# Patient Record
Sex: MALE | Race: WHITE | NOT HISPANIC OR LATINO | Employment: OTHER | URBAN - NONMETROPOLITAN AREA
[De-identification: names, ages, dates, MRNs, and addresses within clinical notes are randomized per-mention and may not be internally consistent; named-entity substitution may affect disease eponyms.]

---

## 2017-05-01 RX ORDER — NEBIVOLOL HYDROCHLORIDE 10 MG/1
TABLET ORAL
Qty: 90 TABLET | Refills: 3 | Status: SHIPPED | OUTPATIENT
Start: 2017-05-01

## 2017-06-21 ENCOUNTER — TELEPHONE (OUTPATIENT)
Dept: INTERNAL MEDICINE | Age: 67
End: 2017-06-21

## 2017-10-20 ENCOUNTER — HOSPITAL ENCOUNTER (EMERGENCY)
Age: 67
Discharge: HOME | End: 2017-10-21
Attending: EMERGENCY MEDICINE
Payer: MEDICARE

## 2017-10-20 DIAGNOSIS — I10 ESSENTIAL HYPERTENSION: ICD-10-CM

## 2017-10-20 DIAGNOSIS — K04.7 DENTAL ABSCESS: Primary | ICD-10-CM

## 2017-10-20 LAB
ALBUMIN SERPL-MCNC: 4.1 G/DL (ref 3.5–5.2)
ALP BLD-CCNC: 162 U/L (ref 40–130)
ALT SERPL-CCNC: 10 U/L (ref 5–41)
ANION GAP SERPL CALCULATED.3IONS-SCNC: 13 MMOL/L (ref 7–19)
AST SERPL-CCNC: 17 U/L (ref 5–40)
BASOPHILS ABSOLUTE: 0.1 K/UL (ref 0–0.2)
BASOPHILS RELATIVE PERCENT: 0.6 % (ref 0–1)
BILIRUB SERPL-MCNC: 0.4 MG/DL (ref 0.2–1.2)
BUN BLDV-MCNC: 19 MG/DL (ref 8–23)
CALCIUM SERPL-MCNC: 9.5 MG/DL (ref 8.8–10.2)
CHLORIDE BLD-SCNC: 99 MMOL/L (ref 98–111)
CO2: 25 MMOL/L (ref 22–29)
CREAT SERPL-MCNC: 0.8 MG/DL (ref 0.5–1.2)
EOSINOPHILS ABSOLUTE: 0.2 K/UL (ref 0–0.6)
EOSINOPHILS RELATIVE PERCENT: 1.9 % (ref 0–5)
GFR NON-AFRICAN AMERICAN: >60
GLUCOSE BLD-MCNC: 96 MG/DL (ref 74–109)
HCT VFR BLD CALC: 46.8 % (ref 42–52)
HEMOGLOBIN: 15.8 G/DL (ref 14–18)
LYMPHOCYTES ABSOLUTE: 1.8 K/UL (ref 1.1–4.5)
LYMPHOCYTES RELATIVE PERCENT: 20.8 % (ref 20–40)
MCH RBC QN AUTO: 27.3 PG (ref 27–31)
MCHC RBC AUTO-ENTMCNC: 33.8 G/DL (ref 33–37)
MCV RBC AUTO: 81 FL (ref 80–94)
MONOCYTES ABSOLUTE: 0.8 K/UL (ref 0–0.9)
MONOCYTES RELATIVE PERCENT: 9.8 % (ref 0–10)
NEUTROPHILS ABSOLUTE: 5.6 K/UL (ref 1.5–7.5)
NEUTROPHILS RELATIVE PERCENT: 66.5 % (ref 50–65)
PDW BLD-RTO: 13.3 % (ref 11.5–14.5)
PLATELET # BLD: 245 K/UL (ref 130–400)
PMV BLD AUTO: 9.5 FL (ref 9.4–12.4)
POTASSIUM SERPL-SCNC: 4.5 MMOL/L (ref 3.5–5)
RBC # BLD: 5.78 M/UL (ref 4.7–6.1)
SODIUM BLD-SCNC: 137 MMOL/L (ref 136–145)
TOTAL PROTEIN: 7.3 G/DL (ref 6.6–8.7)
WBC # BLD: 8.4 K/UL (ref 4.8–10.8)

## 2017-10-20 PROCEDURE — 96375 TX/PRO/DX INJ NEW DRUG ADDON: CPT

## 2017-10-20 PROCEDURE — 2500000003 HC RX 250 WO HCPCS: Performed by: EMERGENCY MEDICINE

## 2017-10-20 PROCEDURE — 99283 EMERGENCY DEPT VISIT LOW MDM: CPT

## 2017-10-20 RX ORDER — AMLODIPINE BESYLATE 10 MG/1
10 TABLET ORAL DAILY
Status: ON HOLD | COMMUNITY
End: 2019-11-17 | Stop reason: HOSPADM

## 2017-10-20 RX ORDER — LABETALOL HYDROCHLORIDE 5 MG/ML
INJECTION, SOLUTION INTRAVENOUS
Status: DISCONTINUED
Start: 2017-10-20 | End: 2017-10-21 | Stop reason: HOSPADM

## 2017-10-20 RX ORDER — LABETALOL HYDROCHLORIDE 5 MG/ML
10 INJECTION, SOLUTION INTRAVENOUS ONCE
Status: COMPLETED | OUTPATIENT
Start: 2017-10-20 | End: 2017-10-20

## 2017-10-20 RX ORDER — PENICILLIN V POTASSIUM 500 MG/1
500 TABLET ORAL 4 TIMES DAILY
Status: ON HOLD | COMMUNITY
End: 2019-11-17 | Stop reason: HOSPADM

## 2017-10-20 RX ORDER — ATORVASTATIN CALCIUM 80 MG/1
80 TABLET, FILM COATED ORAL DAILY
COMMUNITY

## 2017-10-20 RX ORDER — NEBIVOLOL 10 MG/1
TABLET ORAL DAILY
Status: ON HOLD | COMMUNITY
End: 2019-11-17 | Stop reason: HOSPADM

## 2017-10-20 RX ORDER — LOSARTAN POTASSIUM 50 MG/1
50 TABLET ORAL DAILY
Status: ON HOLD | COMMUNITY
End: 2019-11-17 | Stop reason: HOSPADM

## 2017-10-20 RX ORDER — HYDROCODONE BITARTRATE AND ACETAMINOPHEN 5; 325 MG/1; MG/1
1 TABLET ORAL EVERY 6 HOURS PRN
COMMUNITY

## 2017-10-20 RX ADMIN — LABETALOL HYDROCHLORIDE 10 MG: 5 INJECTION INTRAVENOUS at 23:17

## 2017-10-21 VITALS
RESPIRATION RATE: 20 BRPM | SYSTOLIC BLOOD PRESSURE: 136 MMHG | BODY MASS INDEX: 29.82 KG/M2 | OXYGEN SATURATION: 98 % | HEIGHT: 67 IN | TEMPERATURE: 97.9 F | WEIGHT: 190 LBS | HEART RATE: 72 BPM | DIASTOLIC BLOOD PRESSURE: 81 MMHG

## 2017-10-21 PROCEDURE — 6360000002 HC RX W HCPCS: Performed by: EMERGENCY MEDICINE

## 2017-10-21 PROCEDURE — 99282 EMERGENCY DEPT VISIT SF MDM: CPT | Performed by: EMERGENCY MEDICINE

## 2017-10-21 PROCEDURE — 85025 COMPLETE CBC W/AUTO DIFF WBC: CPT

## 2017-10-21 PROCEDURE — 80053 COMPREHEN METABOLIC PANEL: CPT

## 2017-10-21 PROCEDURE — 2500000003 HC RX 250 WO HCPCS: Performed by: EMERGENCY MEDICINE

## 2017-10-21 PROCEDURE — 96375 TX/PRO/DX INJ NEW DRUG ADDON: CPT

## 2017-10-21 PROCEDURE — 36415 COLL VENOUS BLD VENIPUNCTURE: CPT

## 2017-10-21 PROCEDURE — 6370000000 HC RX 637 (ALT 250 FOR IP): Performed by: EMERGENCY MEDICINE

## 2017-10-21 PROCEDURE — 96365 THER/PROPH/DIAG IV INF INIT: CPT

## 2017-10-21 RX ORDER — ONDANSETRON 2 MG/ML
4 INJECTION INTRAMUSCULAR; INTRAVENOUS EVERY 30 MIN PRN
Status: DISCONTINUED | OUTPATIENT
Start: 2017-10-21 | End: 2017-10-21 | Stop reason: HOSPADM

## 2017-10-21 RX ORDER — CLONIDINE HYDROCHLORIDE 0.1 MG/1
TABLET ORAL
Qty: 20 TABLET | Refills: 1 | Status: ON HOLD | OUTPATIENT
Start: 2017-10-21 | End: 2019-11-17 | Stop reason: HOSPADM

## 2017-10-21 RX ORDER — CLINDAMYCIN PHOSPHATE 600 MG/50ML
600 INJECTION INTRAVENOUS ONCE
Status: COMPLETED | OUTPATIENT
Start: 2017-10-21 | End: 2017-10-21

## 2017-10-21 RX ORDER — CLONIDINE HYDROCHLORIDE 0.1 MG/1
0.1 TABLET ORAL ONCE
Status: COMPLETED | OUTPATIENT
Start: 2017-10-21 | End: 2017-10-21

## 2017-10-21 RX ADMIN — CLONIDINE HYDROCHLORIDE 0.1 MG: 0.1 TABLET ORAL at 00:06

## 2017-10-21 RX ADMIN — CLINDAMYCIN PHOSPHATE 600 MG: 600 INJECTION INTRAVENOUS at 00:06

## 2017-10-21 RX ADMIN — HYDROMORPHONE HYDROCHLORIDE 0.5 MG: 1 INJECTION, SOLUTION INTRAMUSCULAR; INTRAVENOUS; SUBCUTANEOUS at 01:51

## 2017-10-21 RX ADMIN — ONDANSETRON 4 MG: 2 INJECTION INTRAMUSCULAR; INTRAVENOUS at 01:50

## 2017-10-21 ASSESSMENT — ENCOUNTER SYMPTOMS
SINUS PRESSURE: 0
CONSTIPATION: 0
NAUSEA: 0
APNEA: 0
DIARRHEA: 0
CHOKING: 0
SORE THROAT: 0
FACIAL SWELLING: 0
BLOOD IN STOOL: 0
VOICE CHANGE: 0
EYE DISCHARGE: 0

## 2017-10-21 ASSESSMENT — PAIN SCALES - GENERAL: PAINLEVEL_OUTOF10: 2

## 2017-10-21 NOTE — ED NOTES
ASSESSMENT: here with elevated bp and swollen right jaw with dental pain    PT ALERT/ORIENTED X4. PUPILS EQUAL/REACTIVE    SKIN:  WARM/DRY PINK CAPILLARY REFILL < 2SECS    CARDIAC:  S1 S2 NOTED     LUNGS: CLEAR UPPER AND LOWER LOBES, RESPIRATIONS EVEN/UNLABORED     ABDOMEN: BOWEL SOUNDS NOTED UPPER AND LOWER QUADRANTS                     SOFT AND NONTENDER. EXTREMITIES:  BILATERAL DP AND PT AND NO EDEMA NOTED. NO DISTRESS NOTED. SIDE RAILS UP AND CALL LIGHT IN REACH.      Vinny Noe RN  10/20/17 8267

## 2017-10-21 NOTE — ED NOTES
Patient placed in a gown Patient placed on cardiac monitor, continuous pulse oximeter, and NIBP monitor.  Monitor alarms on.       Linda Contreras RN  10/20/17 3209

## 2017-10-21 NOTE — ED PROVIDER NOTES
for level 4, 8 or more for level 5)     ED Triage Vitals [10/20/17 2258]   BP Temp Temp src Pulse Resp SpO2 Height Weight   (!) 208/103 97.9 °F (36.6 °C) -- 68 20 98 % 5' 7\" (1.702 m) 190 lb (86.2 kg)       Physical Exam   Constitutional: He is oriented to person, place, and time. He appears well-developed and well-nourished. No distress. HENT:   Head: Normocephalic and atraumatic. Right Ear: External ear normal.   Left Ear: External ear normal.   Nose: Nose normal.   Mouth/Throat: Oropharynx is clear and moist.   There is obvious facial swelling at the anterior aspect of the right mandible. There is is firmness of the tissue no palpable abscess that'll see any pointing. Deftly has some tooth decay at this site but I don't see any fistula tracks or discharge becomes mildly irritated looking. Eyes: Conjunctivae and EOM are normal. Pupils are equal, round, and reactive to light. Neck: Normal range of motion. Neck supple. Cardiovascular: Normal rate, regular rhythm, normal heart sounds and intact distal pulses. No murmur heard. Pulmonary/Chest: Effort normal and breath sounds normal.   Abdominal: Soft. Musculoskeletal: Normal range of motion. Lymphadenopathy:     He has no cervical adenopathy. Neurological: He is alert and oriented to person, place, and time. No cranial nerve deficit. Skin: Skin is warm and dry. Psychiatric: He has a normal mood and affect. His behavior is normal. Thought content normal.   Nursing note and vitals reviewed.       DIAGNOSTIC RESULTS     EKG: All EKG's are interpreted by the Emergency Department Physician who either signs or Co-signs this chart in the absence of a cardiologist.        RADIOLOGY:   Non-plain film images such as CT, Ultrasound and MRI are read by the radiologist. Plain radiographic images are visualized and preliminarily interpreted by the emergency physician with the below findings:        Interpretation per the Radiologist below, if available at the time of this note:    No orders to display         ED BEDSIDE ULTRASOUND:   Performed by ED Physician - none    LABS:  Labs Reviewed   CBC WITH AUTO DIFFERENTIAL - Abnormal; Notable for the following:        Result Value    Neutrophils % 66.5 (*)     All other components within normal limits   COMPREHENSIVE METABOLIC PANEL - Abnormal; Notable for the following:     Alkaline Phosphatase 162 (*)     All other components within normal limits       All other labs were within normal range or not returned as of this dictation. EMERGENCY DEPARTMENT COURSE and DIFFERENTIAL DIAGNOSIS/MDM:   Vitals:    Vitals:    10/21/17 0027 10/21/17 0033 10/21/17 0106 10/21/17 0142   BP: (!) 169/98 (!) 165/88 (!) 144/86 136/81   Pulse: 70 72 72 72   Resp: 20  20 20   Temp:       SpO2: 97%  98% 98%   Weight:       Height:           MDM  Number of Diagnoses or Management Options  Dental abscess:   Essential hypertension:   Diagnosis management comments: Patient is going to promised takes no medications routinely. Keep up with his blood pressure. Given him some Catapres for when necessary hypertensive treatment when necessary until he decides whether he needs medication increased or an addition of a new medicine. It may be up to speak because he's got this infection and pain. I take to make an adjustment that he doesn't need long term but he sounds like he's got good family support. CONSULTS:  None    PROCEDURES:  Unless otherwise noted below, none     Procedures    FINAL IMPRESSION      1. Dental abscess    2. Essential hypertension          DISPOSITION/PLAN   DISPOSITION Decision to Discharge    PATIENT REFERRED TO:  [address]   [facility] [address] [phone]  Schedule an appointment as soon as possible for a visit         DISCHARGE MEDICATIONS:  New Prescriptions    CLONIDINE (CATAPRES) 0.1 MG TABLET    Take 1 tablet by mouth every 8 hours when necessary for systolic blood pressure greater

## 2019-02-20 ENCOUNTER — TELEPHONE (OUTPATIENT)
Dept: OTHER | Age: 69
End: 2019-02-20

## 2019-11-14 ENCOUNTER — HOSPITAL ENCOUNTER (INPATIENT)
Age: 69
LOS: 3 days | Discharge: HOME | DRG: 305 | End: 2019-11-17
Attending: HOSPITALIST | Admitting: HOSPITALIST
Payer: MEDICARE

## 2019-11-14 PROBLEM — I16.0 HYPERTENSIVE URGENCY: Status: ACTIVE | Noted: 2019-11-14

## 2019-11-14 PROCEDURE — 2100000000 HC CCU R&B

## 2019-11-14 PROCEDURE — 2500000003 HC RX 250 WO HCPCS: Performed by: HOSPITALIST

## 2019-11-14 PROCEDURE — 2580000003 HC RX 258: Performed by: HOSPITALIST

## 2019-11-14 RX ORDER — SODIUM CHLORIDE 0.9 % (FLUSH) 0.9 %
10 SYRINGE (ML) INJECTION PRN
Status: DISCONTINUED | OUTPATIENT
Start: 2019-11-14 | End: 2019-11-17 | Stop reason: HOSPADM

## 2019-11-14 RX ORDER — ENALAPRILAT 2.5 MG/2ML
1.25 INJECTION INTRAVENOUS EVERY 6 HOURS
Status: CANCELLED | OUTPATIENT
Start: 2019-11-14

## 2019-11-14 RX ORDER — HEPARIN SODIUM 5000 [USP'U]/ML
5000 INJECTION, SOLUTION INTRAVENOUS; SUBCUTANEOUS EVERY 8 HOURS SCHEDULED
Status: DISCONTINUED | OUTPATIENT
Start: 2019-11-14 | End: 2019-11-17 | Stop reason: HOSPADM

## 2019-11-14 RX ORDER — ONDANSETRON 2 MG/ML
4 INJECTION INTRAMUSCULAR; INTRAVENOUS EVERY 6 HOURS PRN
Status: DISCONTINUED | OUTPATIENT
Start: 2019-11-14 | End: 2019-11-17 | Stop reason: HOSPADM

## 2019-11-14 RX ORDER — SODIUM CHLORIDE 0.9 % (FLUSH) 0.9 %
10 SYRINGE (ML) INJECTION EVERY 12 HOURS SCHEDULED
Status: DISCONTINUED | OUTPATIENT
Start: 2019-11-14 | End: 2019-11-17 | Stop reason: HOSPADM

## 2019-11-14 RX ADMIN — DEXTROSE MONOHYDRATE 2.5 MG/HR: 5 INJECTION, SOLUTION INTRAVENOUS at 23:35

## 2019-11-14 ASSESSMENT — PAIN SCALES - GENERAL: PAINLEVEL_OUTOF10: 0

## 2019-11-15 LAB
ANION GAP SERPL CALCULATED.3IONS-SCNC: 13 MMOL/L (ref 7–19)
APTT: 28.1 SEC (ref 26–36.2)
BASOPHILS ABSOLUTE: 0 K/UL (ref 0–0.2)
BASOPHILS RELATIVE PERCENT: 0.3 % (ref 0–1)
BUN BLDV-MCNC: 20 MG/DL (ref 8–23)
CALCIUM SERPL-MCNC: 8.9 MG/DL (ref 8.8–10.2)
CHLORIDE BLD-SCNC: 102 MMOL/L (ref 98–111)
CHOLESTEROL, TOTAL: 168 MG/DL (ref 160–199)
CO2: 25 MMOL/L (ref 22–29)
CREAT SERPL-MCNC: 0.9 MG/DL (ref 0.5–1.2)
EOSINOPHILS ABSOLUTE: 0 K/UL (ref 0–0.6)
EOSINOPHILS RELATIVE PERCENT: 0 % (ref 0–5)
GFR NON-AFRICAN AMERICAN: >60
GLUCOSE BLD-MCNC: 157 MG/DL (ref 74–109)
HBA1C MFR BLD: 5.9 % (ref 4–6)
HCT VFR BLD CALC: 49.7 % (ref 42–52)
HDLC SERPL-MCNC: 28 MG/DL (ref 55–121)
HEMOGLOBIN: 16.6 G/DL (ref 14–18)
IMMATURE GRANULOCYTES #: 0 K/UL
INR BLD: 1.03 (ref 0.88–1.18)
LDL CHOLESTEROL CALCULATED: 86 MG/DL
LV EF: 58 %
LVEF MODALITY: NORMAL
LYMPHOCYTES ABSOLUTE: 0.9 K/UL (ref 1.1–4.5)
LYMPHOCYTES RELATIVE PERCENT: 14 % (ref 20–40)
MAGNESIUM: 2 MG/DL (ref 1.6–2.4)
MCH RBC QN AUTO: 27.1 PG (ref 27–31)
MCHC RBC AUTO-ENTMCNC: 33.4 G/DL (ref 33–37)
MCV RBC AUTO: 81.2 FL (ref 80–94)
MONOCYTES ABSOLUTE: 0.2 K/UL (ref 0–0.9)
MONOCYTES RELATIVE PERCENT: 3.4 % (ref 0–10)
NEUTROPHILS ABSOLUTE: 5.5 K/UL (ref 1.5–7.5)
NEUTROPHILS RELATIVE PERCENT: 81.9 % (ref 50–65)
PDW BLD-RTO: 13.3 % (ref 11.5–14.5)
PHOSPHORUS: 2.1 MG/DL (ref 2.5–4.5)
PHOSPHORUS: 2.7 MG/DL (ref 2.5–4.5)
PLATELET # BLD: 232 K/UL (ref 130–400)
PMV BLD AUTO: 10.1 FL (ref 9.4–12.4)
POTASSIUM REFLEX MAGNESIUM: 4.1 MMOL/L (ref 3.5–5)
PROTHROMBIN TIME: 12.9 SEC (ref 12–14.6)
RBC # BLD: 6.12 M/UL (ref 4.7–6.1)
SODIUM BLD-SCNC: 140 MMOL/L (ref 136–145)
TRIGL SERPL-MCNC: 268 MG/DL (ref 0–149)
TSH REFLEX FT4: 0.42 UIU/ML (ref 0.35–5.5)
WBC # BLD: 6.7 K/UL (ref 4.8–10.8)

## 2019-11-15 PROCEDURE — 2500000003 HC RX 250 WO HCPCS: Performed by: HOSPITALIST

## 2019-11-15 PROCEDURE — 6370000000 HC RX 637 (ALT 250 FOR IP): Performed by: INTERNAL MEDICINE

## 2019-11-15 PROCEDURE — 36415 COLL VENOUS BLD VENIPUNCTURE: CPT

## 2019-11-15 PROCEDURE — 83036 HEMOGLOBIN GLYCOSYLATED A1C: CPT

## 2019-11-15 PROCEDURE — 85730 THROMBOPLASTIN TIME PARTIAL: CPT

## 2019-11-15 PROCEDURE — 85610 PROTHROMBIN TIME: CPT

## 2019-11-15 PROCEDURE — 93306 TTE W/DOPPLER COMPLETE: CPT

## 2019-11-15 PROCEDURE — 92610 EVALUATE SWALLOWING FUNCTION: CPT

## 2019-11-15 PROCEDURE — 6360000002 HC RX W HCPCS: Performed by: HOSPITALIST

## 2019-11-15 PROCEDURE — 2100000000 HC CCU R&B

## 2019-11-15 PROCEDURE — 84443 ASSAY THYROID STIM HORMONE: CPT

## 2019-11-15 PROCEDURE — 83735 ASSAY OF MAGNESIUM: CPT

## 2019-11-15 PROCEDURE — 80048 BASIC METABOLIC PNL TOTAL CA: CPT

## 2019-11-15 PROCEDURE — 80061 LIPID PANEL: CPT

## 2019-11-15 PROCEDURE — 85025 COMPLETE CBC W/AUTO DIFF WBC: CPT

## 2019-11-15 PROCEDURE — 84100 ASSAY OF PHOSPHORUS: CPT

## 2019-11-15 PROCEDURE — 2580000003 HC RX 258: Performed by: HOSPITALIST

## 2019-11-15 RX ORDER — LOSARTAN POTASSIUM 50 MG/1
50 TABLET ORAL 2 TIMES DAILY
Status: DISCONTINUED | OUTPATIENT
Start: 2019-11-15 | End: 2019-11-17 | Stop reason: HOSPADM

## 2019-11-15 RX ORDER — NIFEDIPINE 30 MG/1
30 TABLET, EXTENDED RELEASE ORAL 2 TIMES DAILY
Status: DISCONTINUED | OUTPATIENT
Start: 2019-11-15 | End: 2019-11-17 | Stop reason: HOSPADM

## 2019-11-15 RX ORDER — GUAIFENESIN 600 MG/1
600 TABLET, EXTENDED RELEASE ORAL 2 TIMES DAILY
Status: DISCONTINUED | OUTPATIENT
Start: 2019-11-15 | End: 2019-11-15

## 2019-11-15 RX ORDER — GUAIFENESIN 600 MG/1
600 TABLET, EXTENDED RELEASE ORAL 2 TIMES DAILY
Status: DISCONTINUED | OUTPATIENT
Start: 2019-11-15 | End: 2019-11-17 | Stop reason: HOSPADM

## 2019-11-15 RX ADMIN — HEPARIN SODIUM 5000 UNITS: 5000 INJECTION INTRAVENOUS; SUBCUTANEOUS at 20:30

## 2019-11-15 RX ADMIN — DEXTROSE MONOHYDRATE 2.5 MG/HR: 5 INJECTION, SOLUTION INTRAVENOUS at 18:00

## 2019-11-15 RX ADMIN — LOSARTAN POTASSIUM 50 MG: 50 TABLET, FILM COATED ORAL at 20:30

## 2019-11-15 RX ADMIN — Medication 10 ML: at 01:03

## 2019-11-15 RX ADMIN — HEPARIN SODIUM 5000 UNITS: 5000 INJECTION INTRAVENOUS; SUBCUTANEOUS at 01:03

## 2019-11-15 RX ADMIN — HEPARIN SODIUM 5000 UNITS: 5000 INJECTION INTRAVENOUS; SUBCUTANEOUS at 14:53

## 2019-11-15 RX ADMIN — GUAIFENESIN 600 MG: 600 TABLET, EXTENDED RELEASE ORAL at 16:33

## 2019-11-15 RX ADMIN — POTASSIUM PHOSPHATE, MONOBASIC AND POTASSIUM PHOSPHATE, DIBASIC 10 MMOL: 224; 236 INJECTION, SOLUTION INTRAVENOUS at 06:29

## 2019-11-15 RX ADMIN — NIFEDIPINE 30 MG: 30 TABLET, FILM COATED, EXTENDED RELEASE ORAL at 11:22

## 2019-11-15 RX ADMIN — DEXTROSE MONOHYDRATE 2.5 MG/HR: 5 INJECTION, SOLUTION INTRAVENOUS at 06:35

## 2019-11-15 RX ADMIN — HEPARIN SODIUM 5000 UNITS: 5000 INJECTION INTRAVENOUS; SUBCUTANEOUS at 06:29

## 2019-11-15 RX ADMIN — NIFEDIPINE 30 MG: 30 TABLET, FILM COATED, EXTENDED RELEASE ORAL at 20:30

## 2019-11-15 RX ADMIN — LOSARTAN POTASSIUM 50 MG: 50 TABLET, FILM COATED ORAL at 11:22

## 2019-11-15 ASSESSMENT — PAIN SCALES - GENERAL
PAINLEVEL_OUTOF10: 0

## 2019-11-15 ASSESSMENT — ENCOUNTER SYMPTOMS
VOMITING: 0
NAUSEA: 0
SHORTNESS OF BREATH: 0
DIARRHEA: 0
CONSTIPATION: 0
BACK PAIN: 0

## 2019-11-16 ENCOUNTER — APPOINTMENT (OUTPATIENT)
Dept: GENERAL RADIOLOGY | Age: 69
DRG: 305 | End: 2019-11-16
Attending: HOSPITALIST
Payer: MEDICARE

## 2019-11-16 LAB
BACTERIA: NEGATIVE /HPF
BILIRUBIN URINE: NEGATIVE
BLOOD, URINE: NEGATIVE
CLARITY: CLEAR
COLOR: YELLOW
CREATININE URINE: 258.5 MG/DL (ref 4.2–622)
EPITHELIAL CELLS, UA: 1 /HPF (ref 0–5)
GLUCOSE URINE: NEGATIVE MG/DL
HYALINE CASTS: 2 /HPF (ref 0–8)
KETONES, URINE: NEGATIVE MG/DL
LEUKOCYTE ESTERASE, URINE: ABNORMAL
NITRITE, URINE: NEGATIVE
PH UA: 5.5 (ref 5–8)
PROTEIN PROTEIN: 19 MG/DL (ref 15–45)
PROTEIN UA: NEGATIVE MG/DL
RAPID INFLUENZA  B AGN: NEGATIVE
RAPID INFLUENZA A AGN: NEGATIVE
RBC UA: 1 /HPF (ref 0–4)
SODIUM URINE: 67 MMOL/L
SPECIFIC GRAVITY UA: 1.02 (ref 1–1.03)
UROBILINOGEN, URINE: 0.2 E.U./DL
WBC UA: 5 /HPF (ref 0–5)

## 2019-11-16 PROCEDURE — 6370000000 HC RX 637 (ALT 250 FOR IP): Performed by: HOSPITALIST

## 2019-11-16 PROCEDURE — 2500000003 HC RX 250 WO HCPCS: Performed by: HOSPITALIST

## 2019-11-16 PROCEDURE — 94640 AIRWAY INHALATION TREATMENT: CPT

## 2019-11-16 PROCEDURE — 84156 ASSAY OF PROTEIN URINE: CPT

## 2019-11-16 PROCEDURE — 87070 CULTURE OTHR SPECIMN AEROBIC: CPT

## 2019-11-16 PROCEDURE — 2580000003 HC RX 258: Performed by: HOSPITALIST

## 2019-11-16 PROCEDURE — 94761 N-INVAS EAR/PLS OXIMETRY MLT: CPT

## 2019-11-16 PROCEDURE — 2580000003 HC RX 258: Performed by: INTERNAL MEDICINE

## 2019-11-16 PROCEDURE — 2700000000 HC OXYGEN THERAPY PER DAY

## 2019-11-16 PROCEDURE — 6360000002 HC RX W HCPCS: Performed by: INTERNAL MEDICINE

## 2019-11-16 PROCEDURE — 81001 URINALYSIS AUTO W/SCOPE: CPT

## 2019-11-16 PROCEDURE — 87205 SMEAR GRAM STAIN: CPT

## 2019-11-16 PROCEDURE — 6370000000 HC RX 637 (ALT 250 FOR IP): Performed by: INTERNAL MEDICINE

## 2019-11-16 PROCEDURE — 82570 ASSAY OF URINE CREATININE: CPT

## 2019-11-16 PROCEDURE — 71046 X-RAY EXAM CHEST 2 VIEWS: CPT

## 2019-11-16 PROCEDURE — 2140000000 HC CCU INTERMEDIATE R&B

## 2019-11-16 PROCEDURE — 84300 ASSAY OF URINE SODIUM: CPT

## 2019-11-16 PROCEDURE — 92526 ORAL FUNCTION THERAPY: CPT

## 2019-11-16 PROCEDURE — 6360000002 HC RX W HCPCS: Performed by: HOSPITALIST

## 2019-11-16 PROCEDURE — 87804 INFLUENZA ASSAY W/OPTIC: CPT

## 2019-11-16 RX ORDER — CARVEDILOL 6.25 MG/1
6.25 TABLET ORAL 2 TIMES DAILY WITH MEALS
Status: DISCONTINUED | OUTPATIENT
Start: 2019-11-16 | End: 2019-11-17

## 2019-11-16 RX ORDER — ACETYLCYSTEINE 200 MG/ML
600 SOLUTION ORAL; RESPIRATORY (INHALATION) 2 TIMES DAILY
Status: DISCONTINUED | OUTPATIENT
Start: 2019-11-16 | End: 2019-11-17 | Stop reason: HOSPADM

## 2019-11-16 RX ADMIN — Medication 1 G: at 18:18

## 2019-11-16 RX ADMIN — ACETYLCYSTEINE 600 MG: 200 SOLUTION ORAL; RESPIRATORY (INHALATION) at 19:09

## 2019-11-16 RX ADMIN — GUAIFENESIN 600 MG: 600 TABLET, EXTENDED RELEASE ORAL at 09:37

## 2019-11-16 RX ADMIN — CARVEDILOL 6.25 MG: 6.25 TABLET, FILM COATED ORAL at 11:24

## 2019-11-16 RX ADMIN — ACETYLCYSTEINE 600 MG: 200 SOLUTION ORAL; RESPIRATORY (INHALATION) at 11:12

## 2019-11-16 RX ADMIN — NIFEDIPINE 30 MG: 30 TABLET, FILM COATED, EXTENDED RELEASE ORAL at 21:05

## 2019-11-16 RX ADMIN — BENZOCAINE 6 MG-MENTHOL 10 MG LOZENGES 1 LOZENGE: at 03:09

## 2019-11-16 RX ADMIN — IPRATROPIUM BROMIDE 0.5 MG: 0.5 SOLUTION RESPIRATORY (INHALATION) at 14:31

## 2019-11-16 RX ADMIN — IPRATROPIUM BROMIDE 0.5 MG: 0.5 SOLUTION RESPIRATORY (INHALATION) at 19:09

## 2019-11-16 RX ADMIN — HEPARIN SODIUM 5000 UNITS: 5000 INJECTION INTRAVENOUS; SUBCUTANEOUS at 15:21

## 2019-11-16 RX ADMIN — IPRATROPIUM BROMIDE 0.5 MG: 0.5 SOLUTION RESPIRATORY (INHALATION) at 11:12

## 2019-11-16 RX ADMIN — CARVEDILOL 6.25 MG: 6.25 TABLET, FILM COATED ORAL at 17:43

## 2019-11-16 RX ADMIN — LOSARTAN POTASSIUM 50 MG: 50 TABLET, FILM COATED ORAL at 21:05

## 2019-11-16 RX ADMIN — NIFEDIPINE 30 MG: 30 TABLET, FILM COATED, EXTENDED RELEASE ORAL at 09:38

## 2019-11-16 RX ADMIN — HEPARIN SODIUM 5000 UNITS: 5000 INJECTION INTRAVENOUS; SUBCUTANEOUS at 21:05

## 2019-11-16 RX ADMIN — LOSARTAN POTASSIUM 50 MG: 50 TABLET, FILM COATED ORAL at 09:38

## 2019-11-16 RX ADMIN — Medication 10 ML: at 09:50

## 2019-11-16 RX ADMIN — HEPARIN SODIUM 5000 UNITS: 5000 INJECTION INTRAVENOUS; SUBCUTANEOUS at 06:04

## 2019-11-16 RX ADMIN — BENZOCAINE 6 MG-MENTHOL 10 MG LOZENGES 1 LOZENGE: at 06:04

## 2019-11-16 RX ADMIN — AZITHROMYCIN 500 MG: 500 INJECTION, POWDER, LYOPHILIZED, FOR SOLUTION INTRAVENOUS at 18:24

## 2019-11-16 RX ADMIN — DEXTROSE MONOHYDRATE 2.5 MG/HR: 5 INJECTION, SOLUTION INTRAVENOUS at 06:10

## 2019-11-16 ASSESSMENT — ENCOUNTER SYMPTOMS
VOMITING: 0
CONSTIPATION: 0
DIARRHEA: 0
NAUSEA: 0
SHORTNESS OF BREATH: 1
BACK PAIN: 0
COUGH: 1

## 2019-11-16 ASSESSMENT — PAIN SCALES - GENERAL
PAINLEVEL_OUTOF10: 0

## 2019-11-17 ENCOUNTER — HOSPITAL ENCOUNTER (EMERGENCY)
Facility: HOSPITAL | Age: 69
Discharge: HOME | End: 2019-11-18
Attending: INTERNAL MEDICINE | Admitting: INTERNAL MEDICINE

## 2019-11-17 ENCOUNTER — APPOINTMENT (OUTPATIENT)
Dept: GENERAL RADIOLOGY | Facility: HOSPITAL | Age: 69
End: 2019-11-17

## 2019-11-17 VITALS
WEIGHT: 190 LBS | OXYGEN SATURATION: 93 % | RESPIRATION RATE: 18 BRPM | HEIGHT: 67 IN | TEMPERATURE: 98 F | DIASTOLIC BLOOD PRESSURE: 80 MMHG | BODY MASS INDEX: 29.82 KG/M2 | HEART RATE: 89 BPM | SYSTOLIC BLOOD PRESSURE: 158 MMHG

## 2019-11-17 DIAGNOSIS — J20.9 ACUTE BRONCHITIS, UNSPECIFIED ORGANISM: ICD-10-CM

## 2019-11-17 DIAGNOSIS — I16.0 HYPERTENSIVE URGENCY: Primary | ICD-10-CM

## 2019-11-17 LAB
ALBUMIN SERPL-MCNC: 4.4 G/DL (ref 3.5–5.2)
ALBUMIN/GLOB SERPL: 1.5 G/DL
ALP SERPL-CCNC: 147 U/L (ref 39–117)
ALT SERPL W P-5'-P-CCNC: 20 U/L (ref 1–41)
ANION GAP SERPL CALCULATED.3IONS-SCNC: 15 MMOL/L (ref 5–15)
ANION GAP SERPL CALCULATED.3IONS-SCNC: 16 MMOL/L (ref 7–19)
AST SERPL-CCNC: 15 U/L (ref 1–40)
BASOPHILS # BLD AUTO: 0.05 10*3/MM3 (ref 0–0.2)
BASOPHILS NFR BLD AUTO: 0.6 % (ref 0–1.5)
BILIRUB SERPL-MCNC: 0.6 MG/DL (ref 0.2–1.2)
BUN BLD-MCNC: 19 MG/DL (ref 8–23)
BUN BLDV-MCNC: 22 MG/DL (ref 8–23)
BUN/CREAT SERPL: 20.4 (ref 7–25)
CALCIUM SERPL-MCNC: 8.6 MG/DL (ref 8.8–10.2)
CALCIUM SPEC-SCNC: 9.2 MG/DL (ref 8.6–10.5)
CHLORIDE BLD-SCNC: 101 MMOL/L (ref 98–111)
CHLORIDE SERPL-SCNC: 100 MMOL/L (ref 98–107)
CO2 SERPL-SCNC: 26 MMOL/L (ref 22–29)
CO2: 24 MMOL/L (ref 22–29)
CREAT BLD-MCNC: 0.93 MG/DL (ref 0.76–1.27)
CREAT SERPL-MCNC: 1 MG/DL (ref 0.5–1.2)
D-LACTATE SERPL-SCNC: 0.9 MMOL/L (ref 0.5–2)
DEPRECATED RDW RBC AUTO: 37.7 FL (ref 37–54)
EOSINOPHIL # BLD AUTO: 0.24 10*3/MM3 (ref 0–0.4)
EOSINOPHIL NFR BLD AUTO: 2.7 % (ref 0.3–6.2)
ERYTHROCYTE [DISTWIDTH] IN BLOOD BY AUTOMATED COUNT: 13.3 % (ref 12.3–15.4)
GFR NON-AFRICAN AMERICAN: >60
GFR SERPL CREATININE-BSD FRML MDRD: 80 ML/MIN/1.73
GLOBULIN UR ELPH-MCNC: 2.9 GM/DL
GLUCOSE BLD-MCNC: 116 MG/DL (ref 65–99)
GLUCOSE BLD-MCNC: 88 MG/DL (ref 74–109)
HCT VFR BLD AUTO: 47.4 % (ref 37.5–51)
HCT VFR BLD CALC: 44.5 % (ref 42–52)
HEMOGLOBIN: 14.3 G/DL (ref 14–18)
HGB BLD-MCNC: 16.3 G/DL (ref 13–17.7)
IMM GRANULOCYTES # BLD AUTO: 0.04 10*3/MM3 (ref 0–0.05)
IMM GRANULOCYTES NFR BLD AUTO: 0.5 % (ref 0–0.5)
LYMPHOCYTES # BLD AUTO: 1.16 10*3/MM3 (ref 0.7–3.1)
LYMPHOCYTES NFR BLD AUTO: 13.1 % (ref 19.6–45.3)
MCH RBC QN AUTO: 26.6 PG (ref 27–31)
MCH RBC QN AUTO: 27.1 PG (ref 26.6–33)
MCHC RBC AUTO-ENTMCNC: 32.1 G/DL (ref 33–37)
MCHC RBC AUTO-ENTMCNC: 34.4 G/DL (ref 31.5–35.7)
MCV RBC AUTO: 78.7 FL (ref 79–97)
MCV RBC AUTO: 82.7 FL (ref 80–94)
MONOCYTES # BLD AUTO: 0.66 10*3/MM3 (ref 0.1–0.9)
MONOCYTES NFR BLD AUTO: 7.4 % (ref 5–12)
NEUTROPHILS # BLD AUTO: 6.72 10*3/MM3 (ref 1.7–7)
NEUTROPHILS NFR BLD AUTO: 75.7 % (ref 42.7–76)
NRBC BLD AUTO-RTO: 0 /100 WBC (ref 0–0.2)
NT-PROBNP SERPL-MCNC: 206.9 PG/ML (ref 5–900)
PDW BLD-RTO: 13.5 % (ref 11.5–14.5)
PLATELET # BLD AUTO: 248 10*3/MM3 (ref 140–450)
PLATELET # BLD: 217 K/UL (ref 130–400)
PMV BLD AUTO: 10.1 FL (ref 6–12)
PMV BLD AUTO: 10.2 FL (ref 9.4–12.4)
POTASSIUM BLD-SCNC: 4.2 MMOL/L (ref 3.5–5.2)
POTASSIUM SERPL-SCNC: 4.2 MMOL/L (ref 3.5–5)
PROT SERPL-MCNC: 7.3 G/DL (ref 6–8.5)
RBC # BLD AUTO: 6.02 10*6/MM3 (ref 4.14–5.8)
RBC # BLD: 5.38 M/UL (ref 4.7–6.1)
SODIUM BLD-SCNC: 141 MMOL/L (ref 136–145)
SODIUM BLD-SCNC: 141 MMOL/L (ref 136–145)
WBC # BLD: 8.3 K/UL (ref 4.8–10.8)
WBC NRBC COR # BLD: 8.87 10*3/MM3 (ref 3.4–10.8)

## 2019-11-17 PROCEDURE — 80048 BASIC METABOLIC PNL TOTAL CA: CPT

## 2019-11-17 PROCEDURE — 6370000000 HC RX 637 (ALT 250 FOR IP): Performed by: INTERNAL MEDICINE

## 2019-11-17 PROCEDURE — 96365 THER/PROPH/DIAG IV INF INIT: CPT

## 2019-11-17 PROCEDURE — 6360000002 HC RX W HCPCS: Performed by: INTERNAL MEDICINE

## 2019-11-17 PROCEDURE — 2580000003 HC RX 258: Performed by: INTERNAL MEDICINE

## 2019-11-17 PROCEDURE — 83605 ASSAY OF LACTIC ACID: CPT | Performed by: INTERNAL MEDICINE

## 2019-11-17 PROCEDURE — 94640 AIRWAY INHALATION TREATMENT: CPT

## 2019-11-17 PROCEDURE — 36415 COLL VENOUS BLD VENIPUNCTURE: CPT

## 2019-11-17 PROCEDURE — 25010000002 CEFTRIAXONE PER 250 MG: Performed by: INTERNAL MEDICINE

## 2019-11-17 PROCEDURE — 85027 COMPLETE CBC AUTOMATED: CPT

## 2019-11-17 PROCEDURE — 83880 ASSAY OF NATRIURETIC PEPTIDE: CPT | Performed by: INTERNAL MEDICINE

## 2019-11-17 PROCEDURE — 99284 EMERGENCY DEPT VISIT MOD MDM: CPT

## 2019-11-17 PROCEDURE — 87040 BLOOD CULTURE FOR BACTERIA: CPT | Performed by: INTERNAL MEDICINE

## 2019-11-17 PROCEDURE — 71045 X-RAY EXAM CHEST 1 VIEW: CPT

## 2019-11-17 PROCEDURE — 85025 COMPLETE CBC W/AUTO DIFF WBC: CPT | Performed by: INTERNAL MEDICINE

## 2019-11-17 PROCEDURE — 80053 COMPREHEN METABOLIC PANEL: CPT | Performed by: INTERNAL MEDICINE

## 2019-11-17 RX ORDER — CARVEDILOL 12.5 MG/1
12.5 TABLET ORAL 2 TIMES DAILY WITH MEALS
Qty: 60 TABLET | Refills: 3 | Status: SHIPPED | OUTPATIENT
Start: 2019-11-17

## 2019-11-17 RX ORDER — LOSARTAN POTASSIUM 50 MG/1
50 TABLET ORAL 2 TIMES DAILY
Qty: 30 TABLET | Refills: 3 | Status: SHIPPED | OUTPATIENT
Start: 2019-11-17

## 2019-11-17 RX ORDER — ATORVASTATIN CALCIUM 80 MG/1
80 TABLET, FILM COATED ORAL DAILY
COMMUNITY

## 2019-11-17 RX ORDER — CLONIDINE HYDROCHLORIDE 0.1 MG/1
0.1 TABLET ORAL ONCE
Status: COMPLETED | OUTPATIENT
Start: 2019-11-17 | End: 2019-11-17

## 2019-11-17 RX ORDER — AZITHROMYCIN 250 MG/1
250 TABLET, FILM COATED ORAL SEE ADMIN INSTRUCTIONS
Qty: 6 TABLET | Refills: 0 | Status: SHIPPED | OUTPATIENT
Start: 2019-11-17 | End: 2019-11-22

## 2019-11-17 RX ORDER — CARVEDILOL 12.5 MG/1
12.5 TABLET ORAL 2 TIMES DAILY WITH MEALS
Status: DISCONTINUED | OUTPATIENT
Start: 2019-11-17 | End: 2019-11-17 | Stop reason: HOSPADM

## 2019-11-17 RX ORDER — CARVEDILOL 12.5 MG/1
12.5 TABLET ORAL 2 TIMES DAILY WITH MEALS
COMMUNITY
End: 2020-12-11 | Stop reason: DRUGHIGH

## 2019-11-17 RX ORDER — LOSARTAN POTASSIUM 100 MG/1
100 TABLET ORAL 2 TIMES DAILY
COMMUNITY
End: 2020-12-11 | Stop reason: DRUGHIGH

## 2019-11-17 RX ORDER — HYDROCODONE BITARTRATE AND ACETAMINOPHEN 5; 325 MG/1; MG/1
1 TABLET ORAL EVERY 6 HOURS PRN
COMMUNITY
End: 2020-05-26

## 2019-11-17 RX ORDER — SODIUM CHLORIDE 0.9 % (FLUSH) 0.9 %
10 SYRINGE (ML) INJECTION AS NEEDED
Status: DISCONTINUED | OUTPATIENT
Start: 2019-11-17 | End: 2019-11-18 | Stop reason: HOSPADM

## 2019-11-17 RX ORDER — GUAIFENESIN 600 MG/1
600 TABLET, EXTENDED RELEASE ORAL 2 TIMES DAILY
Qty: 40 TABLET | Refills: 0 | Status: SHIPPED | OUTPATIENT
Start: 2019-11-17

## 2019-11-17 RX ORDER — GUAIFENESIN 600 MG/1
1200 TABLET, EXTENDED RELEASE ORAL 2 TIMES DAILY
Status: ON HOLD | COMMUNITY
End: 2020-01-22

## 2019-11-17 RX ORDER — NIFEDIPINE 30 MG/1
30 TABLET, FILM COATED, EXTENDED RELEASE ORAL 2 TIMES DAILY
Qty: 30 TABLET | Refills: 3 | Status: SHIPPED | OUTPATIENT
Start: 2019-11-17

## 2019-11-17 RX ORDER — AZITHROMYCIN 250 MG/1
250 TABLET, FILM COATED ORAL DAILY
Status: ON HOLD | COMMUNITY
End: 2020-01-22

## 2019-11-17 RX ORDER — NIFEDIPINE 30 MG/1
30 TABLET, EXTENDED RELEASE ORAL 2 TIMES DAILY
Status: ON HOLD | COMMUNITY
End: 2020-01-22

## 2019-11-17 RX ADMIN — GUAIFENESIN 600 MG: 600 TABLET, EXTENDED RELEASE ORAL at 08:18

## 2019-11-17 RX ADMIN — NIFEDIPINE 30 MG: 30 TABLET, FILM COATED, EXTENDED RELEASE ORAL at 08:18

## 2019-11-17 RX ADMIN — CEFTRIAXONE SODIUM 1 G: 1 INJECTION, POWDER, FOR SOLUTION INTRAMUSCULAR; INTRAVENOUS at 23:26

## 2019-11-17 RX ADMIN — IPRATROPIUM BROMIDE 0.5 MG: 0.5 SOLUTION RESPIRATORY (INHALATION) at 06:59

## 2019-11-17 RX ADMIN — LOSARTAN POTASSIUM 50 MG: 50 TABLET, FILM COATED ORAL at 08:18

## 2019-11-17 RX ADMIN — AZITHROMYCIN 500 MG: 500 INJECTION, POWDER, LYOPHILIZED, FOR SOLUTION INTRAVENOUS at 12:13

## 2019-11-17 RX ADMIN — IPRATROPIUM BROMIDE 0.5 MG: 0.5 SOLUTION RESPIRATORY (INHALATION) at 10:34

## 2019-11-17 RX ADMIN — HEPARIN SODIUM 5000 UNITS: 5000 INJECTION INTRAVENOUS; SUBCUTANEOUS at 05:55

## 2019-11-17 RX ADMIN — CLONIDINE HYDROCHLORIDE 0.1 MG: 0.1 TABLET ORAL at 22:05

## 2019-11-17 RX ADMIN — Medication 10 ML: at 08:18

## 2019-11-17 RX ADMIN — CARVEDILOL 6.25 MG: 6.25 TABLET, FILM COATED ORAL at 08:18

## 2019-11-17 RX ADMIN — ACETYLCYSTEINE 600 MG: 200 SOLUTION ORAL; RESPIRATORY (INHALATION) at 06:59

## 2019-11-17 RX ADMIN — Medication 1 G: at 12:13

## 2019-11-18 VITALS
HEART RATE: 99 BPM | RESPIRATION RATE: 22 BRPM | SYSTOLIC BLOOD PRESSURE: 163 MMHG | WEIGHT: 192 LBS | BODY MASS INDEX: 30.13 KG/M2 | HEIGHT: 67 IN | OXYGEN SATURATION: 95 % | DIASTOLIC BLOOD PRESSURE: 86 MMHG | TEMPERATURE: 98.9 F

## 2019-11-18 LAB
BACTERIA UR QL AUTO: ABNORMAL /HPF
BILIRUB UR QL STRIP: NEGATIVE
CLARITY UR: CLEAR
COLOR UR: YELLOW
CULTURE, RESPIRATORY: NORMAL
GLUCOSE UR STRIP-MCNC: NEGATIVE MG/DL
GRAM STAIN RESULT: NORMAL
HGB UR QL STRIP.AUTO: NEGATIVE
HYALINE CASTS UR QL AUTO: ABNORMAL /LPF
KETONES UR QL STRIP: ABNORMAL
LEUKOCYTE ESTERASE UR QL STRIP.AUTO: ABNORMAL
NITRITE UR QL STRIP: NEGATIVE
PH UR STRIP.AUTO: 5.5 [PH] (ref 5–8)
PROT UR QL STRIP: NEGATIVE
RBC # UR: ABNORMAL /HPF
REF LAB TEST METHOD: ABNORMAL
SP GR UR STRIP: 1.02 (ref 1–1.03)
SQUAMOUS #/AREA URNS HPF: ABNORMAL /HPF
UROBILINOGEN UR QL STRIP: ABNORMAL
WBC UR QL AUTO: ABNORMAL /HPF

## 2019-11-18 PROCEDURE — 81001 URINALYSIS AUTO W/SCOPE: CPT | Performed by: INTERNAL MEDICINE

## 2019-11-18 RX ORDER — CEFDINIR 300 MG/1
300 CAPSULE ORAL 2 TIMES DAILY
Qty: 10 CAPSULE | Refills: 0 | Status: SHIPPED | OUTPATIENT
Start: 2019-11-18 | End: 2019-11-23

## 2019-11-18 RX ORDER — CLONIDINE HYDROCHLORIDE 0.1 MG/1
0.1 TABLET ORAL 2 TIMES DAILY PRN
Qty: 10 TABLET | Refills: 0 | Status: SHIPPED | OUTPATIENT
Start: 2019-11-18

## 2019-11-18 NOTE — ED PROVIDER NOTES
Subjective   Mr. Strickland is a very pleasant 69-year-old gentleman who approximately a few days ago started having cough congestion shortness of breath accompanied by elevated blood pressures he was seen and evaluated at [facility] and subsequently sent to [facility] where he was diagnosed with pneumonia is been treated with IV antibiotics and was discharged home there was a question of recurring aspiration events while at home he has had increasing cough congestion choking.  Is also noticed elevated blood pressures with systolics greater than 200s with diastolics greater than 110s            Review of Systems   Constitutional: Negative for chills and fever.   HENT: Negative for congestion, ear pain and sinus pressure.    Eyes: Negative for photophobia, pain and visual disturbance.   Respiratory: Positive for cough and shortness of breath. Negative for chest tightness and wheezing.    Cardiovascular: Negative for chest pain and palpitations.   Gastrointestinal: Negative for abdominal pain, diarrhea and nausea.   Endocrine: Negative for cold intolerance and heat intolerance.   Genitourinary: Negative for difficulty urinating and urgency.   Musculoskeletal: Negative for arthralgias, joint swelling and myalgias.   Skin: Negative for color change and wound.   Neurological: Negative for dizziness and headaches.   Hematological: Negative for adenopathy. Does not bruise/bleed easily.   Psychiatric/Behavioral: Negative for agitation, behavioral problems, confusion and decreased concentration.       Past Medical History:   Diagnosis Date   • Coronary artery disease    • Hyperlipidemia    • Hypertension    • Myocardial infarction (CMS/HCC)    • Stroke (CMS/HCC)        No Known Allergies    Past Surgical History:   Procedure Laterality Date   • APPENDECTOMY     • CAROTID ENDARTERECTOMY     • CHOLECYSTECTOMY     • COLON SURGERY         History reviewed. No pertinent family history.    Social History     Socioeconomic History   •  "Marital status: Single     Spouse name: Not on file   • Number of children: Not on file   • Years of education: Not on file   • Highest education level: Not on file   Tobacco Use   • Smoking status: Former Smoker     Last attempt to quit: 10/27/2019     Years since quittin.0   Substance and Sexual Activity   • Alcohol use: No     Frequency: Never   • Drug use: No       Prior to Admission medications    Medication Sig Start Date End Date Taking? Authorizing Provider   atorvastatin (LIPITOR) 80 MG tablet Take 80 mg by mouth Daily.   Yes Spring Chu MD   azithromycin (ZITHROMAX) 250 MG tablet Take 250 mg by mouth Daily. Take 2 tablets the first day, then 1 tablet daily for 4 days.   Yes Spring Chu MD   benzocaine (CHLORASEPTIC) 15 MG lozenge lozenge Take 1 lozenge by mouth Every 2 (Two) Hours As Needed.   Yes Spring Chu MD   carvedilol (COREG) 12.5 MG tablet Take 12.5 mg by mouth 2 (Two) Times a Day With Meals.   Yes Spring Chu MD   guaiFENesin (MUCINEX) 600 MG 12 hr tablet Take 1,200 mg by mouth 2 (Two) Times a Day.   Yes Spring Chu MD   HYDROcodone-acetaminophen (NORCO) 5-325 MG per tablet Take 1 tablet by mouth Every 6 (Six) Hours As Needed.   Yes Spring Chu MD   losartan (COZAAR) 50 MG tablet Take 50 mg by mouth 2 (Two) Times a Day.   Yes Spring Chu MD   NIFEdipine XL (PROCARDIA XL) 30 MG 24 hr tablet Take 30 mg by mouth 2 (Two) Times a Day.   Yes Spring Chu MD       /84   Pulse 89   Temp 98.5 °F (36.9 °C) (Oral)   Resp (!) 29   Ht 170.2 cm (67\")   Wt 87.1 kg (192 lb)   SpO2 94%   BMI 30.07 kg/m²     Objective   Physical Exam   Constitutional: He is oriented to person, place, and time. He appears well-developed and well-nourished.   HENT:   Head: Normocephalic and atraumatic.   Mouth/Throat: Oropharynx is clear and moist.   Eyes: EOM are normal. Pupils are equal, round, and reactive to light.   Neck: Normal range " of motion. Neck supple.   Cardiovascular: Normal rate, regular rhythm, normal heart sounds and intact distal pulses.   Pulmonary/Chest: Effort normal and breath sounds normal.   Abdominal: Soft. Bowel sounds are normal. There is no tenderness.   Musculoskeletal: Normal range of motion. He exhibits no edema.   Neurological: He is alert and oriented to person, place, and time. He has normal reflexes. No cranial nerve deficit.   Skin: Skin is warm and dry. No rash noted.   Psychiatric: He has a normal mood and affect. His behavior is normal. Thought content normal.   Nursing note and vitals reviewed.      Procedures         Lab Results (last 24 hours)     Procedure Component Value Units Date/Time    CBC & Differential [[id number]] Collected:  11/17/19 2302    Specimen:  Blood Updated:  11/17/19 2323    Narrative:       The following orders were created for panel order CBC & Differential.  Procedure                               Abnormality         Status                     ---------                               -----------         ------                     CBC Auto Differential[[id number]]        Abnormal            Final result                 Please view results for these tests on the individual orders.    Comprehensive Metabolic Panel [[id number]]  (Abnormal) Collected:  11/17/19 2302    Specimen:  Blood Updated:  11/17/19 2343     Glucose 116 mg/dL      BUN 19 mg/dL      Creatinine 0.93 mg/dL      Sodium 141 mmol/L      Potassium 4.2 mmol/L      Chloride 100 mmol/L      CO2 26.0 mmol/L      Calcium 9.2 mg/dL      Total Protein 7.3 g/dL      Albumin 4.40 g/dL      ALT (SGPT) 20 U/L      AST (SGOT) 15 U/L      Alkaline Phosphatase 147 U/L      Total Bilirubin 0.6 mg/dL      eGFR Non African Amer 80 mL/min/1.73      Globulin 2.9 gm/dL      A/G Ratio 1.5 g/dL      BUN/Creatinine Ratio 20.4     Anion Gap 15.0 mmol/L     Narrative:       GFR Normal >60  Chronic Kidney Disease <60  Kidney Failure <15    BNP [[id number]]   (Normal) Collected:  11/17/19 2302    Specimen:  Blood Updated:  11/17/19 2343     proBNP 206.9 pg/mL     Narrative:       Among patients with dyspnea, NT-proBNP is highly sensitive for the detection of acute congestive heart failure. In addition NT-proBNP of <300 pg/ml effectively rules out acute congestive heart failure with 99% negative predictive value.    Lactic Acid, Plasma [[id number]]  (Normal) Collected:  11/17/19 2302    Specimen:  Blood Updated:  11/17/19 2340     Lactate 0.9 mmol/L     CBC Auto Differential [[id number]]  (Abnormal) Collected:  11/17/19 2302    Specimen:  Blood Updated:  11/17/19 2323     WBC 8.87 10*3/mm3      RBC 6.02 10*6/mm3      Hemoglobin 16.3 g/dL      Hematocrit 47.4 %      MCV 78.7 fL      MCH 27.1 pg      MCHC 34.4 g/dL      RDW 13.3 %      RDW-SD 37.7 fl      MPV 10.1 fL      Platelets 248 10*3/mm3      Neutrophil % 75.7 %      Lymphocyte % 13.1 %      Monocyte % 7.4 %      Eosinophil % 2.7 %      Basophil % 0.6 %      Immature Grans % 0.5 %      Neutrophils, Absolute 6.72 10*3/mm3      Lymphocytes, Absolute 1.16 10*3/mm3      Monocytes, Absolute 0.66 10*3/mm3      Eosinophils, Absolute 0.24 10*3/mm3      Basophils, Absolute 0.05 10*3/mm3      Immature Grans, Absolute 0.04 10*3/mm3      nRBC 0.0 /100 WBC     Blood Culture With NICOLÁS - Blood, Arm, Left [[id number]] Collected:  11/17/19 2302    Specimen:  Blood from Arm, Left Updated:  11/17/19 2321    Blood Culture With NICOLÁS - Blood, Arm, Left [[id number]] Collected:  11/17/19 2324    Specimen:  Blood from Arm, Left Updated:  11/17/19 2358          XR Chest 1 View   ED Interpretation   No infiltrate noted.          ED Course  ED Course as of Nov 18 0216   Mon Nov 18, 2019   0213 Is doing quite well while he is here he will be discharged home and will follow-up with primary care provider.  [RW]      ED Course User Index  [RW] Corey Enriquez MD          [facility]    Final diagnoses:   Hypertensive urgency   Acute bronchitis,  unspecified organism        Corey Enriquez MD  11/18/19 2101

## 2019-11-18 NOTE — DISCHARGE INSTRUCTIONS
Acute Bronchitis, Adult  Acute bronchitis is when air tubes (bronchi) in the lungs suddenly get swollen. The condition can make it hard to breathe. It can also cause these symptoms:  · A cough.  · Coughing up clear, yellow, or green mucus.  · Wheezing.  · Chest congestion.  · Shortness of breath.  · A fever.  · Body aches.  · Chills.  · A sore throat.  Follow these instructions at home:    Medicines  · Take over-the-counter and prescription medicines only as told by your doctor.  · If you were prescribed an antibiotic medicine, take it as told by your doctor. Do not stop taking the antibiotic even if you start to feel better.  General instructions  · Rest.  · Drink enough fluids to keep your pee (urine) pale yellow.  · Avoid smoking and secondhand smoke. If you smoke and you need help quitting, ask your doctor. Quitting will help your lungs heal faster.  · Use an inhaler, cool mist vaporizer, or humidifier as told by your doctor.  · Keep all follow-up visits as told by your doctor. This is important.  How is this prevented?  To lower your risk of getting this condition again:  · Wash your hands often with soap and water. If you cannot use soap and water, use hand .  · Avoid contact with people who have cold symptoms.  · Try not to touch your hands to your mouth, nose, or eyes.  · Make sure to get the flu shot every year.  Contact a doctor if:  · Your symptoms do not get better in 2 weeks.  Get help right away if:  · You cough up blood.  · You have chest pain.  · You have very bad shortness of breath.  · You become dehydrated.  · You faint (pass out) or keep feeling like you are going to pass out.  · You keep throwing up (vomiting).  · You have a very bad headache.  · Your fever or chills gets worse.  This information is not intended to replace advice given to you by your health care provider. Make sure you discuss any questions you have with your health care provider.  Document Released: 06/05/2009 Document  Revised: 08/01/2018 Document Reviewed: 06/07/2017  [employer] Patient Education © 2019 [employer]

## 2019-11-22 LAB — BACTERIA SPEC AEROBE CULT: NORMAL

## 2019-11-23 LAB — BACTERIA SPEC AEROBE CULT: NORMAL

## 2019-12-30 ENCOUNTER — TRANSCRIBE ORDERS (OUTPATIENT)
Dept: PHYSICAL THERAPY | Facility: CLINIC | Age: 69
End: 2019-12-30

## 2019-12-30 DIAGNOSIS — R13.10 DYSPHAGIA, UNSPECIFIED TYPE: Primary | ICD-10-CM

## 2019-12-30 DIAGNOSIS — I63.9 CEREBRAL INFARCTION, UNSPECIFIED MECHANISM (HCC): ICD-10-CM

## 2020-01-22 ENCOUNTER — APPOINTMENT (OUTPATIENT)
Dept: ULTRASOUND IMAGING | Facility: HOSPITAL | Age: 70
End: 2020-01-22

## 2020-01-22 ENCOUNTER — APPOINTMENT (OUTPATIENT)
Dept: MRI IMAGING | Facility: HOSPITAL | Age: 70
End: 2020-01-22

## 2020-01-22 ENCOUNTER — HOSPITAL ENCOUNTER (OUTPATIENT)
Facility: HOSPITAL | Age: 70
Setting detail: OBSERVATION
Discharge: HOME | End: 2020-01-22
Attending: FAMILY MEDICINE | Admitting: FAMILY MEDICINE

## 2020-01-22 ENCOUNTER — APPOINTMENT (OUTPATIENT)
Dept: CARDIOLOGY | Facility: HOSPITAL | Age: 70
End: 2020-01-22

## 2020-01-22 ENCOUNTER — APPOINTMENT (OUTPATIENT)
Dept: GENERAL RADIOLOGY | Facility: HOSPITAL | Age: 70
End: 2020-01-22

## 2020-01-22 VITALS
SYSTOLIC BLOOD PRESSURE: 163 MMHG | HEART RATE: 68 BPM | DIASTOLIC BLOOD PRESSURE: 87 MMHG | OXYGEN SATURATION: 96 % | WEIGHT: 180.56 LBS | RESPIRATION RATE: 18 BRPM | BODY MASS INDEX: 28.34 KG/M2 | TEMPERATURE: 98.4 F | HEIGHT: 67 IN

## 2020-01-22 DIAGNOSIS — R13.12 OROPHARYNGEAL DYSPHAGIA: Primary | ICD-10-CM

## 2020-01-22 DIAGNOSIS — R26.9 GAIT ABNORMALITY: ICD-10-CM

## 2020-01-22 PROBLEM — I63.9 CVA (CEREBRAL VASCULAR ACCIDENT): Status: ACTIVE | Noted: 2020-01-22

## 2020-01-22 PROBLEM — R20.2 PARESTHESIAS: Status: ACTIVE | Noted: 2020-01-22

## 2020-01-22 PROBLEM — E78.49 OTHER HYPERLIPIDEMIA: Status: ACTIVE | Noted: 2020-01-22

## 2020-01-22 PROBLEM — I10 ESSENTIAL HYPERTENSION: Status: ACTIVE | Noted: 2020-01-22

## 2020-01-22 LAB
ALBUMIN SERPL-MCNC: 4.2 G/DL (ref 3.5–5.2)
ALBUMIN/GLOB SERPL: 1.6 G/DL
ALP SERPL-CCNC: 160 U/L (ref 39–117)
ALT SERPL W P-5'-P-CCNC: 13 U/L (ref 1–41)
ANION GAP SERPL CALCULATED.3IONS-SCNC: 9 MMOL/L (ref 5–15)
AST SERPL-CCNC: 13 U/L (ref 1–40)
BH CV ECHO MEAS - AO MAX PG (FULL): 5.2 MMHG
BH CV ECHO MEAS - AO MAX PG: 12.7 MMHG
BH CV ECHO MEAS - AO MEAN PG (FULL): 3 MMHG
BH CV ECHO MEAS - AO MEAN PG: 7 MMHG
BH CV ECHO MEAS - AO ROOT AREA (BSA CORRECTED): 1.7
BH CV ECHO MEAS - AO ROOT AREA: 8 CM^2
BH CV ECHO MEAS - AO ROOT DIAM: 3.2 CM
BH CV ECHO MEAS - AO V2 MAX: 178 CM/SEC
BH CV ECHO MEAS - AO V2 MEAN: 126 CM/SEC
BH CV ECHO MEAS - AO V2 VTI: 45.3 CM
BH CV ECHO MEAS - AVA(I,A): 3 CM^2
BH CV ECHO MEAS - AVA(I,D): 3 CM^2
BH CV ECHO MEAS - AVA(V,A): 2.7 CM^2
BH CV ECHO MEAS - AVA(V,D): 2.7 CM^2
BH CV ECHO MEAS - BSA(HAYCOCK): 2 M^2
BH CV ECHO MEAS - BSA: 1.9 M^2
BH CV ECHO MEAS - BZI_BMI: 28.2 KILOGRAMS/M^2
BH CV ECHO MEAS - BZI_METRIC_HEIGHT: 170.2 CM
BH CV ECHO MEAS - BZI_METRIC_WEIGHT: 81.6 KG
BH CV ECHO MEAS - EDV(CUBED): 58.9 ML
BH CV ECHO MEAS - EDV(MOD-SP4): 78 ML
BH CV ECHO MEAS - EDV(TEICH): 65.5 ML
BH CV ECHO MEAS - EF(CUBED): 66.2 %
BH CV ECHO MEAS - EF(MOD-SP4): 60.8 %
BH CV ECHO MEAS - EF(TEICH): 58.4 %
BH CV ECHO MEAS - ESV(CUBED): 19.9 ML
BH CV ECHO MEAS - ESV(MOD-SP4): 30.6 ML
BH CV ECHO MEAS - ESV(TEICH): 27.3 ML
BH CV ECHO MEAS - FS: 30.3 %
BH CV ECHO MEAS - IVS/LVPW: 1.3
BH CV ECHO MEAS - IVSD: 1.8 CM
BH CV ECHO MEAS - LA DIMENSION: 3.9 CM
BH CV ECHO MEAS - LA/AO: 1.2
BH CV ECHO MEAS - LAT PEAK E' VEL: 9 CM/SEC
BH CV ECHO MEAS - LV DIASTOLIC VOL/BSA (35-75): 40.3 ML/M^2
BH CV ECHO MEAS - LV MASS(C)D: 259.6 GRAMS
BH CV ECHO MEAS - LV MASS(C)DI: 134.3 GRAMS/M^2
BH CV ECHO MEAS - LV MAX PG: 7.5 MMHG
BH CV ECHO MEAS - LV MEAN PG: 4 MMHG
BH CV ECHO MEAS - LV SYSTOLIC VOL/BSA (12-30): 15.8 ML/M^2
BH CV ECHO MEAS - LV V1 MAX: 137 CM/SEC
BH CV ECHO MEAS - LV V1 MEAN: 99.5 CM/SEC
BH CV ECHO MEAS - LV V1 VTI: 38.8 CM
BH CV ECHO MEAS - LVIDD: 3.9 CM
BH CV ECHO MEAS - LVIDS: 2.7 CM
BH CV ECHO MEAS - LVLD AP4: 8.4 CM
BH CV ECHO MEAS - LVLS AP4: 6.8 CM
BH CV ECHO MEAS - LVOT AREA (M): 3.5 CM^2
BH CV ECHO MEAS - LVOT AREA: 3.5 CM^2
BH CV ECHO MEAS - LVOT DIAM: 2.1 CM
BH CV ECHO MEAS - LVPWD: 1.5 CM
BH CV ECHO MEAS - MED PEAK E' VEL: 7.2 CM/SEC
BH CV ECHO MEAS - MV A MAX VEL: 85.4 CM/SEC
BH CV ECHO MEAS - MV DEC SLOPE: 290 CM/SEC^2
BH CV ECHO MEAS - MV DEC TIME: 0.3 SEC
BH CV ECHO MEAS - MV E MAX VEL: 85.9 CM/SEC
BH CV ECHO MEAS - MV E/A: 1
BH CV ECHO MEAS - RAP SYSTOLE: 10 MMHG
BH CV ECHO MEAS - RVSP: 35.2 MMHG
BH CV ECHO MEAS - SI(AO): 188.4 ML/M^2
BH CV ECHO MEAS - SI(CUBED): 20.2 ML/M^2
BH CV ECHO MEAS - SI(LVOT): 69.5 ML/M^2
BH CV ECHO MEAS - SI(MOD-SP4): 24.5 ML/M^2
BH CV ECHO MEAS - SI(TEICH): 19.8 ML/M^2
BH CV ECHO MEAS - SV(AO): 364.3 ML
BH CV ECHO MEAS - SV(CUBED): 39 ML
BH CV ECHO MEAS - SV(LVOT): 134.4 ML
BH CV ECHO MEAS - SV(MOD-SP4): 47.4 ML
BH CV ECHO MEAS - SV(TEICH): 38.2 ML
BH CV ECHO MEAS - TR MAX VEL: 251 CM/SEC
BH CV ECHO MEASUREMENTS AVERAGE E/E' RATIO: 10.6
BILIRUB SERPL-MCNC: 0.5 MG/DL (ref 0.2–1.2)
BUN BLD-MCNC: 21 MG/DL (ref 8–23)
BUN/CREAT SERPL: 28.8 (ref 7–25)
CALCIUM SPEC-SCNC: 9.2 MG/DL (ref 8.6–10.5)
CHLORIDE SERPL-SCNC: 105 MMOL/L (ref 98–107)
CO2 SERPL-SCNC: 29 MMOL/L (ref 22–29)
CREAT BLD-MCNC: 0.73 MG/DL (ref 0.76–1.27)
DEPRECATED RDW RBC AUTO: 40.5 FL (ref 37–54)
ERYTHROCYTE [DISTWIDTH] IN BLOOD BY AUTOMATED COUNT: 13.8 % (ref 12.3–15.4)
GFR SERPL CREATININE-BSD FRML MDRD: 106 ML/MIN/1.73
GLOBULIN UR ELPH-MCNC: 2.7 GM/DL
GLUCOSE BLD-MCNC: 108 MG/DL (ref 65–99)
GLUCOSE BLDC GLUCOMTR-MCNC: 92 MG/DL (ref 70–130)
HCT VFR BLD AUTO: 38.7 % (ref 37.5–51)
HGB BLD-MCNC: 12.8 G/DL (ref 13–17.7)
LEFT ATRIUM VOLUME INDEX: 30.4 ML/M2
LEFT ATRIUM VOLUME: 58.6 CM3
MAXIMAL PREDICTED HEART RATE: 149 BPM
MCH RBC QN AUTO: 26.8 PG (ref 26.6–33)
MCHC RBC AUTO-ENTMCNC: 33.1 G/DL (ref 31.5–35.7)
MCV RBC AUTO: 81.1 FL (ref 79–97)
PLATELET # BLD AUTO: 235 10*3/MM3 (ref 140–450)
PMV BLD AUTO: 9.8 FL (ref 6–12)
POTASSIUM BLD-SCNC: 4 MMOL/L (ref 3.5–5.2)
PROT SERPL-MCNC: 6.9 G/DL (ref 6–8.5)
RBC # BLD AUTO: 4.77 10*6/MM3 (ref 4.14–5.8)
SODIUM BLD-SCNC: 143 MMOL/L (ref 136–145)
STRESS TARGET HR: 127 BPM
WBC NRBC COR # BLD: 7.13 10*3/MM3 (ref 3.4–10.8)

## 2020-01-22 PROCEDURE — 70553 MRI BRAIN STEM W/O & W/DYE: CPT

## 2020-01-22 PROCEDURE — G0378 HOSPITAL OBSERVATION PER HR: HCPCS

## 2020-01-22 PROCEDURE — 80053 COMPREHEN METABOLIC PANEL: CPT | Performed by: FAMILY MEDICINE

## 2020-01-22 PROCEDURE — 99223 1ST HOSP IP/OBS HIGH 75: CPT | Performed by: CLINICAL NURSE SPECIALIST

## 2020-01-22 PROCEDURE — 82962 GLUCOSE BLOOD TEST: CPT

## 2020-01-22 PROCEDURE — 70551 MRI BRAIN STEM W/O DYE: CPT

## 2020-01-22 PROCEDURE — 93880 EXTRACRANIAL BILAT STUDY: CPT

## 2020-01-22 PROCEDURE — 99234 HOSP IP/OBS SM DT SF/LOW 45: CPT | Performed by: FAMILY MEDICINE

## 2020-01-22 PROCEDURE — 92610 EVALUATE SWALLOWING FUNCTION: CPT | Performed by: SPEECH-LANGUAGE PATHOLOGIST

## 2020-01-22 PROCEDURE — 93306 TTE W/DOPPLER COMPLETE: CPT

## 2020-01-22 PROCEDURE — 74230 X-RAY XM SWLNG FUNCJ C+: CPT

## 2020-01-22 PROCEDURE — A9577 INJ MULTIHANCE: HCPCS | Performed by: FAMILY MEDICINE

## 2020-01-22 PROCEDURE — 97161 PT EVAL LOW COMPLEX 20 MIN: CPT

## 2020-01-22 PROCEDURE — 93880 EXTRACRANIAL BILAT STUDY: CPT | Performed by: SURGERY

## 2020-01-22 PROCEDURE — 0 GADOBENATE DIMEGLUMINE 529 MG/ML SOLUTION: Performed by: FAMILY MEDICINE

## 2020-01-22 PROCEDURE — 85027 COMPLETE CBC AUTOMATED: CPT | Performed by: FAMILY MEDICINE

## 2020-01-22 PROCEDURE — 97165 OT EVAL LOW COMPLEX 30 MIN: CPT

## 2020-01-22 PROCEDURE — 93306 TTE W/DOPPLER COMPLETE: CPT | Performed by: INTERNAL MEDICINE

## 2020-01-22 PROCEDURE — 92611 MOTION FLUOROSCOPY/SWALLOW: CPT

## 2020-01-22 RX ORDER — ASPIRIN 81 MG/1
81 TABLET, CHEWABLE ORAL DAILY
COMMUNITY

## 2020-01-22 RX ORDER — SODIUM CHLORIDE 9 MG/ML
30 INJECTION, SOLUTION INTRAVENOUS CONTINUOUS
Status: DISCONTINUED | OUTPATIENT
Start: 2020-01-22 | End: 2020-01-22 | Stop reason: HOSPADM

## 2020-01-22 RX ORDER — ATORVASTATIN CALCIUM 40 MG/1
80 TABLET, FILM COATED ORAL NIGHTLY
Status: DISCONTINUED | OUTPATIENT
Start: 2020-01-22 | End: 2020-01-22 | Stop reason: HOSPADM

## 2020-01-22 RX ORDER — CLOPIDOGREL BISULFATE 75 MG/1
75 TABLET ORAL DAILY
Status: DISCONTINUED | OUTPATIENT
Start: 2020-01-22 | End: 2020-01-22 | Stop reason: HOSPADM

## 2020-01-22 RX ORDER — CLOPIDOGREL BISULFATE 75 MG/1
75 TABLET ORAL DAILY
COMMUNITY
End: 2020-05-26

## 2020-01-22 RX ORDER — SODIUM CHLORIDE 0.9 % (FLUSH) 0.9 %
10 SYRINGE (ML) INJECTION AS NEEDED
Status: DISCONTINUED | OUTPATIENT
Start: 2020-01-22 | End: 2020-01-22 | Stop reason: HOSPADM

## 2020-01-22 RX ORDER — SODIUM CHLORIDE 0.9 % (FLUSH) 0.9 %
10 SYRINGE (ML) INJECTION EVERY 12 HOURS SCHEDULED
Status: DISCONTINUED | OUTPATIENT
Start: 2020-01-22 | End: 2020-01-22 | Stop reason: HOSPADM

## 2020-01-22 RX ORDER — AMLODIPINE BESYLATE 10 MG/1
10 TABLET ORAL
Status: DISCONTINUED | OUTPATIENT
Start: 2020-01-22 | End: 2020-01-22 | Stop reason: HOSPADM

## 2020-01-22 RX ORDER — LOSARTAN POTASSIUM 50 MG/1
100 TABLET ORAL
Status: DISCONTINUED | OUTPATIENT
Start: 2020-01-22 | End: 2020-01-22 | Stop reason: HOSPADM

## 2020-01-22 RX ORDER — ASPIRIN 81 MG/1
81 TABLET, CHEWABLE ORAL DAILY
Status: DISCONTINUED | OUTPATIENT
Start: 2020-01-22 | End: 2020-01-22 | Stop reason: HOSPADM

## 2020-01-22 RX ORDER — CARVEDILOL 6.25 MG/1
12.5 TABLET ORAL EVERY 12 HOURS SCHEDULED
Status: DISCONTINUED | OUTPATIENT
Start: 2020-01-22 | End: 2020-01-22 | Stop reason: HOSPADM

## 2020-01-22 RX ORDER — FAMOTIDINE 40 MG/1
40 TABLET, FILM COATED ORAL DAILY
COMMUNITY
End: 2020-05-26

## 2020-01-22 RX ORDER — AMLODIPINE BESYLATE 5 MG/1
5 TABLET ORAL DAILY
COMMUNITY

## 2020-01-22 RX ADMIN — GADOBENATE DIMEGLUMINE 16 ML: 529 INJECTION, SOLUTION INTRAVENOUS at 13:15

## 2020-01-22 RX ADMIN — BARIUM SULFATE 20 ML: 400 SUSPENSION ORAL at 10:05

## 2020-01-22 RX ADMIN — BARIUM SULFATE 20 ML: 0.81 POWDER, FOR SUSPENSION ORAL at 10:05

## 2020-01-22 RX ADMIN — SODIUM CHLORIDE 30 ML/HR: 9 INJECTION, SOLUTION INTRAVENOUS at 05:33

## 2020-01-22 RX ADMIN — ASPIRIN 81 MG: 81 TABLET, CHEWABLE ORAL at 10:46

## 2020-01-22 RX ADMIN — CARVEDILOL 12.5 MG: 6.25 TABLET, FILM COATED ORAL at 10:46

## 2020-01-22 RX ADMIN — CLOPIDOGREL 75 MG: 75 TABLET, FILM COATED ORAL at 10:47

## 2020-01-22 RX ADMIN — BARIUM SULFATE 20 ML: 400 PASTE ORAL at 10:05

## 2020-01-22 RX ADMIN — SODIUM CHLORIDE, PRESERVATIVE FREE 10 ML: 5 INJECTION INTRAVENOUS at 10:44

## 2020-01-22 RX ADMIN — LOSARTAN POTASSIUM 100 MG: 50 TABLET, FILM COATED ORAL at 10:46

## 2020-01-22 RX ADMIN — AMLODIPINE BESYLATE 10 MG: 10 TABLET ORAL at 10:48

## 2020-01-22 NOTE — PLAN OF CARE
Problem: Patient Care Overview  Goal: Plan of Care Review  Outcome: Ongoing (interventions implemented as appropriate)  Flowsheets (Taken 1/22/2020 0918)  Progress: no change (eval)  Plan of Care Reviewed With: patient  Outcome Summary: ST clinical bedside swallow evaluation completed d/t failed swallow screen. 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 Pt reports getting Botox injections on back of tongue; however, it is suspected injections were likely for vocal cord paralysis. Pt stated he receieved outpt tx for swallowing with e-stim and beginning PO diet. Pt stated he no longer utilizes G-tube for nutrition but does still place meds in g-tube and continues to flush tube to keep clean. Pt stated he stopped using feeding tube for nutrition approximately 1 week ago. Pt reports eating PO diet with thickened liquids that are slightly thick in consistency. Pt presented with full range of consistencies at bedside except mech soft. Pt demo with weak laryngeal elevation with all trials. Slight delay in swallow initiation with puree and honey. Questionable delayed wet vocal quality with honey thick liquids 2x. Mild oral holding when utilizing straw with nectar and thin. Adequate mastication and oral clearing with regular solids. ST feels pt would benefit from current objective study to determine most appropriate PO diet at this time d/t hx of aspiration and use of g-tube. MBS ordered for this date. Pt okay for small sips of water prior to study, otherwise remain NPO. ST to follow and treat.

## 2020-01-22 NOTE — PLAN OF CARE
Problem: Patient Care Overview  Goal: Plan of Care Review  Outcome: Ongoing (interventions implemented as appropriate)  Flowsheets (Taken 1/22/2020 5222)  Progress: no change  Plan of Care Reviewed With: patient  Outcome Summary: Pt admitted from [facility] ED with stroke symptoms.  Spoke with Dr. Colon via telephone and received verbal orders (as Epic was down for maintenance at the time).  Verified and read back home medications he ordered to re-start.  However, upon reviewing pt's home med list, Losartan is listed as being 50mg BID, current order is 100mg daily.  Spoke with nurse at [facility] ED who stated she would ask Dr. Colon which order he wanted.  Awaiting return call.  Pt NIH=1.  Has left side facial droop.  MRI and Carotid studies scheduled for today.

## 2020-01-22 NOTE — THERAPY EVALUATION
Acute Care - Speech Language Pathology   Swallow Initial Evaluation [facility]     Patient Name: Daniele Delong  : 1950  MRN: [id number]  Today's Date: 2020               Admit Date: 2020  ST clinical bedside swallow evaluation completed d/t failed swallow screen. 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 Pt reports getting Botox injections on back of tongue; however, it is suspected injections were likely for vocal cord paralysis. Pt stated he receieved outpt tx for swallowing with e-stim and beginning PO diet. Pt stated he no longer utilizes G-tube for nutrition but does still place meds in g-tube and continues to flush tube to keep clean. Pt stated he stopped using feeding tube for nutrition approximately 1 week ago. Pt reports eating PO diet with thickened liquids that are slightly thick in consistency. Pt presented with full range of consistencies at bedside except mech soft. Pt demo with weak laryngeal elevation with all trials. Slight delay in swallow initiation with puree and honey. Questionable delayed wet vocal quality with honey thick liquids 2x. Mild oral holding when utilizing straw with nectar and thin. Adequate mastication and oral clearing with regular solids. ST feels pt would benefit from current objective study to determine most appropriate PO diet at this time d/t hx of aspiration and use of g-tube. MBS ordered for this date. Pt okay for small sips of water prior to study, otherwise remain NPO. ST to follow and treat.   Estelita Ziegler CCC-SLP 2020 9:20 AM      Visit Dx:     ICD-10-CM ICD-9-CM   1. Oropharyngeal dysphagia R13.12 787.22     Patient Active Problem List   Diagnosis   • CVA (cerebral vascular accident) (CMS/HCC)     Past Medical History:   Diagnosis Date   • Coronary artery disease    • Hyperlipidemia    • Hypertension    • Myocardial infarction (CMS/HCC)    • Stroke (CMS/HCC)       Past Surgical History:   Procedure Laterality Date   • APPENDECTOMY     • CAROTID ENDARTERECTOMY     • CHOLECYSTECTOMY     • COLON SURGERY          SWALLOW EVALUATION (last 72 hours)      SLP Adult Swallow Evaluation     Row Name 01/22/20 0818                   Rehab Evaluation    Document Type  evaluation  -BN        Subjective Information  no complaints  -BN        Patient Observations  alert;cooperative  -BN        Patient/Family Observations  no family present  -BN        Patient Effort  good  -BN           General Information    Patient Profile Reviewed  yes  -BN        Pertinent History Of Current Problem  acute CVA. Hx CVA in November 2019 with left facial droop and vocal cord paresis. G-tube placed d/t aspiration. Hx of MI, CAD, HTN  -BN        Current Method of Nutrition  NPO  -BN        Precautions/Limitations, Vision  WFL;for purposes of eval  -BN        Precautions/Limitations, Hearing  WFL  -BN        Prior Level of Function-Communication  WFL  -BN        Prior Level of Function-Swallowing  nectar thick liquids;regular textures  -BN        Plans/Goals Discussed with  patient  -BN        Barriers to Rehab  none identified  -BN        Patient's Goals for Discharge  return to PO diet  -BN           Pain Assessment    Additional Documentation  Pain Scale: FACES Pre/Post-Treatment (Group)  -BN           Pain Scale: FACES Pre/Post-Treatment    Pain: FACES Scale, Pretreatment  0-->no hurt  -BN        Pain: FACES Scale, Post-Treatment  0-->no hurt  -BN           Oral Motor and Function    Dentition Assessment  upper dentures/partial in place;natural, present and adequate  -BN        Secretion Management  WNL/WFL  -BN        Mucosal Quality  moist, healthy  -BN        Volitional Swallow  delayed  -BN           Oral Musculature and Cranial Nerve Assessment    Oral Motor General Assessment  oral labial or buccal impairment  -BN        Oral Labial or Buccal Impairment, Detail, Cranial Nerve VII (Facial):  left  labial droop residual from previous CVA 11/2019  -BN           General Eating/Swallowing Observations    Respiratory Support Currently in Use  room air  -BN        Eating/Swallowing Skills  self-fed;fed by staff/caregiver  -BN        Positioning During Eating  upright in bed  -BN        Utensils Used  spoon;straw  -BN        Consistencies Trialed  regular textures;pudding thick;honey-thick liquids;nectar/syrup-thick liquids;thin liquids  -BN           Clinical Swallow Eval    Oral Prep Phase  impaired  -BN        Oral Transit  impaired  -BN        Oral Residue  WFL  -BN        Pharyngeal Phase  suspected pharyngeal impairment  -BN        Esophageal Phase  unremarkable  -BN        Clinical Swallow Evaluation Summary  ST clinical bedside swallow evaluation completed d/t failed swallow screen. 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 Pt reports getting Botox injections on back of tongue; however, it is suspected injections were likely for vocal cord paralysis. Pt stated he receieved outpt tx for swallowing with e-stim and beginning PO diet. Pt stated he no longer utilizes G-tube for nutrition but does still place meds in g-tube and continues to flush tube to keep clean. Pt stated he stopped using feeding tube for nutrition approximately 1 week ago. Pt reports eating PO diet with thickened liquids that are slightly thick in consistency. Pt presented with full range of consistencies at bedside except mech soft. Pt demo with weak laryngeal elevation with all trials. Slight delay in swallow initiation with puree and honey. Questionable delayed wet vocal quality with honey thick liquids 2x. Mild oral holding when utilizing straw with nectar and thin. Adequate mastication and oral clearing with regular solids. ST feels pt would benefit from current objective study to determine most appropriate PO diet at this time d/t hx of aspiration and use of  g-tube. MBS ordered for this date. Pt okay for small sips of water prior to study, otherwise remain NPO. ST to follow and treat.   -BN           Oral Prep Concerns    Oral Prep Concerns  oral holding  -BN        Oral Holding  thin;nectar  -BN           Oral Transit Concerns    Oral Transit Concerns  delayed initiation of bolus transit  -BN        Delayed Intiation of Bolus Transit  pudding;honey  -BN           Pharyngeal Phase Concerns    Pharyngeal Phase Concerns  wet vocal quality  -BN        Wet Vocal Quality  honey  -BN           Clinical Impression    SLP Swallowing Diagnosis  suspected pharyngeal dysfunction  -BN        Functional Impact  risk of aspiration/pneumonia  -BN        Rehab Potential/Prognosis, Swallowing  good, to achieve stated therapy goals  -BN        Swallow Criteria for Skilled Therapeutic Interventions Met  demonstrates skilled criteria  -BN           Recommendations    Therapy Frequency (Swallow)  at least;3 days per week  -BN        Predicted Duration Therapy Intervention (Days)  until discharge  -BN        SLP Diet Recommendation  NPO  -BN        Recommended Diagnostics  VFSS (MBS)  -BN        Recommended Precautions and Strategies  upright posture during/after eating;small bites of food and sips of liquid  -BN        SLP Rec. for Method of Medication Administration  meds whole;with pudding or applesauce  -BN        Monitor for Signs of Aspiration  yes;notify SLP if any concerns;cough;gurgly voice;throat clearing;pneumonia;right lower lobe infiltrates  -BN        Anticipated Dischage Disposition  home with OP services  -BN           Swallow Goals (SLP)    Oral Nutrition/Hydration Goal Selection (SLP)  oral nutrition/hydration, SLP goal 1  -BN           Oral Nutrition/Hydration Goal 1 (SLP)    Oral Nutrition/Hydration Goal 1, SLP  Pt will tolerate LRD without s/s of aspiration  -BN        Time Frame (Oral Nutrition/Hydration Goal 1, SLP)  short term goal (STG);by discharge  -BN         Barriers (Oral Nutrition/Hydration Goal 1, SLP)  n/a  -BN        Progress/Outcomes (Oral Nutrition/Hydration Goal 1, SLP)  goal ongoing  -BN          User Key  (r) = Recorded By, (t) = Taken By, (c) = Cosigned By    Initials Name Effective Dates    Estelita Nj, CCC-SLP 04/03/18 -           EDUCATION  The patient has been educated in the following areas:   Dysphagia (Swallowing Impairment).    SLP Recommendation and Plan  SLP Swallowing Diagnosis: suspected pharyngeal dysfunction  SLP Diet Recommendation: NPO  Recommended Precautions and Strategies: upright posture during/after eating, small bites of food and sips of liquid  SLP Rec. for Method of Medication Administration: meds whole, with pudding or applesauce     Monitor for Signs of Aspiration: yes, notify SLP if any concerns, cough, gurgly voice, throat clearing, pneumonia, right lower lobe infiltrates  Recommended Diagnostics: VFSS (MBS)  Swallow Criteria for Skilled Therapeutic Interventions Met: demonstrates skilled criteria  Anticipated Dischage Disposition: home with OP services  Rehab Potential/Prognosis, Swallowing: good, to achieve stated therapy goals  Therapy Frequency (Swallow): at least, 3 days per week  Predicted Duration Therapy Intervention (Days): until discharge       Plan of Care Reviewed With: patient  Progress: no change(eval)  Outcome Summary: ST clinical bedside swallow evaluation completed d/t failed swallow screen. 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 Pt reports getting Botox injections on back of tongue; however, it is suspected injections were likely for vocal cord paralysis. Pt stated he receieved outpt tx for swallowing with e-stim and beginning PO diet. Pt stated he no longer utilizes G-tube for nutrition but does still place meds in g-tube and continues to flush tube to keep clean. Pt stated he stopped using feeding tube for nutrition  approximately 1 week ago. Pt reports eating PO diet with thickened liquids that are slightly thick in consistency. Pt presented with full range of consistencies at bedside except mech soft. Pt demo with weak laryngeal elevation with all trials. Slight delay in swallow initiation with puree and honey. Questionable delayed wet vocal quality with honey thick liquids 2x. Mild oral holding when utilizing straw with nectar and thin. Adequate mastication and oral clearing with regular solids. ST feels pt would benefit from current objective study to determine most appropriate PO diet at this time d/t hx of aspiration and use of g-tube. MBS ordered for this date. Pt okay for small sips of water prior to study, otherwise remain NPO. ST to follow and treat.    SLP GOALS     Row Name 01/22/20 0818             Oral Nutrition/Hydration Goal 1 (SLP)    Oral Nutrition/Hydration Goal 1, SLP  Pt will tolerate LRD without s/s of aspiration  -BN      Time Frame (Oral Nutrition/Hydration Goal 1, SLP)  short term goal (STG);by discharge  -BN      Barriers (Oral Nutrition/Hydration Goal 1, SLP)  n/a  -BN      Progress/Outcomes (Oral Nutrition/Hydration Goal 1, SLP)  goal ongoing  -BN        User Key  (r) = Recorded By, (t) = Taken By, (c) = Cosigned By    Initials Name Provider Type    Estelita Nj CCC-SLP Speech and Language Pathologist             Time Calculation:   Time Calculation- SLP     Row Name 01/22/20 0919             Time Calculation- SLP    SLP Start Time  0818  -BN      SLP Stop Time  0919  -BN      SLP Time Calculation (min)  61 min  -BN      SLP Received On  01/22/20  -BN      SLP Goal Re-Cert Due Date  02/01/20  -BN        User Key  (r) = Recorded By, (t) = Taken By, (c) = Cosigned By    Initials Name Provider Type    Estelita Nj CCC-SLP Speech and Language Pathologist          Therapy Charges for Today     Code Description Service Date Service Provider Modifiers Qty    [id number]  [facility]  ORAL PHARYNG SWALLOW 4 1/22/2020 Estelita Ziegler, CCC-SLP GN 1               Estelita Ziegler CCC-SLP  1/22/2020

## 2020-01-22 NOTE — H&P
"[facility]  HISTORY AND PHYSICAL    Date of Admission: 1/22/2020  Primary Care Physician: Tim Peguero MD    Subjective    Chief Complaint: \"we think he had another stroke\"    This is a pleasant 69 yr old with previous hx of cva in November 2019(flown to [place]) presented to the [facility] ed with 12hr hx of facial numbness, left facial droop, change in gait. Transferred to [facility] for neurologic consultation.      Review of Systems   HENT: Negative.    Eyes: Negative.    Respiratory: Negative.    Cardiovascular: Negative.    Gastrointestinal: Negative.    Endocrine: Negative.    Genitourinary: Negative.    Musculoskeletal: Positive for back pain.   Skin: Negative.    Allergic/Immunologic: Negative.    Neurological: Positive for dizziness, facial asymmetry, weakness and numbness.   Hematological: Negative.    Psychiatric/Behavioral: Negative.         Otherwise complete ROS reviewed and negative except as mentioned in the HPI.      Past Medical History:   Past Medical History:   Diagnosis Date   • Coronary artery disease    • Hyperlipidemia    • Hypertension    • Myocardial infarction (CMS/HCC)    • Stroke (CMS/HCC)        Past Surgical History:  Past Surgical History:   Procedure Laterality Date   • APPENDECTOMY     • CAROTID ENDARTERECTOMY     • CHOLECYSTECTOMY     • COLON SURGERY         Social History:  reports that he quit smoking about 2 months ago. He does not have any smokeless tobacco history on file. He reports that he does not drink alcohol or use drugs.    Family History: family history is not on file.     Allergies:  No Known Allergies    Medications:  Prior to Admission medications    Medication Sig Start Date End Date Taking? Authorizing Provider   amLODIPine (NORVASC) 10 MG tablet 10 mg by Per G Tube route Daily.   Yes ProviderSpring MD   aspirin 81 MG chewable tablet 81 mg by Per G Tube route Daily.   Yes ProviderSpring MD   atorvastatin (LIPITOR) 80 MG tablet 80 mg by Per " "G Tube route Daily.   Yes Spring Chu MD   carvedilol (COREG) 12.5 MG tablet 12.5 mg by Per G Tube route 2 (Two) Times a Day With Meals.   Yes Spring Chu MD   clopidogrel (PLAVIX) 75 MG tablet 75 mg by Per G Tube route Daily.   Yes Spring Chu MD   losartan (COZAAR) 50 MG tablet Take 50 mg by mouth 2 (Two) Times a Day.   Yes Spring Chu MD   azithromycin (ZITHROMAX) 250 MG tablet Take 250 mg by mouth Daily. Take 2 tablets the first day, then 1 tablet daily for 4 days.    Spring Chu MD   benzocaine (CHLORASEPTIC) 15 MG lozenge lozenge Take 1 lozenge by mouth Every 2 (Two) Hours As Needed.    Spring Chu MD   cloNIDine (CATAPRES) 0.1 MG tablet Take 1 tablet by mouth 2 (Two) Times a Day As Needed for High Blood Pressure (Systolic blood pressure over 160). 11/18/19   Corey Enriquez MD   guaiFENesin (MUCINEX) 600 MG 12 hr tablet Take 1,200 mg by mouth 2 (Two) Times a Day.    Spring Chu MD   HYDROcodone-acetaminophen (NORCO) 5-325 MG per tablet Take 1 tablet by mouth Every 6 (Six) Hours As Needed.    Spring Chu MD   NIFEdipine XL (PROCARDIA XL) 30 MG 24 hr tablet Take 30 mg by mouth 2 (Two) Times a Day.    Spring Chu MD       Objective    Vital Signs: /79 (BP Location: Right arm, Patient Position: Lying)   Pulse 63   Temp 98.4 °F (36.9 °C) (Oral)   Resp 20   Ht 170.2 cm (67\")   Wt 81.9 kg (180 lb 8.9 oz)   SpO2 98%   BMI 28.28 kg/m²   Physical Exam   Constitutional: He is oriented to person, place, and time. He appears well-developed and well-nourished.   HENT:   Head: Normocephalic and atraumatic.   Right Ear: External ear normal.   Left Ear: External ear normal.   Nose: Nose normal.   Mouth/Throat: Oropharynx is clear and moist.   Eyes: Pupils are equal, round, and reactive to light. Conjunctivae and EOM are normal.   Neck: Normal range of motion. Neck supple.   Cardiovascular: Normal rate, regular rhythm, " normal heart sounds and intact distal pulses.   Pulmonary/Chest: Effort normal and breath sounds normal.   Abdominal: Soft. Bowel sounds are normal.   Musculoskeletal: Normal range of motion.   Neurological: He is alert and oriented to person, place, and time. He displays abnormal reflex. A cranial nerve deficit and sensory deficit is present. Coordination abnormal.   Skin: Skin is warm and dry. Capillary refill takes less than 2 seconds.   Psychiatric: He has a normal mood and affect. His behavior is normal. Judgment and thought content normal.   Nursing note and vitals reviewed.        Results Reviewed:  Lab Results (last 24 hours)     ** No results found for the last 24 hours. **        Imaging Results (Last 24 Hours)     Procedure Component Value Units Date/Time    MRI Brain Without Contrast [[id number]] Resulted:  01/22/20 0633     Updated:  01/22/20 0633            There are no active hospital problems to display for this patient.      Assessment / Plan  cva---will ask for mri and carotid duplex scan and neurology consultation      Code Status: full code     I discussed the patient's findings and my recommendations with the patinet and his family.    Estimated length of stay 3 days.    Roverto Colon MD   01/22/20   6:38 AM

## 2020-01-22 NOTE — THERAPY DISCHARGE NOTE
Acute Care - Occupational Therapy Initial Eval/Discharge  [facility]     Patient Name: Daniele Delong  : 1950  MRN: [id number]  Today's Date: 2020  Onset of Illness/Injury or Date of Surgery: 20  Date of Referral to OT: 20  Referring Physician: KWABENA Montiel      Admit Date: 2020       ICD-10-CM ICD-9-CM   1. Oropharyngeal dysphagia R13.12 787.22     Patient Active Problem List   Diagnosis   • CVA (cerebral vascular accident) (CMS/HCC)   • Essential hypertension   • Oropharyngeal dysphagia   • Other hyperlipidemia     Past Medical History:   Diagnosis Date   • Coronary artery disease    • Hyperlipidemia    • Hypertension    • Myocardial infarction (CMS/HCC)    • Stroke (CMS/HCC)      Past Surgical History:   Procedure Laterality Date   • APPENDECTOMY     • CAROTID ENDARTERECTOMY     • CHOLECYSTECTOMY     • COLON SURGERY            OT ASSESSMENT FLOWSHEET (last 12 hours)      Occupational Therapy Evaluation     Row Name 20 1101                   OT Evaluation Time/Intention    Subjective Information  complains of;weakness n/t resolved from yeste., L sided wknss since Nov. CVA  -MW        Document Type  evaluation  -MW        Mode of Treatment  occupational therapy  -MW           General Information    Patient Profile Reviewed?  yes  -MW        Onset of Illness/Injury or Date of Surgery  20  -MW        Referring Physician  KWABENA Montiel  -CHAUNCEY        Patient Observations  alert;cooperative;agree to therapy  -MW        Patient/Family Observations  no family present  -MW        General Observations of Patient  awake and alert, no apparent distress  -MW        Prior Level of Function  independent:;all household mobility;community mobility;ADL's;dependent:;driving  -MW        Equipment Currently Used at Home  cane, straight;rollator;walker, rolling has not been using  -MW        Pertinent History of Current Functional Problem  presented w L sided facial numbness and dysarthria; dx: INDERA - R  basal stroke  -MW        Existing Precautions/Restrictions  fall  -MW        Barriers to Rehab  none identified  -MW           Relationship/Environment    Lives With  significant other  -MW        Name(s) of Who Lives With Patient  Nany  -MW           Resource/Environmental Concerns    Current Living Arrangements  home/apartment/condo  -MW           Home Main Entrance    Number of Stairs, Main Entrance  four  -MW        Stair Railings, Main Entrance  railings on both sides of stairs  -MW           Cognitive Assessment/Interventions    Additional Documentation  Cognitive Assessment/Intervention (Group)  -MW           Cognitive Assessment/Intervention- PT/OT    Orientation Status (Cognition)  oriented x 4  -MW        Personal Safety Interventions  fall prevention program maintained;gait belt;muscle strengthening facilitated;nonskid shoes/slippers when out of bed;supervised activity  -MW           Bed Mobility Assessment/Treatment    Bed Mobility Assessment/Treatment  bed mobility (all) activities  -MW        [place] Level (Bed Mobility)  independent  -MW           Functional Mobility    Functional Mobility- Ind. Level  contact guard assist;supervision required  -MW        Functional Mobility- Comment  amb to end of main, no balance impairment obs  -MW           Transfer Assessment/Treatment    Transfer Assessment/Treatment  sit-stand transfer  -MW           Sit-Stand Transfer    Sit-Stand [place] (Transfers)  supervision  -MW           ADL Assessment/Intervention    BADL Assessment/Intervention  lower body dressing  -MW           Lower Body Dressing Assessment/Training    Lower Body Dressing [place] Level  don;socks;independent  -MW        Lower Body Dressing Position  sitting up in bed  -MW           General ROM    GENERAL ROM COMMENTS  AROM WFL BUE  -MW           MMT (Manual Muscle Testing)    General MMT Comments  BUE 5/5  -MW           Motor Assessment/Interventions    Additional Documentation   Balance (Group);Fine Motor Testing & Training (Group);Gross Motor Coordination (Group)  -MW           Gross Motor Coordination    Gross Motor Skill, Impairments Detail  COURT, FTN intact B  -MW           Balance    Balance  static sitting balance;static standing balance;dynamic sitting balance;dynamic standing balance  -MW           Static Sitting Balance    Level of [place] (Unsupported Sitting, Static Balance)  independent  -MW        Sitting Position (Unsupported Sitting, Static Balance)  sitting on edge of bed  -MW           Dynamic Sitting Balance    Level of [place], Reaches Outside Midline (Sitting, Dynamic Balance)  supervision  -MW        Sitting Position, Reaches Outside Midline (Sitting, Dynamic Balance)  sitting on edge of bed  -MW           Static Standing Balance    Level of [place] (Supported Standing, Static Balance)  standby assist  -MW        Comment (Supported Standing, Static Balance)  stood with feet together, mild postural sway obs with eyes closed, no LOB  -MW           Dynamic Standing Balance    Level of [place], Reaches Outside Midline (Standing, Dynamic Balance)  contact guard assist  -MW        Comment, Reaches Outside Midline (Standing, Dynamic Balance)  reaches in all planes with no balance impairment  -MW           Fine Motor Testing & Training    Comment, Fine Motor Coordination  opposition w mild inaccuracy but functional and pts baseline  -MW           Sensory Assessment/Intervention    Sensory General Assessment  no sensation deficits identified  -MW           Positioning and Restraints    Pre-Treatment Position  in bed  -MW        Post Treatment Position  bed  -MW        In Bed  fowlers;call light within reach;encouraged to call for assist;with family/caregiver;side rails up x2;SCD pump applied  -MW           Pain Assessment    Additional Documentation  Pain Scale: Numbers Pre/Post-Treatment (Group)  -MW           Pain Scale: Numbers Pre/Post-Treatment    Pain  Scale: Numbers, Pretreatment  0/10 - no pain  -MW        Pain Scale: Numbers, Post-Treatment  0/10 - no pain  -MW           Plan of Care Review    Plan of Care Reviewed With  patient;significant other  -MW        Progress  improving  -MW        Outcome Summary  OT eval completed. Pt demos no impairment or exacerbation of symptoms with all ADL and mobility assessment. Mild coordination deficit in B UE, but fully functional and is pt baseline. Educated regarding home safety and fall risk. No OT indicated at this time as pt is at his baseline. Anticipate d/c home w assist.  -MW           Clinical Impression (OT)    Date of Referral to OT  01/22/20  -MW        Patient/Family Goals Statement (OT Eval)  return home  -MW        Criteria for Skilled Therapeutic Interventions Met (OT Eval)  no;no problems identified which require skilled intervention  -MW        Therapy Frequency (OT Eval)  evaluation only  -MW        Care Plan Review (OT)  evaluation/treatment results reviewed;care plan/treatment goals reviewed;risks/benefits reviewed;current/potential barriers reviewed;patient/other agree to care plan  -MW        Anticipated Discharge Disposition (OT)  home with assist  -MW           Living Environment    Home Accessibility  stairs to enter home;tub/shower is not walk in walk in shower available w threshold/grab bars/glass doors  -MW          User Key  (r) = Recorded By, (t) = Taken By, (c) = Cosigned By    Initials Name Effective Dates    Debra Mckeon, OTR/L 08/28/18 -           Occupational Therapy Education                 Title: PT OT SLP Therapies (Done)     Topic: Occupational Therapy (Done)     Point: ADL training (Done)     Description:   Instruct learner(s) on proper safety adaptation and remediation techniques during self care or transfers.   Instruct in proper use of assistive devices.              Learning Progress Summary           Patient Acceptance, E, VU by CHAUNCEY at 1/22/2020 1202    Comment:  ADL,  safety awarness for home, OT POC   Significant Other Acceptance, E, VU by CHAUNCEY at 1/22/2020 1202    Comment:  ADL, safety awarness for home, OT POC                               User Key     Initials Effective Dates Name Provider Type Discipline     08/28/18 -  Debra Hairston, OTR/L Occupational Therapist OT                OT Recommendation and Plan  Outcome Summary/Treatment Plan (OT)  Anticipated Discharge Disposition (OT): home with assist  Therapy Frequency (OT Eval): evaluation only  Plan of Care Review  Plan of Care Reviewed With: patient, significant other  Plan of Care Reviewed With: patient, significant other  Outcome Summary: OT eval completed. Pt demos no impairment or exacerbation of symptoms with all ADL and mobility assessment. Mild coordination deficit in B UE, but fully functional and is pt baseline. Educated regarding home safety and fall risk. No OT indicated at this time as pt is at his baseline. Anticipate d/c home w assist.     Rehab Goal Summary     Row Name 01/22/20 0818             Swallow Goals (SLP)    Oral Nutrition/Hydration Goal Selection (SLP)  oral nutrition/hydration, SLP goal 1  -BN         Oral Nutrition/Hydration Goal 1 (SLP)    Oral Nutrition/Hydration Goal 1, SLP  Pt will tolerate LRD without s/s of aspiration  -BN      Time Frame (Oral Nutrition/Hydration Goal 1, SLP)  short term goal (STG);by discharge  -BN      Barriers (Oral Nutrition/Hydration Goal 1, SLP)  n/a  -BN      Progress/Outcomes (Oral Nutrition/Hydration Goal 1, SLP)  goal ongoing  -BN        User Key  (r) = Recorded By, (t) = Taken By, (c) = Cosigned By    Initials Name Provider Type Discipline    Estelita Nj CCC-SLP Speech and Language Pathologist SLP          Outcome Measures     Row Name 01/22/20 1200             How much help from another is currently needed...    Putting on and taking off regular lower body clothing?  4  -MW      Bathing (including washing, rinsing, and drying)  4  -MW       Toileting (which includes using toilet bed pan or urinal)  4  -MW      Putting on and taking off regular upper body clothing  4  -MW      Taking care of personal grooming (such as brushing teeth)  4  -MW      Eating meals  4  -MW      AM-PAC 6 Clicks Score (OT)  24  -MW         Functional Assessment    Outcome Measure Options  AM-PAC 6 Clicks Daily Activity (OT)  -MW        User Key  (r) = Recorded By, (t) = Taken By, (c) = Cosigned By    Initials Name Provider Type    Debra Mckeon OTR/L Occupational Therapist          Time Calculation:   Time Calculation- OT     Row Name 01/22/20 1204             Time Calculation- OT    OT Start Time  1058  -MW      OT Stop Time  1156  -MW      OT Time Calculation (min)  58 min  -MW      OT Received On  01/22/20  -MW        User Key  (r) = Recorded By, (t) = Taken By, (c) = Cosigned By    Initials Name Provider Type    Debra Mckeon OTR/L Occupational Therapist        Therapy Suggested Charges     Code   Minutes Charges    None           Therapy Charges for Today     Code Description Service Date Service Provider Modifiers Qty    [id number]  OT EVAL LOW COMPLEXITY 4 1/22/2020 Debra Hairston OTR/L GO 1               OT Discharge Summary  Anticipated Discharge Disposition (OT): home with assist  Reason for Discharge: At baseline function  Outcomes Achieved: Other(eval only)  Discharge Destination: Home with assist    KENNETH Nelson/PATRICE  1/22/2020

## 2020-01-22 NOTE — PLAN OF CARE
Problem: Patient Care Overview  Goal: Plan of Care Review  Outcome: Ongoing (interventions implemented as appropriate)  Flowsheets (Taken 1/22/2020 1252)  Plan of Care Reviewed With: patient  Outcome Summary: Modified barium swallow study completed.Full range of consistencies presented in the following order: honey, nectar, thin, pudding, mechanical soft, regular solid, thin x2. He exhibited decreased bolus formation with honey and nectar. He had lingual pumping/rocking with nectar and thin consistencies. He displayed premature loss to the vallecula with nectar and thin secondary to both decreased back of tongue control and a delay and with pudding, mechanical soft, and regular solids secondary to a delay only. He had decreased rotary chew with mechanical soft and regular solids. He had flash penetration with each trial of thin barium. No definite aspiration was observed throughout the study. Recommend regular solids and thin liquids. Meds whole with applesauce. SLP will follow to monitor diet tolerance.

## 2020-01-22 NOTE — MBS/VFSS/FEES
Acute Care - Speech Language Pathology   Swallow Initial Evaluation [facility]     Patient Name: Daniele Delong  : 1950  MRN: [id number]  Today's Date: 2020  Onset of Illness/Injury or Date of Surgery: 20     Referring Physician: KWABENA Montiel      Admit Date: 2020  Modified barium swallow study completed.Full range of consistencies presented in the following order: honey, nectar, thin, pudding, mechanical soft, regular solid, thin x2. He exhibited decreased bolus formation with honey and nectar. He had lingual pumping/rocking with nectar and thin consistencies. He displayed premature loss to the vallecula with nectar and thin secondary to both decreased back of tongue control and a delay and with pudding, mechanical soft, and regular solids secondary to a delay only. He had decreased rotary chew with mechanical soft and regular solids. He had flash penetration with each trial of thin barium. No definite aspiration was observed throughout the study.     Recommend:  1. Regular solids and thin liquids.   2. Meds whole with applesauce.   3. SLP will follow to monitor diet tolerance.  Swapnil Chaudhary CCC-SLP 2020 12:55 PM    Visit Dx:     ICD-10-CM ICD-9-CM   1. Oropharyngeal dysphagia R13.12 787.22   2. Gait abnormality R26.9 781.2     Patient Active Problem List   Diagnosis   • CVA (cerebral vascular accident) (CMS/HCC)   • Essential hypertension   • Oropharyngeal dysphagia   • Other hyperlipidemia     Past Medical History:   Diagnosis Date   • Coronary artery disease    • Hyperlipidemia    • Hypertension    • Myocardial infarction (CMS/HCC)    • Stroke (CMS/HCC)      Past Surgical History:   Procedure Laterality Date   • APPENDECTOMY     • CAROTID ENDARTERECTOMY     • CHOLECYSTECTOMY     • COLON SURGERY          SWALLOW EVALUATION (last 72 hours)      SLP Adult Swallow Evaluation     Row Name 20 0958 20 0818                Rehab Evaluation    Document Type  evaluation  -MB   evaluation  -BN       Subjective Information  no complaints  -MB  no complaints  -BN       Patient Observations  alert;cooperative  -MB  alert;cooperative  -BN       Patient/Family Observations  --  no family present  -BN       Patient Effort  --  good  -BN          General Information    Patient Profile Reviewed  yes  -MB  yes  -BN       Pertinent History Of Current Problem  acute CVA. Hx CVA in November 2019 with left facial droop and vocal cord paresis. G-tube placed d/t aspiration. Hx of MI, CAD, HTN  -MB  acute CVA. Hx CVA in November 2019 with left facial droop and vocal cord paresis. G-tube placed d/t aspiration. Hx of MI, CAD, HTN  -BN       Current Method of Nutrition  NPO  -MB  NPO  -BN       Precautions/Limitations, Vision  WFL;for purposes of eval  -MB  WFL;for purposes of eval  -BN       Precautions/Limitations, Hearing  WFL  -MB  WFL  -BN       Prior Level of Function-Communication  WFL  -MB  WFL  -BN       Prior Level of Function-Swallowing  regular textures;nectar thick liquids  -MB  nectar thick liquids;regular textures  -BN       Plans/Goals Discussed with  patient  -MB  patient  -BN       Barriers to Rehab  none identified  -MB  none identified  -BN       Patient's Goals for Discharge  patient did not state  -MB  return to PO diet  -BN          Pain Assessment    Additional Documentation  Pain Scale: FACES Pre/Post-Treatment (Group)  -MB  Pain Scale: FACES Pre/Post-Treatment (Group)  -BN          Pain Scale: FACES Pre/Post-Treatment    Pain: FACES Scale, Pretreatment  0-->no hurt  -MB  0-->no hurt  -BN       Pain: FACES Scale, Post-Treatment  --  0-->no hurt  -BN          Oral Motor and Function    Dentition Assessment  upper dentures/partial in place;natural, present and adequate  -MB  upper dentures/partial in place;natural, present and adequate  -BN       Secretion Management  WNL/WFL  -MB  WNL/WFL  -BN       Mucosal Quality  moist, healthy  -MB  moist, healthy  -BN       Volitional Swallow  --   delayed  -BN          Oral Musculature and Cranial Nerve Assessment    Oral Motor General Assessment  oral labial or buccal impairment  -MB  oral labial or buccal impairment  -BN       Oral Labial or Buccal Impairment, Detail, Cranial Nerve VII (Facial):  CN7: Motor Impairment;left labial droop  -MB  left labial droop residual from previous CVA 11/2019  -BN          General Eating/Swallowing Observations    Respiratory Support Currently in Use  room air  -MB  room air  -BN       Eating/Swallowing Skills  --  self-fed;fed by staff/caregiver  -BN       Positioning During Eating  --  upright in bed  -BN       Utensils Used  --  spoon;straw  -BN       Consistencies Trialed  --  regular textures;pudding thick;honey-thick liquids;nectar/syrup-thick liquids;thin liquids  -BN          Clinical Swallow Eval    Oral Prep Phase  --  impaired  -BN       Oral Transit  --  impaired  -BN       Oral Residue  --  WFL  -BN       Pharyngeal Phase  --  suspected pharyngeal impairment  -BN       Esophageal Phase  --  unremarkable  -BN       Clinical Swallow Evaluation Summary  --   clinical bedside swallow evaluation completed d/t failed swallow screen. 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 Pt reports getting Botox injections on back of tongue; however, it is suspected injections were likely for vocal cord paralysis. Pt stated he receieved outpt tx for swallowing with e-stim and beginning PO diet. Pt stated he no longer utilizes G-tube for nutrition but does still place meds in g-tube and continues to flush tube to keep clean. Pt stated he stopped using feeding tube for nutrition approximately 1 week ago. Pt reports eating PO diet with thickened liquids that are slightly thick in consistency. Pt presented with full range of consistencies at bedside except mech soft. Pt demo with weak laryngeal elevation with all trials. Slight delay in swallow  initiation with puree and honey. Questionable delayed wet vocal quality with honey thick liquids 2x. Mild oral holding when utilizing straw with nectar and thin. Adequate mastication and oral clearing with regular solids. ST feels pt would benefit from current objective study to determine most appropriate PO diet at this time d/t hx of aspiration and use of g-tube. MBS ordered for this date. Pt okay for small sips of water prior to study, otherwise remain NPO. ST to follow and treat.   -BN          Oral Prep Concerns    Oral Prep Concerns  --  oral holding  -BN       Oral Holding  --  thin;nectar  -BN          Oral Transit Concerns    Oral Transit Concerns  --  delayed initiation of bolus transit  -BN       Delayed Intiation of Bolus Transit  --  pudding;honey  -BN          Pharyngeal Phase Concerns    Pharyngeal Phase Concerns  --  wet vocal quality  -BN       Wet Vocal Quality  --  honey  -BN          MBS/VFSS    Utensils Used  spoon;straw  -MB  --       Consistencies Trialed  regular textures;soft textures;thin liquids;nectar/syrup-thick liquids;honey-thick liquids;pudding thick  -MB  --          MBS/VFSS Interpretation    Oral Prep Phase  impaired oral phase of swallowing  -MB  --       Oral Transit Phase  impaired  -MB  --       Oral Residue  WFL  -MB  --       VFSS Summary  Full range of consistencies presented in the following order: honey, nectar, thin, pudding, mechanical soft, regular solid, thin x2. He exhibited decreased bolus formation with honey and nectar. He had lingual pumping/rocking with nectar and thin consistencies. He displayed premature loss to the vallecula with nectar and thin secondary to both decreased back of tongue control and a delay and with pudding, mechanical soft, and regular solids secondary to a delay only. He had decreased rotary chew with mechanical soft and regular solids. He had flash penetration with each trial of thin barium. No definite aspiration was observed throughout  the study.   -MB  --          Oral Preparatory Phase    Oral Preparatory Phase  inadequate manipulation;poor rotary chew  -MB  --       Inadequate Manipulation  honey-thick liquids;nectar-thick liquids  -MB  --       Poor Rotary Chew  mechanical soft;regular textures  -MB  --          Oral Transit Phase    Impaired Oral Transit Phase  tongue pumping  -MB  --       Tongue Pumping  thin liquids;nectar-thick liquids  -MB  --          Initiation of Pharyngeal Swallow    Initiation of Pharyngeal Swallow  bolus in valleculae  -MB  --       Pharyngeal Phase  impaired pharyngeal phase of swallowing  -MB  --       Penetration During the Swallow  thin liquids;other (see comments) flash  -MB  --          Clinical Impression    SLP Swallowing Diagnosis  functional oral phase;functional pharyngeal phase  -MB  suspected pharyngeal dysfunction  -BN       Functional Impact  no impact on function  -MB  risk of aspiration/pneumonia  -BN       Rehab Potential/Prognosis, Swallowing  good, to achieve stated therapy goals  -MB  good, to achieve stated therapy goals  -BN       Swallow Criteria for Skilled Therapeutic Interventions Met  demonstrates skilled criteria  -MB  demonstrates skilled criteria  -BN          Recommendations    Therapy Frequency (Swallow)  PRN  -MB  at least;3 days per week  -BN       Predicted Duration Therapy Intervention (Days)  until discharge  -MB  until discharge  -BN       SLP Diet Recommendation  regular textures;thin liquids  -MB  NPO  -BN       Recommended Diagnostics  --  VFSS (MBS)  -BN       Recommended Precautions and Strategies  upright posture during/after eating;small bites of food and sips of liquid;alternate between small bites of food and sips of liquid  -MB  upright posture during/after eating;small bites of food and sips of liquid  -BN       SLP Rec. for Method of Medication Administration  meds whole;with pudding or applesauce  -MB  meds whole;with pudding or applesauce  -BN       Monitor for  Signs of Aspiration  yes;cough;gurgly voice;throat clearing;pneumonia;notify SLP if any concerns  -MB  yes;notify SLP if any concerns;cough;gurgly voice;throat clearing;pneumonia;right lower lobe infiltrates  -BN       Anticipated Dischage Disposition  home with OP services  -MB  home with OP services  -BN          Swallow Goals (SLP)    Oral Nutrition/Hydration Goal Selection (SLP)  oral nutrition/hydration, SLP goal 1  -MB  oral nutrition/hydration, SLP goal 1  -BN          Oral Nutrition/Hydration Goal 1 (SLP)    Oral Nutrition/Hydration Goal 1, SLP  Pt will tolerate LRD without s/s of aspiration  -MB  Pt will tolerate LRD without s/s of aspiration  -BN       Time Frame (Oral Nutrition/Hydration Goal 1, SLP)  short term goal (STG);by discharge  -MB  short term goal (STG);by discharge  -BN       Barriers (Oral Nutrition/Hydration Goal 1, SLP)  n/a  -MB  n/a  -BN       Progress/Outcomes (Oral Nutrition/Hydration Goal 1, SLP)  goal ongoing  -MB  goal ongoing  -BN         User Key  (r) = Recorded By, (t) = Taken By, (c) = Cosigned By    Initials Name Effective Dates    Swapnil Duong, CCC-SLP 08/02/16 -     Estelita Nj, CCC-SLP 04/03/18 -           EDUCATION  The patient has been educated in the following areas:   Dysphagia (Swallowing Impairment).    SLP Recommendation and Plan  SLP Swallowing Diagnosis: functional oral phase, functional pharyngeal phase  SLP Diet Recommendation: regular textures, thin liquids  Recommended Precautions and Strategies: upright posture during/after eating, small bites of food and sips of liquid, alternate between small bites of food and sips of liquid  SLP Rec. for Method of Medication Administration: meds whole, with pudding or applesauce     Monitor for Signs of Aspiration: yes, cough, gurgly voice, throat clearing, pneumonia, notify SLP if any concerns     Swallow Criteria for Skilled Therapeutic Interventions Met: demonstrates skilled criteria  Anticipated  Dischage Disposition: home with OP services  Rehab Potential/Prognosis, Swallowing: good, to achieve stated therapy goals  Therapy Frequency (Swallow): PRN  Predicted Duration Therapy Intervention (Days): until discharge       Plan of Care Reviewed With: patient  Outcome Summary: Modified barium swallow study completed.Full range of consistencies presented in the following order: honey, nectar, thin, pudding, mechanical soft, regular solid, thin x2. He exhibited decreased bolus formation with honey and nectar. He had lingual pumping/rocking with nectar and thin consistencies. He displayed premature loss to the vallecula with nectar and thin secondary to both decreased back of tongue control and a delay and with pudding, mechanical soft, and regular solids secondary to a delay only. He had decreased rotary chew with mechanical soft and regular solids. He had flash penetration with each trial of thin barium. No definite aspiration was observed throughout the study. Recommend regular solids and thin liquids. Meds whole with applesauce. SLP will follow to monitor diet tolerance.    SLP GOALS     Row Name 01/22/20 0958 01/22/20 0818          Oral Nutrition/Hydration Goal 1 (SLP)    Oral Nutrition/Hydration Goal 1, SLP  Pt will tolerate LRD without s/s of aspiration  -MB  Pt will tolerate LRD without s/s of aspiration  -BN     Time Frame (Oral Nutrition/Hydration Goal 1, SLP)  short term goal (STG);by discharge  -MB  short term goal (STG);by discharge  -BN     Barriers (Oral Nutrition/Hydration Goal 1, SLP)  n/a  -MB  n/a  -BN     Progress/Outcomes (Oral Nutrition/Hydration Goal 1, SLP)  goal ongoing  -MB  goal ongoing  -BN       User Key  (r) = Recorded By, (t) = Taken By, (c) = Cosigned By    Initials Name Provider Type    Swapnil Duong, CCC-SLP Speech and Language Pathologist    Estelita Nj CCC-SLP Speech and Language Pathologist             Time Calculation:   Time Calculation- SLP     Row Name  01/22/20 1255 01/22/20 0919          Time Calculation- SLP    SLP Start Time  0958  -MB  0818  -     SLP Stop Time  1040  -MB  0919  -     SLP Time Calculation (min)  42 min  -MB  61 min  -     SLP Received On  01/22/20  -MB  01/22/20  -     SLP Goal Re-Cert Due Date  02/01/20  -MB  02/01/20  -       User Key  (r) = Recorded By, (t) = Taken By, (c) = Cosigned By    Initials Name Provider Type    Swapnil Duong CCC-SLP Speech and Language Pathologist    Estelita Nj CCC-SLP Speech and Language Pathologist          Therapy Charges for Today     Code Description Service Date Service Provider Modifiers Qty    [id number] HC ST MOTION FLUORO EVAL SWALLOW 3 1/22/2020 Swapnil Chaudhary CCC-SLP GN 1               Swapnil Chaudhary CCC-JESUS  1/22/2020

## 2020-01-22 NOTE — PLAN OF CARE
Problem: Patient Care Overview  Goal: Plan of Care Review  Outcome: Ongoing (interventions implemented as appropriate)  Flowsheets (Taken 1/22/2020 1101)  Progress: improving  Plan of Care Reviewed With: patient;significant other  Outcome Summary: OT eval completed. Pt demos no impairment or exacerbation of symptoms with all ADL and mobility assessment. Mild coordination deficit in B UE, but fully functional and is pt baseline. Educated regarding home safety and fall risk. No OT indicated at this time as pt is at his baseline. Anticipate d/c home w assist.

## 2020-01-22 NOTE — CONSULTS
Neurology Consult Note    Referring Provider: Roverto Colon MD  Reason for Consultation: stroke      History of present illness:    Patient history is obtained by patient but he his a poor historian. History also obtained by medical records. There is no family at bedside. He is a retired . Patient has history of tobacco use stopping in 11/2019, hypertension, cancer of appendix 2015, hyperlipidemia. In 2015 he also had cholecystectomy.  Patient has had a complicated course since 11/2019. He presented to [facility] 11/14/19 with hypertensive crisis. He was also treated for pneumonia. He was in intensive care requiring Cardene drip and then transitioned to oral antihypertensive. He was discharged home on 11/17/19. The same day he presented to [facility] ED for worsening cough and congestion. He was treated and discharged to home. Later that month, he presented to [facility] with left facial numbness and dysarthria. He was transferred to [facility] in [place]. He had dysphagia and a PEG was placed. He is not able to tell me much from that admission. He was told he had a stroke. He was discharged to home. He had been undergoing ST and as of today, PEG is in place but not being used and has been taking foods/liquids orally. He then presented 11/30/19  to [facility] in [place] for worsening dysphagia. He was discharged home 12/1/29.  He tells me he did have some remaining left lower facial weakness and dysarthria that comes and goes.     Yesterday, patient was fixing himself a meal. He had placed items on the kitchen island. When he turned around, he walked completely around the island and he is unsure why he did this. He also noted numbness left side of his lips and worsening dysarthria. He then presented to [facility] and transferred to [facility] for further evaluation.    CT head done at [facility] [place] showed prior right basal ganglia stroke. Patient does take Plavix 75 mg daily and denies missing  doses. He also takes Lipitor 80 mg daily.     MRI brain w/o did show prior right basal ganglia stroke and radiology report is pending. MRI brain with contrast is also pending.   Carotid duplex scan preliminary report shows less than 50% stenosis bilateral ICA with bilateral antegrade vertebral flow.       Past Medical History:   Diagnosis Date   • Coronary artery disease    • Hyperlipidemia    • Hypertension    • Myocardial infarction (CMS/HCC)    • Stroke (CMS/HCC)        No Known Allergies  No current facility-administered medications on file prior to encounter.      Current Outpatient Medications on File Prior to Encounter   Medication Sig   • amLODIPine (NORVASC) 10 MG tablet 10 mg by Per G Tube route Daily.   • aspirin 81 MG chewable tablet 81 mg by Per G Tube route Daily.   • atorvastatin (LIPITOR) 80 MG tablet 80 mg by Per G Tube route Daily.   • carvedilol (COREG) 12.5 MG tablet 12.5 mg by Per G Tube route 2 (Two) Times a Day With Meals.   • clopidogrel (PLAVIX) 75 MG tablet 75 mg by Per G Tube route Daily.   • losartan (COZAAR) 50 MG tablet Take 50 mg by mouth 2 (Two) Times a Day.   • azithromycin (ZITHROMAX) 250 MG tablet Take 250 mg by mouth Daily. Take 2 tablets the first day, then 1 tablet daily for 4 days.   • benzocaine (CHLORASEPTIC) 15 MG lozenge lozenge Take 1 lozenge by mouth Every 2 (Two) Hours As Needed.   • cloNIDine (CATAPRES) 0.1 MG tablet Take 1 tablet by mouth 2 (Two) Times a Day As Needed for High Blood Pressure (Systolic blood pressure over 160).   • guaiFENesin (MUCINEX) 600 MG 12 hr tablet Take 1,200 mg by mouth 2 (Two) Times a Day.   • HYDROcodone-acetaminophen (NORCO) 5-325 MG per tablet Take 1 tablet by mouth Every 6 (Six) Hours As Needed.   • NIFEdipine XL (PROCARDIA XL) 30 MG 24 hr tablet Take 30 mg by mouth 2 (Two) Times a Day.       Current Facility-Administered Medications:   •  amLODIPine (NORVASC) tablet 10 mg, 10 mg, Per G Tube, Q24H, Roverto Colon MD  •  aspirin  chewable tablet 81 mg, 81 mg, Per G Tube, Daily, Roverto Colon MD  •  atorvastatin (LIPITOR) tablet 80 mg, 80 mg, Oral, Nightly, Roverto Colon MD  •  carvedilol (COREG) tablet 12.5 mg, 12.5 mg, Per G Tube, Q12H, Roverto Colon MD  •  clopidogrel (PLAVIX) tablet 75 mg, 75 mg, Oral, Daily, Roverto Colon MD  •  losartan (COZAAR) tablet 100 mg, 100 mg, Per G Tube, Q24H, Roverto Colon MD  •  sodium chloride 0.9 % flush 10 mL, 10 mL, Intravenous, Q12H, Roverto Colon MD  •  sodium chloride 0.9 % flush 10 mL, 10 mL, Intravenous, PRN, Roverto Colon MD  •  sodium chloride 0.9 % infusion, 30 mL/hr, Intravenous, Continuous, Roverto Colon MD, Last Rate: 30 mL/hr at 20, 30 mL/hr at 20 0533  Social History     Socioeconomic History   • Marital status: Single     Spouse name: Not on file   • Number of children: Not on file   • Years of education: Not on file   • Highest education level: Not on file   Tobacco Use   • Smoking status: Former Smoker     Last attempt to quit: 10/27/2019     Years since quittin.2   Substance and Sexual Activity   • Alcohol use: No     Frequency: Never   • Drug use: No     History reviewed. No pertinent family history.    Review of Systems  A 14 point review of systems was reviewed and was negative except for left facial weakness.     Vital Signs   Temp:  [98.3 °F (36.8 °C)-98.4 °F (36.9 °C)] 98.3 °F (36.8 °C)  Heart Rate:  [61-63] 61  Resp:  [18-20] 18  BP: (154-158)/(79) 154/79      Telemetry: S 61-68    General Exam:  Head:  Normal cephalic, atraumatic  HEENT:  Neck supple  Fundoscopic Exam:  No signs of disc edema  CVS:  Regular rate and rhythm.  No murmurs  Carotid Examination:  No bruits  Lungs:  Clear to auscultation  Abdomen:  Non-tender, Non-distended  Extremities:  No signs of peripheral edema  Skin:  No rashes    Neurologic Exam:    Mental Status:    -Awake, Alert, Oriented X 3  -No word finding  difficulties  -No aphasia  - mild dysarthria  -Follows simple and complex commands    CN II:  Visual fields full.  Pupils equally reactive to light  CN III, IV, VI:  Extraocular Muscles full with no signs of nystagmus  CN V:  Facial sensory is symmetric with no asymmetries.  CN VII:  Facial motor asymmetric with left lower facial droop  CN VIII:  Gross hearing intact bilaterally  CN IX:  Palate elevates symmetrically  CN X:  Palate elevates symmetrically  CN XI:  Shoulder shrug symmetric  CN XII:  Tongue is midline on protrusion    Motor: (strength out of 5:  1= minimal movement, 2 = movement in plane of gravity, 3 = movement against gravity, 4 = movement against some resistance, 5 = full strength)    -Right Upper Ext: Proximal: 5 Distal: 5  -Left Upper Ext: Proximal: 5 Distal: 5    -Right Lower Ext: Proximal: 5 Distal: 5  -Left Lower Ext: Proximal: 5 Distal: 5    DTR:  -Right   Bicep: 2+ Tricep: 2+ Brachoradialis: 2+   Patella: 2+ Ankle: 2+ Neg Babinski  -Left   Bicep: 2+ Tricep: 2+ Brachoradialis: 2+   Patella: 2+ Ankle: 2+ Neg Babinski    Sensory:  -Intact to light touch, pinprick, temperature, pain, and proprioception    Coordination/gait:  -Finger to nose intact  -Heel to shin with subtle ataxia left heel to shin, right is intact  -No ataxia        Results Review:  Lab Results (last 24 hours)     Procedure Component Value Units Date/Time    Comprehensive Metabolic Panel [[id number]]  (Abnormal) Collected:  01/22/20 0726    Specimen:  Blood Updated:  01/22/20 0801     Glucose 108 mg/dL      BUN 21 mg/dL      Creatinine 0.73 mg/dL      Sodium 143 mmol/L      Potassium 4.0 mmol/L      Chloride 105 mmol/L      CO2 29.0 mmol/L      Calcium 9.2 mg/dL      Total Protein 6.9 g/dL      Albumin 4.20 g/dL      ALT (SGPT) 13 U/L      AST (SGOT) 13 U/L      Alkaline Phosphatase 160 U/L      Total Bilirubin 0.5 mg/dL      eGFR Non African Amer 106 mL/min/1.73      Globulin 2.7 gm/dL      A/G Ratio 1.6 g/dL      BUN/Creatinine  Ratio 28.8     Anion Gap 9.0 mmol/L     Narrative:       GFR Normal >60  Chronic Kidney Disease <60  Kidney Failure <15      CBC (No Diff) [[id number]]  (Abnormal) Collected:  01/22/20 0726    Specimen:  Blood Updated:  01/22/20 0743     WBC 7.13 10*3/mm3      RBC 4.77 10*6/mm3      Hemoglobin 12.8 g/dL      Hematocrit 38.7 %      MCV 81.1 fL      MCH 26.8 pg      MCHC 33.1 g/dL      RDW 13.8 %      RDW-SD 40.5 fl      MPV 9.8 fL      Platelets 235 10*3/mm3           .  Imaging Results (Last 24 Hours)     Procedure Component Value Units Date/Time    FL Video Swallow With Speech Single Contrast [[id number]] Resulted:  01/22/20 1001     Updated:  01/22/20 1007    US Carotid Bilateral [[id number]] Resulted:  01/22/20 0701     Updated:  01/22/20 0718    MRI Brain Without Contrast [[id number]] Resulted:  01/22/20 0633     Updated:  01/22/20 0706              MRI brain w/o contrast personally reviewed by me and shows prior right basal stroke and possible mild expansion.    Active Hospital Problems    Diagnosis POA   • CVA (cerebral vascular accident) (CMS/HCC) [I63.9] Yes   • Essential hypertension [I10] Unknown   • Oropharyngeal dysphagia [R13.12] Unknown   • Other hyperlipidemia [E78.49] Unknown       Impression  1. Transient Left facial numbness and left lower facial droop with recent right basal ganglia stroke 11/2019.  Symptoms may be an extension of prior stroke. Does not account for mild ataxia on right lower extremity. Will wait for MRI brain with and without contrast.  Currently prescribed Plavix 75 mg daily. ASA 81 mg has been added.   2. Dysphagia with PEG. ST had recommend video swallow study.  3. Hypertension. Recorded /79  4. Hyperlipidemia. Home medication includes Lipitor 80 mg daily.       Plan  1. Await MRI brain with and without to be completed  2. Check transthoracic echo  3. Check TSH, lipid panel and A1C  4. Consult PT and OT  5. Continue dual antiplatelet therapy of ASA 81 mg and Plavix 75 mg.  6.  Continue Lipitor 80 mg daily.  7. SCD  8. More recommendations after patient seen and examined by Dr. DONY Torres.      I discussed the patients findings and my recommendations with patient and nursing staff    Linda Montiel, KWABENA  01/22/20  10:11 AM

## 2020-01-22 NOTE — THERAPY EVALUATION
Patient Name: Daniele Delong  : 1950    MRN: [id number]                              Today's Date: 2020       Admit Date: 2020    Visit Dx:     ICD-10-CM ICD-9-CM   1. Oropharyngeal dysphagia R13.12 787.22   2. Gait abnormality R26.9 781.2     Patient Active Problem List   Diagnosis   • CVA (cerebral vascular accident) (CMS/HCC)   • Essential hypertension   • Oropharyngeal dysphagia   • Other hyperlipidemia     Past Medical History:   Diagnosis Date   • Coronary artery disease    • Hyperlipidemia    • Hypertension    • Myocardial infarction (CMS/HCC)    • Stroke (CMS/HCC)      Past Surgical History:   Procedure Laterality Date   • APPENDECTOMY     • CAROTID ENDARTERECTOMY     • CHOLECYSTECTOMY     • COLON SURGERY       General Information     Row Name 20 1058          PT Evaluation Time/Intention    Document Type  evaluation;other (see comments) see MAR; pt admitted due to L facial weakness, also w/ HTN and hyperlipidemia;  pt w/ transient L facial numbness, L facial droop; per APRN, pt w/ R basal stroke w/ possible expansion; pt had a stroke 2019  w/ dysphagia requiring PEG tube  -ISIS     Mode of Treatment  physical therapy  -ISIS     Row Name 20 1058          General Information    Patient Profile Reviewed?  yes  -ISIS     Prior Level of Function  dependent:;driving;independent:;all household mobility;community mobility;ADL's;cooking;using stairs  -ISIS     Existing Precautions/Restrictions  fall  -ISIS     Barriers to Rehab  none identified  -ISIS     Row Name 20 1058          Relationship/Environment    Lives With  significant other  -ISIS     Name(s) of Who Lives With Patient  Nany  -ISIS     Row Name 20 1058          Resource/Environmental Concerns    Current Living Arrangements  home/apartment/condo walk in w/ lip to cross to enter w/ grab bars; has tub shower combo also  -ISIS     Row Name 20 1058          Home Main Entrance    Number of Stairs, Main Entrance  four  -JE      Stair Railings, Main Entrance  railings on both sides of stairs  -     Row Name 01/22/20 1058          Stairs Within Home, Primary    Number of Stairs, Within Home, Primary  none  -     Row Name 01/22/20 1058          Cognitive Assessment/Intervention- PT/OT    Orientation Status (Cognition)  oriented x 4  -     Personal Safety Interventions  fall prevention program maintained;supervised activity;gait belt;nonskid shoes/slippers when out of bed  -     Row Name 01/22/20 1058          Safety Issues, Functional Mobility    Impairments Affecting Function (Mobility)  coordination  -     Comment, Safety Issues/Impairments (Mobility)  mild coordination deficit in L LE  -       User Key  (r) = Recorded By, (t) = Taken By, (c) = Cosigned By    Initials Name Provider Type    Dunia Hughes, PT Physical Therapist        Mobility     Row Name 01/22/20 1058          Bed Mobility Assessment/Treatment    Bed Mobility Assessment/Treatment  supine-sit;sit-supine  -     [place] Level (Bed Mobility)  independent increase effort to come to sit  -     Supine-Sit [place] (Bed Mobility)  independent  -     Sit-Supine [place] (Bed Mobility)  independent  -     Row Name 01/22/20 1058          Sit-Stand Transfer    Sit-Stand [place] (Transfers)  supervision  -     Row Name 01/22/20 1058          Gait/Stairs Assessment/Training    Gait/Stairs Assessment/Training  gait/ambulation independence;distance ambulated;gait pattern;gait deviations  -     [place] Level (Gait)  contact guard;stand by assist  -     Distance in Feet (Gait)  200 ft   -     Pattern (Gait)  step-through  -     Deviations/Abnormal Patterns (Gait)  stride length decreased noted occassional decrease step length on L  -     Bilateral Gait Deviations  -- decrease foot clearance   -       User Key  (r) = Recorded By, (t) = Taken By, (c) = Cosigned By    Initials Name Provider Type    Dunia Hughes, PT Physical  Therapist        Obj/Interventions     [facility] Name 01/22/20 1058          General ROM    GENERAL ROM COMMENTS  AROM B U/LEs WFLs   -[facility] Name 01/22/20 1058          MMT (Manual Muscle Testing)    General MMT Comments  moves B UEs against gravity w/out difficulty, defer to OT for formal assessment; B LEs grossly 4+ to 5/5  -[facility] Name 01/22/20 1058          Static Sitting Balance    Level of [place] (Unsupported Sitting, Static Balance)  independent  -     Sitting Position (Unsupported Sitting, Static Balance)  sitting on edge of bed  -[facility] Name 01/22/20 1058          Dynamic Sitting Balance    Level of [place], Reaches Outside Midline (Sitting, Dynamic Balance)  supervision  -     Sitting Position, Reaches Outside Midline (Sitting, Dynamic Balance)  sitting on edge of bed  -[facility] Name 01/22/20 1058          Static Standing Balance    Level of [place] (Supported Standing, Static Balance)  standby assist  -     Comment (Supported Standing, Static Balance)  assessed static standing w/ feet hip distance apart - SBA, feet together - SBA, eyes closed w/ feet together w/ noted mild postural sway; pt did not experience any LOB in standing this date  -JE     Row Name 01/22/20 1058          Dynamic Standing Balance    Level of [place], Reaches Outside Midline (Standing, Dynamic Balance)  contact guard assist  -     Comment, Reaches Outside Midline (Standing, Dynamic Balance)  no LOB reaching for targets unilaterally and contralaterally  -JE     Row Name 01/22/20 1058          Sensory Assessment/Intervention    Sensory General Assessment  -- no reported N/T since yesterday, at that time L upper lip; mild clumsiness noted L foot w/ COURT  -       User Key  (r) = Recorded By, (t) = Taken By, (c) = Cosigned By    Initials Name Provider Type    Dunia Hughes, PT Physical Therapist        Goals/Plan    No documentation.       Clinical Impression     [facility] Name 01/22/20 1058           Pain Assessment    Additional Documentation  Pain Scale: Numbers Pre/Post-Treatment (Group) L sided weakness since stroke in November  -     Row Name 01/22/20 1058          Pain Scale: Numbers Pre/Post-Treatment    Pain Scale: Numbers, Pretreatment  0/10 - no pain  -     Pain Scale: Numbers, Post-Treatment  0/10 - no pain  -     Row Name 01/22/20 1058          Plan of Care Review    Plan of Care Reviewed With  patient  -     Progress  improving  -     Outcome Summary  PT eval completed.  Pt motivated and eager to return to prior level of function.  Pt demonstrates equal strength and good balance w/ only mild coordination deficit noted in the L LE.  Pt reports he is functioning at baseline since last stroke in November.  Reinforced use of rwx for times w/ decrease stability.  Pt/caregiver report he has continued to use rwx at home at times w/ not being a problem.  Pt does not demonstrate need for skilled PT at this time.  Will sign off.  Please reorder if anything changes.  -     Row Name 01/22/20 1058          Physical Therapy Clinical Impression    Patient/Family Goals Statement (PT Clinical Impression)  to eat again  -     Criteria for Skilled Interventions Met (PT Clinical Impression)  no problems identified which require skilled intervention  -     Rehab Potential (PT Clinical Summary)  other (see comments) no need for skilled PT  -     Predicted Duration of Therapy (PT)  one visit for eval and education only  -     Row Name 01/22/20 1058          Vital Signs    Posttreatment Heart Rate (beats/min)  57  -JE     O2 Delivery Pre Treatment  room air  -     O2 Delivery Intra Treatment  room air  -     Post SpO2 (%)  97  -     O2 Delivery Post Treatment  room air  -     Pre Patient Position  Supine  -     Intra Patient Position  Standing  -     Post Patient Position  Supine  -     Row Name 01/22/20 1058          Positioning and Restraints    Pre-Treatment Position  in bed  -      Post Treatment Position  bed  -JE     In Bed  fowlers;call light within reach;encouraged to call for assist;with family/caregiver;side rails up x2;SCD pump applied  -ISIS       User Key  (r) = Recorded By, (t) = Taken By, (c) = Cosigned By    Initials Name Provider Type    Dunia Hughes, PT Physical Therapist        Outcome Measures     Row Name 01/22/20 1058          How much help from another person do you currently need...    Turning from your back to your side while in flat bed without using bedrails?  4  -JE     Moving from lying on back to sitting on the side of a flat bed without bedrails?  4  -JE     Moving to and from a bed to a chair (including a wheelchair)?  4  -JE     Standing up from a chair using your arms (e.g., wheelchair, bedside chair)?  4  -JE     Climbing 3-5 steps with a railing?  3  -JE     To walk in hospital room?  3  -     AM-PAC 6 Clicks Score (PT)  22  -ISIS     Row Name 01/22/20 1058          Modified [place] Scale    Pre-Stroke Modified [place] Scale  2 - Slight disability.  Unable to carry out all previous activities but able to look after own affairs without assistance.  -JE     Modified [place] Scale  2 - Slight disability.  Unable to carry out all previous activities but able to look after own affairs without assistance.  -     Row Name 01/22/20 1058          Functional Assessment    Outcome Measure Options  AM-PAC 6 Clicks Basic Mobility (PT);Modified [place]  -ISIS       User Key  (r) = Recorded By, (t) = Taken By, (c) = Cosigned By    Initials Name Provider Type    Dunia Hughes, PT Physical Therapist        Physical Therapy Education                 Title: PT OT SLP Therapies (Done)     Topic: Physical Therapy (Done)     Point: Precautions (Done)     Description:   Instruct learner(s) on prescribed precautions during mobility and gait tasks              Learning Progress Summary           Patient Acceptance, E,TB,D, VU,DU by ISIS at 1/22/2020 1217    Comment:  Education re:  purpose of PT/importance of activity; education for safety/falls prevention w/ use of rwx for improved stability   Significant Other Acceptance, E,TB,D, VU,DU by  at 1/22/2020 1217    Comment:  Education re: purpose of PT/importance of activity; education for safety/falls prevention w/ use of rwx for improved stability                               User Key     Initials Effective Dates Name Provider Type Discipline     08/02/18 -  Dunia Vega, PT Physical Therapist PT              PT Recommendation and Plan     Outcome Summary/Treatment Plan (PT)  Anticipated Equipment Needs at Discharge (PT): (has 4 wheeled rwx w/ brakes w/ seat and 2 wheeled rwx)  Anticipated Discharge Disposition (PT): home with assist  Plan of Care Reviewed With: patient, significant other  Progress: improving  Outcome Summary: PT eval completed.  Pt motivated and eager to return to prior level of function.  Pt demonstrates equal strength and good balance w/ only mild coordination deficit noted in the L UE/LE.  Pt reports he is functioning at baseline since last stroke in November.  Reinforced use of rwx for times w/ decrease stability.  Pt/caregiver report he has continued to use rwx at home at times w/ not being a problem.  Pt does not demonstrate need for skilled PT at this time.  Will sign off.  Please reorder if anything changes.     Time Calculation:   PT Charges     Row Name 01/22/20 1218             Time Calculation    Start Time  1058  -      Stop Time  1155  -      Time Calculation (min)  57 min  -      PT Received On  01/22/20  -        User Key  (r) = Recorded By, (t) = Taken By, (c) = Cosigned By    Initials Name Provider Type     Dunia Vega, PT Physical Therapist        Therapy Charges for Today     Code Description Service Date Service Provider Modifiers Qty    [id number] HC PT EVAL LOW COMPLEXITY 4 1/22/2020 Dnuia Vega, PT GP 1          PT G-Codes  Outcome Measure Options: AM-PAC 6 Clicks Daily  Activity (OT)  AM-PAC 6 Clicks Score (PT): 22  AM-PAC 6 Clicks Score (OT): 24  Modified [place] Scale: 2 - Slight disability.  Unable to carry out all previous activities but able to look after own affairs without assistance.    Dunia Vega, PT  1/22/2020

## 2020-01-22 NOTE — PLAN OF CARE
Problem: Patient Care Overview  Goal: Plan of Care Review  Outcome: Ongoing (interventions implemented as appropriate)  Flowsheets (Taken 1/22/2020 1216)  Progress: improving  Plan of Care Reviewed With: patient;significant other  Outcome Summary: PT eval completed.  Pt motivated and eager to return to prior level of function.  Pt demonstrates equal strength and good balance w/ only mild coordination deficit noted in the L LE.  Pt reports he is functioning at baseline since last stroke in November.  Reinforced use of rwx for times w/ decrease stability.  Pt/caregiver report he has continued to use rwx at home at times w/ not being a problem.  Pt does not demonstrate need for skilled PT at this time.  Will sign off.  Please reorder if anything changes.

## 2020-01-23 NOTE — THERAPY DISCHARGE NOTE
Acute Care - Speech Language Pathology Discharge Summary  [facility]       Patient Name: Daniele Delong  : 1950  MRN: [id number]    Today's Date: 2020  Onset of Illness/Injury or Date of Surgery: 20       Referring Physician: KWABENA Montiel        Admit Date: 2020      SLP Recommendation and Plan  Regular solids and thin liquids    Visit Dx:    ICD-10-CM ICD-9-CM   1. Oropharyngeal dysphagia R13.12 787.22   2. Gait abnormality R26.9 781.2               SLP GOALS     Row Name 20 1000 20 0958 20 0818       Oral Nutrition/Hydration Goal 1 (SLP)    Oral Nutrition/Hydration Goal 1, SLP  Pt will tolerate LRD without s/s of aspiration  -MB  Pt will tolerate LRD without s/s of aspiration  -MB  Pt will tolerate LRD without s/s of aspiration  -BN    Time Frame (Oral Nutrition/Hydration Goal 1, SLP)  short term goal (STG);by discharge  -MB  short term goal (STG);by discharge  -MB  short term goal (STG);by discharge  -BN    Barriers (Oral Nutrition/Hydration Goal 1, SLP)  n/a  -MB  n/a  -MB  n/a  -BN    Progress/Outcomes (Oral Nutrition/Hydration Goal 1, SLP)  goal not met  -MB  goal ongoing  -MB  goal ongoing  -BN      User Key  (r) = Recorded By, (t) = Taken By, (c) = Cosigned By    Initials Name Provider Type    Swapnil Duong CCC-SLP Speech and Language Pathologist    Estelita Nj CCC-SLP Speech and Language Pathologist            Therapy Charges for Today     Code Description Service Date Service Provider Modifiers Qty    [id number] HC ST MOTION FLUORO EVAL SWALLOW 3 2020 Swapnil Chaudhary CCC-SLP GN 1            SLP Discharge Summary  Anticipated Dischage Disposition: home with OP services  Reason for Discharge: discharge from this facility  Progress Toward Achieving Short/long Term Goals: goals not met within established timelines  Discharge Destination: home      MILA Lowe  2020

## 2020-01-27 NOTE — DISCHARGE SUMMARY
"[facility]   DISCHARGE SUMMARY       Date of Admission: 1/22/2020  Date of Discharge:  1/22/2020  Primary Care Physician: Tim Peguero MD    Presenting Problem/History of Present Illness:  CVA (cerebral vascular accident) (CMS/[facility]) [I63.9]     Final Discharge Diagnoses:  Active Hospital Problems    Diagnosis   • Paresthesias   • Essential hypertension   • Oropharyngeal dysphagia   • Other hyperlipidemia       Consults: neurology    Procedures Performed: mri brain    Pertinent Test Results: mri neg for acute cva    Chief Complaint on Day of Discharge: \"I feel better\"    History of Present Illness on Day of Discharge: recent numbness and tingling resolved     Hospital Course:  The patient is a 69 y.o. male who presented to [facility] with hx of cva presented with several hour hx of facial numbness, tingling and ataxia. Mri brain  Was neg for acute cva. Seen by the neurology service--as well as pt speech therapy. Dr Torres cleared for discharge..      Condition on Discharge:  Stable     Physical Exam on Discharge:  /87 (BP Location: Right arm, Patient Position: Lying)   Pulse 68   Temp 98.4 °F (36.9 °C) (Oral)   Resp 18   Ht 170.2 cm (67\")   Wt 81.9 kg (180 lb 8.9 oz)   SpO2 96%   BMI 28.28 kg/m²   Physical Exam   Constitutional: He appears well-developed and well-nourished.   Cardiovascular: Normal rate.   Pulmonary/Chest: Effort normal.   Abdominal: Soft.   Nursing note and vitals reviewed.        Discharge Disposition:  Home or Self Care    Discharge Medications:     Discharge Medications      Continue These Medications      Instructions Start Date   amLODIPine 10 MG tablet  Commonly known as:  NORVASC   10 mg, Per G Tube, Daily      aspirin 81 MG chewable tablet   81 mg, Per G Tube, Daily      atorvastatin 80 MG tablet  Commonly known as:  LIPITOR   80 mg, Per G Tube, Daily      benzocaine 15 MG lozenge lozenge  Commonly known as:  CHLORASEPTIC   1 lozenge, Oral, Every " 2 Hours PRN      carvedilol 12.5 MG tablet  Commonly known as:  COREG   12.5 mg, Per G Tube, 2 Times Daily With Meals      cloNIDine 0.1 MG tablet  Commonly known as:  CATAPRES   0.1 mg, Oral, 2 Times Daily PRN      clopidogrel 75 MG tablet  Commonly known as:  PLAVIX   75 mg, Per G Tube, Daily      famotidine 40 MG tablet  Commonly known as:  PEPCID   40 mg, Per G Tube, Daily      HYDROcodone-acetaminophen 5-325 MG per tablet  Commonly known as:  NORCO   1 tablet, Per G Tube, Every 6 Hours PRN      losartan 50 MG tablet  Commonly known as:  COZAAR   50 mg, Per G Tube, 2 Times Daily             Discharge Diet:   Diet Instructions     Heart healthy diet               Activity at Discharge:   Activity Instructions     Activity as tolerated               Discharge Care Plan/Instructions: take meds as prescribed, f/u with dr nash next week    Follow-up Appointments:   Future Appointments   Date Time Provider Department Center   3/26/2020  2:15 PM Inder Scales PA-C [facility] None       Test Results Pending at Discharge: none    Roverto Colon MD  01/26/20  7:03 PM    Time: 7:05pm

## 2020-03-04 ENCOUNTER — TELEPHONE (OUTPATIENT)
Dept: VASCULAR SURGERY | Facility: CLINIC | Age: 70
End: 2020-03-04

## 2020-03-04 NOTE — TELEPHONE ENCOUNTER
LEFT MESS FOR PT ASKING FOR A CALL BACK TO SEE IF HE CAN COME IN TOMORROW FOR AN APPT WITH DR BUTLER.

## 2020-03-05 ENCOUNTER — TELEPHONE (OUTPATIENT)
Dept: VASCULAR SURGERY | Facility: CLINIC | Age: 70
End: 2020-03-05

## 2020-03-05 NOTE — TELEPHONE ENCOUNTER
CALLED PT AND DAUGHTER AND LEFT MESSAGES ASKING THEM TO CALL BACK AND RESCHEDULE APPT WITH DR BUTLER.

## 2020-03-05 NOTE — TELEPHONE ENCOUNTER
Called and left messages at both numbers on file and left messages about appointment with Dr. Kat that was missed.  Left office phone number for a return call.

## 2020-03-23 ENCOUNTER — TELEPHONE (OUTPATIENT)
Dept: VASCULAR SURGERY | Facility: CLINIC | Age: 70
End: 2020-03-23

## 2020-03-23 NOTE — TELEPHONE ENCOUNTER
Spoke with patient and advised that Dr. Kat needed a copy of the disc that contained the CTA done at [facility].  Advised that once patient was able to obtain that disc we would schedule the patient.  Spoke with Dr. Kat who advised that this also needed to be sent to Cardiovascular surgery. I gutierres end information to them.

## 2020-05-22 ENCOUNTER — TELEPHONE (OUTPATIENT)
Dept: VASCULAR SURGERY | Facility: CLINIC | Age: 70
End: 2020-05-22

## 2020-05-22 NOTE — TELEPHONE ENCOUNTER
Left message reminding Mr Delong of his appointment for Tuesday, May 26th, 2020 at 145 pm with Dr Kat. Also advised if he had any questions or needed to reschedule to please call the office at [phone].

## 2020-05-26 ENCOUNTER — OFFICE VISIT (OUTPATIENT)
Dept: VASCULAR SURGERY | Facility: CLINIC | Age: 70
End: 2020-05-26

## 2020-05-26 VITALS
BODY MASS INDEX: 30.76 KG/M2 | DIASTOLIC BLOOD PRESSURE: 84 MMHG | SYSTOLIC BLOOD PRESSURE: 134 MMHG | WEIGHT: 196 LBS | HEART RATE: 60 BPM | OXYGEN SATURATION: 96 % | HEIGHT: 67 IN

## 2020-05-26 DIAGNOSIS — Z13.6 SCREENING FOR AAA (ABDOMINAL AORTIC ANEURYSM): ICD-10-CM

## 2020-05-26 DIAGNOSIS — I71.21 ASCENDING AORTIC ANEURYSM (HCC): Primary | ICD-10-CM

## 2020-05-26 DIAGNOSIS — I10 ESSENTIAL HYPERTENSION: ICD-10-CM

## 2020-05-26 DIAGNOSIS — I73.9 PAD (PERIPHERAL ARTERY DISEASE) (HCC): ICD-10-CM

## 2020-05-26 DIAGNOSIS — E78.49 OTHER HYPERLIPIDEMIA: ICD-10-CM

## 2020-05-26 DIAGNOSIS — I65.23 BILATERAL CAROTID ARTERY STENOSIS: ICD-10-CM

## 2020-05-26 PROCEDURE — 99204 OFFICE O/P NEW MOD 45 MIN: CPT | Performed by: SURGERY

## 2020-05-26 NOTE — PROGRESS NOTES
2020      Tim Peguero MD  [address] [place] [id number]    Daniele Delong  1950    Chief Complaint   Patient presents with   • Establish Care     Referred over by Dr Tim Peguero for Abdominal Aortic Aneurysm and Occlusion & Stenosis of Basilar Arteries.   • other     Patient states he has had dizziness, sees double of things, and some days are better than others.        Dear Tim Peguero MD    HPI  I had the pleasure of seeing your patient Daniele Delong in the office today.  Thank you kindly for this consultation.  As you recall, Daniele Delong is a 70 y.o.  male who you are currently following for routine health maintenance.  He has a history of stroke.  He reports dizziness and blurred vision.  He is maintained on aspirin and Lipitor.  He denies unilateral weakness, but reports feeling like he wants to fall to the left.  He did have a MRA of the brain and carotids, which I did personally review.  He was a previous smoker, but quit in October.  He does have some residual speech issues per his report.  During workup, he was found to have an ascending thoracic aortic aneurysm measuring 4.1 cm.      Past Medical History:   Diagnosis Date   • Coronary artery disease    • Hyperlipidemia    • Hypertension    • Myocardial infarction (CMS/HCC)    • Stroke (CMS/HCC)        Past Surgical History:   Procedure Laterality Date   • APPENDECTOMY     • CAROTID ENDARTERECTOMY     • CHOLECYSTECTOMY     • COLON SURGERY     • GTUBE INSERTION         History reviewed. No pertinent family history.    Social History     Socioeconomic History   • Marital status: Single     Spouse name: Not on file   • Number of children: Not on file   • Years of education: Not on file   • Highest education level: Not on file   Tobacco Use   • Smoking status: Former Smoker     Last attempt to quit: 10/27/2019     Years since quittin.5   • Smokeless tobacco: Never Used   Substance and Sexual Activity   •  "Alcohol use: No     Frequency: Never   • Drug use: No   • Sexual activity: Defer       No Known Allergies      Current Outpatient Medications:   •  amLODIPine (NORVASC) 10 MG tablet, 10 mg by Per G Tube route Daily., Disp: , Rfl:   •  aspirin 81 MG chewable tablet, 81 mg by Per G Tube route Daily., Disp: , Rfl:   •  atorvastatin (LIPITOR) 80 MG tablet, 80 mg by Per G Tube route Daily., Disp: , Rfl:   •  benzocaine (CHLORASEPTIC) 15 MG lozenge lozenge, Take 1 lozenge by mouth Every 2 (Two) Hours As Needed., Disp: , Rfl:   •  carvedilol (COREG) 12.5 MG tablet, 12.5 mg by Per G Tube route 2 (Two) Times a Day With Meals., Disp: , Rfl:   •  cloNIDine (CATAPRES) 0.1 MG tablet, Take 1 tablet by mouth 2 (Two) Times a Day As Needed for High Blood Pressure (Systolic blood pressure over 160)., Disp: 10 tablet, Rfl: 0  •  losartan (COZAAR) 100 MG tablet, 100 mg by Per G Tube route 2 (Two) Times a Day., Disp: , Rfl:     Review of Systems   Constitutional: Negative.    HENT: Negative.    Eyes: Positive for visual disturbance.   Respiratory: Negative.    Cardiovascular: Negative.    Gastrointestinal: Negative.    Endocrine: Negative.    Genitourinary: Negative.    Musculoskeletal: Negative.    Skin: Negative.    Allergic/Immunologic: Negative.    Neurological: Positive for dizziness.   Hematological: Negative.    Psychiatric/Behavioral: Negative.    All other systems reviewed and are negative.    /84 (BP Location: Left arm, Patient Position: Sitting, Cuff Size: Adult)   Pulse 60   Ht 170.2 cm (67\")   Wt 88.9 kg (196 lb)   SpO2 96%   BMI 30.70 kg/m²     Physical Exam   Constitutional: He is oriented to person, place, and time. He appears well-developed and well-nourished.   HENT:   Head: Normocephalic and atraumatic.   Eyes: Pupils are equal, round, and reactive to light. No scleral icterus.   Neck: Neck supple. No JVD present. Carotid bruit is not present. No thyromegaly present.   Cardiovascular: Normal rate, regular " rhythm and normal heart sounds.   Pulses:       Carotid pulses are 2+ on the right side, and 2+ on the left side.       Femoral pulses are 2+ on the right side, and 2+ on the left side.       Popliteal pulses are 2+ on the right side, and 2+ on the left side.        Dorsalis pedis pulses are 2+ on the right side, and 2+ on the left side.        Posterior tibial pulses are 2+ on the right side, and 2+ on the left side.   Pulmonary/Chest: Effort normal and breath sounds normal.   Abdominal: Soft. Bowel sounds are normal. He exhibits no distension, no abdominal bruit and no mass. There is no hepatosplenomegaly. There is no tenderness.   Musculoskeletal: Normal range of motion. He exhibits no edema.   Lymphadenopathy:     He has no cervical adenopathy.   Neurological: He is alert and oriented to person, place, and time. He has normal strength. No cranial nerve deficit or sensory deficit.   Skin: Skin is warm, dry and intact.   Psychiatric: He has a normal mood and affect. His behavior is normal. Judgment and thought content normal.   Nursing note and vitals reviewed.    diagnostic Data:          Patient Active Problem List   Diagnosis   • Paresthesias   • Essential hypertension   • Oropharyngeal dysphagia   • Other hyperlipidemia        Diagnosis Plan   1. Ascending aortic aneurysm (CMS/HCC)  Ambulatory Referral to Cardiothoracic Surgery   2. PAD (peripheral artery disease) (CMS/HCC)  US Ankle / Brachial Indices Extremity Complete   3. Screening for AAA (abdominal aortic aneurysm)  US Aorta Limited   4. Bilateral carotid artery stenosis  US Carotid Bilateral   5. Essential hypertension     6. Other hyperlipidemia         Plan: After thoroughly evaluating Daniele Delong, I believe the best course of action is to remain conservative from a vascular surgery standpoint.  With regards to his ascending thoracic aortic aneurysm I will send him to Dr. Mcginnis for continued surveillance.  We see the descending and infrarenal aortic  aneurysms.  I will see him back personally in 1 year with a carotid duplex, screening abdominal aortic ultrasound, and an MADDY of the lower extremities for surveillance. I did discuss vascular risk factors as they pertain to the progression of vascular disease including controlling hypertension and hyperlipidemia.  These risk factors are currently stable. The patient is to continue taking their medications as previously discussed.   This was all discussed in full with complete understanding.  Thank you for allowing me to participate in the care of your patient.  Please do not hesitate to call with any questions or concerns.  We will keep you aware of any further encounters with Daniele Delong.        Sincerely yours,         DO Sudihr Yi Jonathan E, MD

## 2020-05-28 ENCOUNTER — TELEPHONE (OUTPATIENT)
Dept: CARDIOLOGY | Facility: CLINIC | Age: 70
End: 2020-05-28

## 2020-06-01 NOTE — TELEPHONE ENCOUNTER
Called pt again today.  Spoke with wife and appt sched for 8-12-20 at 11am.  New pt packet mailed/seven

## 2020-06-09 ENCOUNTER — OFFICE VISIT (OUTPATIENT)
Dept: NEUROLOGY | Facility: CLINIC | Age: 70
End: 2020-06-09

## 2020-06-09 VITALS
OXYGEN SATURATION: 98 % | WEIGHT: 193 LBS | HEIGHT: 67 IN | BODY MASS INDEX: 30.29 KG/M2 | HEART RATE: 61 BPM | DIASTOLIC BLOOD PRESSURE: 68 MMHG | SYSTOLIC BLOOD PRESSURE: 132 MMHG

## 2020-06-09 DIAGNOSIS — I63.9 RIGHT BASAL GANGLIA EMBOLIC STROKE (HCC): Primary | ICD-10-CM

## 2020-06-09 DIAGNOSIS — I10 HYPERTENSION, ESSENTIAL: ICD-10-CM

## 2020-06-09 DIAGNOSIS — I65.1 BASILAR ARTERY STENOSIS: ICD-10-CM

## 2020-06-09 DIAGNOSIS — I65.02 STENOSIS OF LEFT VERTEBRAL ARTERY: ICD-10-CM

## 2020-06-09 DIAGNOSIS — E78.2 MIXED DYSLIPIDEMIA: ICD-10-CM

## 2020-06-09 PROCEDURE — 99214 OFFICE O/P EST MOD 30 MIN: CPT | Performed by: PHYSICIAN ASSISTANT

## 2020-06-09 RX ORDER — CLOPIDOGREL BISULFATE 75 MG/1
1 TABLET ORAL DAILY
COMMUNITY
End: 2021-05-04

## 2020-06-09 NOTE — PROGRESS NOTES
Neurology Progress Note      Chief Complaint:    Remote right basal ganglia CVA, 2019  Hypertension  Dysphagia  Left facial weakness    Subjective     Subjective:  This is a 70-year-old right-hand-dominant male who sustained a right basal ganglia CVA in 2019 in which time he was transferred to [facility] in [place].  The patient is maintained on Plavix 75 mg daily, aspirin 81 mg daily and atorvastatin 80 mg daily.  At that time, he also had a hypertensive crisis and his blood pressure has been much better controlled.  He has clonidine, only has used it once, and is managed with carvedilol 12.5 mg twice daily and losartan 100 mg daily.    His only complaint since completing outpatient therapies is that occasionally, he will have dizziness after standing for several minutes.  He has had no presyncope or syncope, however, was not clearly orthostatic on examination today and continues to have some very mild dysarthria and has been cleared by speech pathology from his dysphasia no longer has a PEG tube in place.      Past Medical History:   Diagnosis Date   • Coronary artery disease    • Hyperlipidemia    • Hypertension    • Myocardial infarction (CMS/HCC)    • Stroke (CMS/HCC)      Past Surgical History:   Procedure Laterality Date   • APPENDECTOMY     • CAROTID ENDARTERECTOMY     • CHOLECYSTECTOMY     • COLON SURGERY     • GTUBE INSERTION  2020     History reviewed. No pertinent family history.  Social History     Tobacco Use   • Smoking status: Former Smoker     Last attempt to quit: 10/27/2019     Years since quittin.6   • Smokeless tobacco: Never Used   Substance Use Topics   • Alcohol use: No     Frequency: Never   • Drug use: No       Medications:  Current Outpatient Medications   Medication Sig Dispense Refill   • amLODIPine (NORVASC) 10 MG tablet Take 10 mg by mouth Daily.     • aspirin 81 MG chewable tablet Chew 81 mg Daily.     • atorvastatin (LIPITOR) 80 MG tablet Take 80 mg by  mouth Daily.     • carvedilol (COREG) 12.5 MG tablet Take 12.5 mg by mouth 2 (Two) Times a Day With Meals.     • cloNIDine (CATAPRES) 0.1 MG tablet Take 1 tablet by mouth 2 (Two) Times a Day As Needed for High Blood Pressure (Systolic blood pressure over 160). 10 tablet 0   • clopidogrel (PLAVIX) 75 MG tablet Take 1 tablet by mouth Daily.     • losartan (COZAAR) 100 MG tablet Take 100 mg by mouth 2 (Two) Times a Day.       No current facility-administered medications for this visit.        Allergies:    Lisinopril    Review of Systems:   -A 14 point review of systems is completed and is negative.      Objective      Vital Signs  Heart Rate:  [61] 61  BP: (132)/(68) 132/68    Physical Exam:    General Exam:  Head:  Normocephalic, atraumatic.  HEENT: PERRLA.  Full EOM.  Neck:  No lymphadenopathy, thyromegaly or bruit.  Cardiac:  Regular rate and rhythm.  Normal S1, S2.  No murmur, rub or gallop.  Lungs:  Clear to auscultation bilaterally.  No wheeze, rales or rhonchi.  Abdomen:  Non-tender, Non-distended.  Bowel sounds normoactive.  Extremities: Full peripheral pulses.  No clubbing, cyanosis or edema.  Skin: No ulceration, breakdown or rash.      Neurologic Exam:  Mental Status:    -Awake. Alert. Oriented to person, place & time.  -No word finding difficulties.  -No aphasia.  -No dysarthria.  -Follows simple commands.     CN II:  Full visual fields with confrontation.  Pupils equally reactive to light.  CN III, IV, VI:  Extraocular muscles function intact with no nystagmus.  CN V:  Facial sensory is symmetric.  CN VII:  Facial motor symmetric.  CN VIII:  Gross hearing intact bilaterally.  CN IX/X:  Palate elevates symmetrically.  CN XI:  Shoulder shrug symmetric.  CN XII:  Tongue is midline on protrusion.     Motor: (strength out of 5:  1= minimal movement, 2 = movement in plane of gravity, 3 = movement against gravity, 4 = movement against some resistance, 5 = full strength)     -5/5 in bilateral biceps, triceps,  brachioradialis, wrist extensors and intrinsic muscles of the hand.    -5/5 in bilateral hip flexors, quadriceps, hamstrings, gastrocsoleus complex, anterior tibialis and extensor hallucis longus.       Deep Tendon Reflexes:  -Right              Biceps: 2+         Triceps: 2+      Brachioradialis: 2+              Patella: 2+       Ankle: 2+           -Left              Biceps: 2+         Triceps: 2+      Brachioradialis: 2+              Patella: 2+       Ankle: 2+             Tone (Modified Melisa Scale):  No appreciable increase in tone or rigidity noted.     Sensory:  -Intact to light touch, pinprick BUE (C5-T1) and BLE (L2-S1).     Coordination:  -Finger to nose intact BUEs  -Heel to shin intact BLEs  -No ataxia     Gait  -No signs of ataxia  -ambulates unassisted       Results Review:    I reviewed the patient's new clinical results and findings.    MRA BRAIN from [facility] on 4/24/20:      No components found for: A1C  Lab Results   Component Value Date    HDL 28 (L) 11/14/2019    LDL 86 11/14/2019     No components found for: B12  No results found for: TSH    Assessment/Plan     Impression:  1.  Right basal ganglia CVA, November 2019  2.  Mild residual dysarthria  3.  Hypertension, essential  4.  Likely orthostatic hypotension  5.  Dyslipidemia, mixed  6.  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  Continue follow-up with vascular surgery regarding vascular disease of the head and neck.  He has apparently already seen Dr. Kat.    2.  Continue Plavix 75 mg and aspirin 81 mg daily as dual antiplatelet therapy for secondary stroke prophylaxis.    3.  Continue atorvastatin 80 mg daily target LDL less than 70.    4.  I suspect that the patient has mild orthostasis.  I would advise against using clonidine as much as possible for any precipitous drop in blood pressure.  I would rather him have some briefly permissive hypertension at times, as needed rather than too precipitously  "reduced blood pressure especially in the setting of very high-grade stenosis within his mid basilar artery.  He also has high-grade atherosclerotic disease of the left vertebral artery.  Having said, continue target systolic pressure less than 140 diastolic less than 90, however, blood pressure of 140 to even 150 and in the 150s could be reasonable.    5.  I have recommended instituting regular cardiovascular exercise in the form of walking, biking or swimming 30-40 minutes at a time at least 3-4 times per week.  Patient is walking about 8 miles per week.    6.  Annual carotid and vertebral artery surveillance with vascular surgery.    7.  Patient is counseled on stroke signs and symptoms using FAST and \"Time Saved is Brain Saved.\"    8.  Follow-up with me in November 2020.  This year anniversary from his stroke if he remained stable, he can follow-up with neurology on an as-needed basis.  Certainly happy to reevaluate at any point in the future and have expressed this to the patient and his wife.  In the interim, I have asked him not to work on ladders or heights especially without any supervision until he can follow-up with primary care and evaluate orthostatic hypotension.      Greater than 25 minutes of the 35-minute encounter spent in direct face-to-face contact with the patient and his wife regarding the clinical findings, most recent neuroimaging, medications, side effects, risk factor reduction and follow-up plan of care.          Inder Scales PA-C  06/09/20  13:20    "

## 2020-08-12 ENCOUNTER — OFFICE VISIT (OUTPATIENT)
Dept: CARDIAC SURGERY | Facility: CLINIC | Age: 70
End: 2020-08-12

## 2020-08-12 VITALS
DIASTOLIC BLOOD PRESSURE: 70 MMHG | HEIGHT: 67 IN | SYSTOLIC BLOOD PRESSURE: 140 MMHG | OXYGEN SATURATION: 98 % | WEIGHT: 196 LBS | BODY MASS INDEX: 30.76 KG/M2 | HEART RATE: 60 BPM

## 2020-08-12 DIAGNOSIS — I71.20 THORACIC AORTIC ANEURYSM WITHOUT RUPTURE (HCC): Primary | ICD-10-CM

## 2020-08-12 PROCEDURE — 99204 OFFICE O/P NEW MOD 45 MIN: CPT | Performed by: THORACIC SURGERY (CARDIOTHORACIC VASCULAR SURGERY)

## 2020-08-31 PROBLEM — I71.20 THORACIC AORTIC ANEURYSM WITHOUT RUPTURE (HCC): Status: ACTIVE | Noted: 2020-08-31

## 2020-08-31 NOTE — PROGRESS NOTES
Chief Complaint   Patient presents with   • Thoracic Aneurysm     New patient from [facility].          Subjective     History of Present Illness  Mr. Delong is a 70-year-old male with a past medical history of known coronary artery disease, hyperlipidemia, previous myocardial infarction, and stroke who presents with incidental finding of a thoracic ascending aortic aneurysm.  He was seen by Dr. Kat after his most recent stroke work-up with incidental finding of thoracic aortic aneurysm.  He denies a family history of aneurysmal disease, sudden death in family, or a significant valvular family history.  He has had no chest pain.  He does have shortness of breath but this is been also stable for some time.      Review of Systems   Constitutional: Positive for fatigue. Negative for activity change, appetite change, chills, diaphoresis, fever and unexpected weight change.   HENT: Negative for dental problem, hearing loss, nosebleeds, sore throat, trouble swallowing and voice change.    Eyes: Negative for photophobia, redness and visual disturbance.   Respiratory: Positive for shortness of breath. Negative for apnea, cough, chest tightness, wheezing and stridor.    Cardiovascular: Negative for chest pain, palpitations and leg swelling.   Gastrointestinal: Negative for abdominal distention, abdominal pain, blood in stool, constipation, diarrhea, nausea and vomiting.   Endocrine: Negative for cold intolerance, heat intolerance, polyphagia and polyuria.   Genitourinary: Negative for decreased urine volume, difficulty urinating, dysuria, flank pain, frequency, hematuria and urgency.   Musculoskeletal: Positive for arthralgias. Negative for back pain, gait problem, joint swelling, myalgias and neck pain.   Skin: Negative for pallor, rash and wound.   Allergic/Immunologic: Negative for immunocompromised state.   Neurological: Positive for light-headedness. Negative for dizziness, tremors, seizures, syncope, speech  "difficulty, weakness, numbness and headaches.   Hematological: Bruises/bleeds easily.   Psychiatric/Behavioral: Negative for confusion, sleep disturbance and suicidal ideas. The patient is not nervous/anxious.           Past Medical History:   Diagnosis Date   • Coronary artery disease    • Hyperlipidemia    • Hypertension    • Myocardial infarction (CMS/HCC)    • Stroke (CMS/HCC)      Past Surgical History:   Procedure Laterality Date   • APPENDECTOMY     • CAROTID ENDARTERECTOMY     • CHOLECYSTECTOMY     • COLON SURGERY     • GTUBE INSERTION       History reviewed. No pertinent family history.  Social History     Tobacco Use   • Smoking status: Former Smoker     Last attempt to quit: 10/27/2019     Years since quittin.8   • Smokeless tobacco: Never Used   Substance Use Topics   • Alcohol use: No     Frequency: Never   • Drug use: No     Current Outpatient Medications   Medication Sig Dispense Refill   • amLODIPine (NORVASC) 10 MG tablet Take 10 mg by mouth Daily.     • aspirin 81 MG chewable tablet Chew 81 mg Daily.     • atorvastatin (LIPITOR) 80 MG tablet Take 80 mg by mouth Daily.     • carvedilol (COREG) 12.5 MG tablet Take 12.5 mg by mouth 2 (Two) Times a Day With Meals.     • cloNIDine (CATAPRES) 0.1 MG tablet Take 1 tablet by mouth 2 (Two) Times a Day As Needed for High Blood Pressure (Systolic blood pressure over 160). 10 tablet 0   • clopidogrel (PLAVIX) 75 MG tablet Take 1 tablet by mouth Daily.     • losartan (COZAAR) 100 MG tablet Take 100 mg by mouth 2 (Two) Times a Day.       No current facility-administered medications for this visit.      Allergies:  Lisinopril    Objective      Vital Signs  Visit Vitals  /70 (BP Location: Left arm, Patient Position: Sitting, Cuff Size: Adult)   Pulse 60   Ht 170.2 cm (67\")   Wt 88.9 kg (196 lb)   SpO2 98%   BMI 30.70 kg/m²         Physical Exam   Constitutional: He is oriented to person, place, and time. He appears well-developed.   HENT:   Head: " "Normocephalic and atraumatic.   Mouth/Throat: Oropharynx is clear and moist.   Eyes: Pupils are equal, round, and reactive to light. EOM are normal.   Neck: Normal range of motion. Neck supple. No JVD present. No tracheal deviation present. No thyromegaly present.   Cardiovascular: Normal rate, regular rhythm, normal heart sounds and intact distal pulses. Exam reveals no gallop and no friction rub.   No murmur heard.  Pulmonary/Chest: Effort normal and breath sounds normal. No respiratory distress. He has no wheezes. He has no rales. He exhibits no tenderness.   Abdominal: Soft. He exhibits no distension. There is no tenderness.   Musculoskeletal: Normal range of motion. He exhibits no edema.   Lymphadenopathy:     He has no cervical adenopathy.   Neurological: He is alert and oriented to person, place, and time. No cranial nerve deficit.   Skin: Skin is warm and dry.   Psychiatric: He has a normal mood and affect.       Results Review:     Interpretation Summary     · Left ventricular systolic function is normal. Estimated EF appears to be in the range of 61 - 65%.  · Left ventricular wall thickness is consistent with moderate concentric hypertrophy.  · No evidence of a patent foramen ovale.      Patient Hx Of Height, Weight, and Vitals     Height Weight BSA (Calculated - sq m) BMI (kg/m2) Pulse BP   170.2 cm (67\") 81.9 kg (180 lb 8.9 oz) 1.94 sq meters 28.34 68 163/87   Reason for Exam     Cardiac source of emboli   Cardiac History     Diagnosis Date Comment Source   Coronary artery disease   Provider   Hyperlipidemia   Provider   Hypertension   Provider   Study Description     2-D Echo performed with color flow and Doppler. The study is technically adequate for diagnosis.   Echocardiogram Findings     Left Ventricle Left ventricular systolic function is normal. Estimated EF appears to be in the range of 61 - 65%. Normal left ventricular cavity size noted. All left ventricular wall segments contract normally. Left " ventricular wall thickness is consistent with moderate concentric hypertrophy.   Right Ventricle Normal right ventricular cavity size and systolic function noted.   Left Atrium Left atrial cavity size is borderline dilated. Left atrial volume is borderline increased. No evidence of a patent foramen ovale. Saline test results are negative.   Right Atrium Normal right atrial size noted.   Aortic Valve The aortic valve is structurally normal. No significant aortic valve regurgitation is present. No hemodynamically significant aortic valve stenosis is present.   Mitral Valve The mitral valve is normal in structure. Trace mitral valve regurgitation is present. No significant mitral valve stenosis is present.   Tricuspid Valve The tricuspid valve is normal. No evidence of tricuspid valve stenosis is present. Trace tricuspid valve regurgitation is present. Estimated right ventricular systolic pressure from tricuspid regurgitation is mildly elevated (35-45 mmHg).   Pulmonic Valve The pulmonic valve is not well visualized. The pulmonic valve is grossly normal in structure. There is no significant pulmonic valve stenosis present. There is no significant pulmonic valve regurgitation present.   Greater Vessels No dilation of the aortic root is present. No dilation of the sinuses of Valsalva is present.   Pericardium The pericardium is normal. There is no evidence of pericardial effusion.      LV Measurements     Dimensions   LVIDd 3.9 cm      IVSd 1.8 cm      FS 30.3 %      ESV(cubed) 19.9 ml      EDV(cubed) 58.9 ml      LVOT area 3.5 cm^2      LVOT diam 2.1 cm      Diastolic Filling   MV E max oswaldo 85.9 cm/sec      MV A max oswaldo 85.4 cm/sec      MV dec time 0.3 sec      MV E/A 1       LA Volume Index 30.4 mL/m2      Med Peak E' Oswaldo 7.2 cm/sec      Lat Peak E' Oswaldo 9 cm/sec      Avg E/e' ratio 10.6       Shunt Ratio   SV(LVOT) 134.4 ml       Dimensions   LVIDs 2.7 cm      LVPWd 1.5 cm      IVS/LVPW 1.3       LV Sys Vol (BSA  corrected) 15.8 ml/m^2      LV Dillard Vol (BSA corrected) 40.3 ml/m^2      LV mass(C)d 259.6 grams      EDV(MOD-sp4) 78 ml      ESV(MOD-sp4) 30.6 ml      Systolic Function   SV(MOD-sp4) 47.4 ml      SI(MOD-sp4) 24.5 ml/m^2      EF(MOD-sp4) 60.8 %         LA Measurements     LA Dimensions   LA dimension 3.9 cm      LA volume 58.6 cm3      LA Volume Index 30.4 mL/m2         Aortic Valve Measurements     Stenosis   LVOT diam 2.1 cm      LV V1 max 137 cm/sec      LV V1 max PG 7.5 mmHg      LV V1 mean PG 4 mmHg      LV V1 VTI 38.8 cm      Ao pk srikanth 178 cm/sec       Stenosis   Ao max PG 12.7 mmHg      Ao mean PG 7 mmHg      Ao V2 VTI 45.3 cm      NISA(I,D) 3 cm^2         Mitral Valve Measurements     Stenosis   MV dec slope 290 cm/sec^2      MV dec time 0.3 sec          Tricuspid Valve Measurements     Regurgitation   TR max srikanth 251 cm/sec      RVSP(TR) 35.2 mmHg      RAP systole 10 mmHg      PISA   TR max srikanth 251 cm/sec          Greater Vessels Measurements     Ao root diam 3.2 cm      Ao root area (BSA corrected) 1.7            CT scan of the chest obtained November 2019 shows an ascending aortic aneurysm measuring 4.1 cm in size that is fusiform in morphology.  No evidence of acute aortic dissection.     I reviewed the patient's new clinical results.  Discussed with patient      Assessment/Plan       Daniele was seen today for thoracic aneurysm.    Diagnoses and all orders for this visit:    Thoracic aortic aneurysm without rupture (CMS/HCC)  -     CT Angiogram Chest; Future          I discussed the patients findings and my recommendations with patient and .  We discussed the natural course history of aortic aneurysmal disease. We discussed the specific size of aneurysm today and potential risk of aortic complications. We discussed the operative treatment of aneursymal disease broadly. At this time we discussed the recommendation to plan surveillance with CT scans. (4.1 cm)   We discussed signs and symptoms of acute aortic  pathology and the need to present to the emergency department for further evaluation. Lastly, we discussed the value of exercise while being mindful of a known aneurysm and the potential risk that high intensity, isometric, or valsalva type exercises presents.   Potential medical therapy including the use of a beta-blocker and perhaps other agents to accomplish strict control of pressure were discussed.     He is a non-smoker.    Patient's Body mass index is 30.7 kg/m². BMI is above normal parameters. Recommendations include: exercise counseling, nutrition counseling and referral to primary care.    I will plan to see him back in 1 year.

## 2020-12-01 ENCOUNTER — TELEPHONE (OUTPATIENT)
Dept: OTHER | Age: 70
End: 2020-12-01

## 2020-12-04 ENCOUNTER — TELEPHONE (OUTPATIENT)
Dept: OTHER | Age: 70
End: 2020-12-04

## 2020-12-11 ENCOUNTER — OFFICE VISIT (OUTPATIENT)
Dept: NEUROLOGY | Facility: CLINIC | Age: 70
End: 2020-12-11

## 2020-12-11 VITALS
SYSTOLIC BLOOD PRESSURE: 120 MMHG | BODY MASS INDEX: 29.66 KG/M2 | OXYGEN SATURATION: 97 % | WEIGHT: 189 LBS | DIASTOLIC BLOOD PRESSURE: 76 MMHG | HEIGHT: 67 IN | HEART RATE: 67 BPM

## 2020-12-11 DIAGNOSIS — I63.9 RIGHT BASAL GANGLIA EMBOLIC STROKE (HCC): Primary | ICD-10-CM

## 2020-12-11 DIAGNOSIS — I65.1 BASILAR ARTERY STENOSIS: ICD-10-CM

## 2020-12-11 DIAGNOSIS — I65.02 STENOSIS OF LEFT VERTEBRAL ARTERY: ICD-10-CM

## 2020-12-11 DIAGNOSIS — E78.2 MIXED DYSLIPIDEMIA: ICD-10-CM

## 2020-12-11 DIAGNOSIS — G47.10 HYPERSOMNOLENCE: ICD-10-CM

## 2020-12-11 DIAGNOSIS — I95.1 ORTHOSTATIC HYPOTENSION: ICD-10-CM

## 2020-12-11 DIAGNOSIS — G47.30 OBSERVED SLEEP APNEA: ICD-10-CM

## 2020-12-11 PROCEDURE — 99214 OFFICE O/P EST MOD 30 MIN: CPT | Performed by: PHYSICIAN ASSISTANT

## 2020-12-11 RX ORDER — LOSARTAN POTASSIUM AND HYDROCHLOROTHIAZIDE 12.5; 5 MG/1; MG/1
1 TABLET ORAL DAILY
COMMUNITY
Start: 2020-12-08

## 2020-12-11 RX ORDER — CARVEDILOL 6.25 MG/1
1 TABLET ORAL 2 TIMES DAILY
COMMUNITY
Start: 2020-11-12

## 2020-12-11 NOTE — PROGRESS NOTES
Neurology Progress Note      Chief Complaint:    Remote right basal ganglia CVA, 2019  Hypertension  Dysphagia  Left facial weakness  Dizziness  Observed sleep apnea  Daytime hypersomnolence    Subjective     Subjective:  Patient returns clinic today for follow-up evaluation of stroke sustained in 2019.  Patient is maintained on Plavix 35 mg daily, aspirin 81 mg daily and atorvastatin 80 mg daily.  The patient also has recently had a change in his blood pressure medication with a reduction in his carvedilol and change in his Cozaar to that plus the addition of hydrochlorothiazide.  The patient continues to complain of episodes of dizziness especially when he bends over to pet his dog and arises suddenly.  Has no presyncope or syncope.    The patient also is complaining of significant fatigue.  He is having difficulty both initiating but primarily maintaining sleep with frequent arousals.  He has had observed apneic episodes according to his wife who is present today.  He has profound daytime hypersomnolence.  He falls asleep after most meals and will fall asleep also as a passenger in the vehicle or if watching television.  He has never had a sleep study before.  He denies any depressive symptoms.      Past Medical History:   Diagnosis Date   • Coronary artery disease    • Hyperlipidemia    • Hypertension    • Myocardial infarction (CMS/HCC)    • Stroke (CMS/HCC)      Past Surgical History:   Procedure Laterality Date   • APPENDECTOMY     • CAROTID ENDARTERECTOMY     • CHOLECYSTECTOMY     • COLON SURGERY     • GTUBE INSERTION       No family history on file.  Social History     Tobacco Use   • Smoking status: Former Smoker     Quit date: 10/27/2019     Years since quittin.1   • Smokeless tobacco: Never Used   Substance Use Topics   • Alcohol use: No     Frequency: Never   • Drug use: No       Medications:  Current Outpatient Medications   Medication Sig Dispense Refill   • amLODIPine  (NORVASC) 10 MG tablet Take 10 mg by mouth Daily.     • aspirin 81 MG chewable tablet Chew 81 mg Daily.     • atorvastatin (LIPITOR) 80 MG tablet Take 80 mg by mouth Daily.     • carvedilol (COREG) 6.25 MG tablet Take 1 tablet by mouth 2 (two) times a day.     • cloNIDine (CATAPRES) 0.1 MG tablet Take 1 tablet by mouth 2 (Two) Times a Day As Needed for High Blood Pressure (Systolic blood pressure over 160). 10 tablet 0   • clopidogrel (PLAVIX) 75 MG tablet Take 1 tablet by mouth Daily.     • losartan-hydrochlorothiazide (HYZAAR) 100-25 MG per tablet Take 1 tablet by mouth Daily.       No current facility-administered medications for this visit.        Allergies:    Lisinopril    Review of Systems:   -A 14 point review of systems is completed and is negative.      Objective      Vital Signs  Heart Rate:  [67] 67  BP: (120)/(76) 120/76    Physical Exam:    General Exam:  Head:  Normocephalic, atraumatic.  HEENT: PERRLA.  Full EOM.  Neck:  No lymphadenopathy, thyromegaly or bruit.  Cardiac:  Regular rate and rhythm.  Normal S1, S2.  No murmur, rub or gallop.  Lungs:  Clear to auscultation bilaterally.  No wheeze, rales or rhonchi.  Abdomen:  Non-tender, Non-distended.  Bowel sounds normoactive.  Extremities: Full peripheral pulses.  No clubbing, cyanosis or edema.  Skin: No ulceration, breakdown or rash.      Neurologic Exam:  Mental Status:    -Awake. Alert. Oriented to person, place & time.  -No word finding difficulties.  -No aphasia.  -No dysarthria.  -Follows simple commands.     CN II:  Full visual fields with confrontation.  Pupils equally reactive to light.  CN III, IV, VI:  Extraocular muscles function intact with no nystagmus.  CN V:  Facial sensory is symmetric.  CN VII:  Facial motor symmetric.  CN VIII:  Gross hearing intact bilaterally.  CN IX/X:  Palate elevates symmetrically.  CN XI:  Shoulder shrug symmetric.  CN XII:  Tongue is midline on protrusion.     Motor: (strength out of 5:  1= minimal  movement, 2 = movement in plane of gravity, 3 = movement against gravity, 4 = movement against some resistance, 5 = full strength)     -5/5 in bilateral biceps, triceps, brachioradialis, wrist extensors and intrinsic muscles of the hand.    -5/5 in bilateral hip flexors, quadriceps, hamstrings, gastrocsoleus complex, anterior tibialis and extensor hallucis longus.       Deep Tendon Reflexes:  -Right              Biceps: 2+         Triceps: 2+      Brachioradialis: 2+              Patella: 2+       Ankle: 2+           -Left              Biceps: 2+         Triceps: 2+      Brachioradialis: 2+              Patella: 2+       Ankle: 2+             Tone (Modified Melisa Scale):  No appreciable increase in tone or rigidity noted.     Sensory:  -Intact to light touch, pinprick BUE (C5-T1) and BLE (L2-S1).     Coordination:  -Finger to nose intact BUEs  -Heel to shin intact BLEs  -No ataxia     Gait  -No signs of ataxia  -ambulates unassisted       Results Review:    I reviewed the patient's new clinical results and findings.      No components found for: A1C  Lab Results   Component Value Date    HDL 28 (L) 11/14/2019    LDL 86 11/14/2019     No components found for: B12  No results found for: TSH    Assessment/Plan     Impression:  1.  Right basal ganglia CVA, November 2019  2.  Orthostatic hypotension  3.  Observed sleep apnea  4.  Daytime hypersomnolence  5.  Dyslipidemia, mixed  6.  Hypertension, essential  7.  Vertebrobasilar artery stenosis  8.  Abdominal aortic aneurysm (both 7 and 8 followed by vascular surgery)      Plan:  1.  Continue Plavix 75 mg daily and aspirin 81 mg daily as dual antiplatelet therapy for secondary stroke prevention.    2.  Patient is going to continue to follow with Dr. Kat for surveillance of his vertebral basilar artery disease as well as abdominal aortic aneurysm.    3.  Continue atorvastatin with target LDL less than 70.    4.  We discussed orthostatic hypotension at length.  He is  "going to continue to follow with Dr. Peguero, his PCP, regarding this.  If there is no contraindication and the patient has further difficulties, it may be more advantageous to titrate beta-blockade and lower ARB/diuretic combination as the beta-blocker will blunt the swings in his systolic pressure high and low readings and possibly limit his sense of dizziness.    5.  Because patient has observed sleep apnea and symptoms of obstructive sleep apnea with profound daytime fatigue and difficulty with restful sleep, I am arranging for polysomnography.  We will follow up with the patient accordingly, thereafter.  Otherwise, from a stroke follow-up standpoint, I will see the patient on an as-needed basis.    6.  Patient is counseled on stroke signs and symptoms using FAST and \"Time Saved is Brain Saved.\"    7.  I have recommended instituting regular cardiovascular exercise in the form of walking, biking or swimming 30-40 minutes at a time at least 3-4 times per week.    Greater than 25 minutes of a 30-minute, spent direct face-to-face education & counseling regarding aforementioned measures and the follow-up plan of care.  We discussed medications, testing and the impact these have on their overall health status.  He understands compliance with the prescribed regimen of therapy.          Inder Scales PA-C  12/11/20  13:13 CST    "

## 2021-02-17 ENCOUNTER — HOSPITAL ENCOUNTER (OUTPATIENT)
Dept: SLEEP MEDICINE | Facility: HOSPITAL | Age: 71
End: 2021-02-17

## 2021-04-13 ENCOUNTER — OFFICE VISIT (OUTPATIENT)
Dept: NEUROLOGY | Facility: CLINIC | Age: 71
End: 2021-04-13

## 2021-04-13 VITALS
WEIGHT: 193.6 LBS | DIASTOLIC BLOOD PRESSURE: 72 MMHG | BODY MASS INDEX: 30.39 KG/M2 | OXYGEN SATURATION: 95 % | HEIGHT: 67 IN | SYSTOLIC BLOOD PRESSURE: 128 MMHG | HEART RATE: 62 BPM

## 2021-04-13 DIAGNOSIS — G47.30 OBSERVED SLEEP APNEA: Primary | ICD-10-CM

## 2021-04-13 DIAGNOSIS — G89.29 CHRONIC LEFT-SIDED LOW BACK PAIN WITHOUT SCIATICA: ICD-10-CM

## 2021-04-13 DIAGNOSIS — I63.9 RIGHT BASAL GANGLIA EMBOLIC STROKE (HCC): ICD-10-CM

## 2021-04-13 DIAGNOSIS — M54.50 CHRONIC LEFT-SIDED LOW BACK PAIN WITHOUT SCIATICA: ICD-10-CM

## 2021-04-13 DIAGNOSIS — I65.1 BASILAR ARTERY STENOSIS: ICD-10-CM

## 2021-04-13 PROCEDURE — 99214 OFFICE O/P EST MOD 30 MIN: CPT | Performed by: PHYSICIAN ASSISTANT

## 2021-04-13 NOTE — PROGRESS NOTES
Neurology Progress Note      Chief Complaint:    Remote right basal ganglia CVA, 2019  Hypertension  Dysphagia  Left facial weakness  Dizziness  Observed sleep apnea  Daytime hypersomnolence    Subjective     Subjective:  The patient returns to clinic today for follow-up evaluation of obstructive sleep apnea, daytime hypersomnolence and remote stroke.  The patient continues to be managed with Plavix 75 mg daily aspirin 81 mg daily and atorvastatin 80 mg daily.  The patient is reporting ongoing fatigue and symptoms of obstructive sleep apnea, but has never followed through with polysomnography.  He is open to entertaining a home sleep study given the increased risk of stroke with untreated, undiagnosed obstructive apnea.  In addition, the patient is having ongoing difficulties with mobility secondary to chronic low back pain.  He is interested in pursuing outpatient physical therapy.      Past Medical History:   Diagnosis Date   • Coronary artery disease    • Hyperlipidemia    • Hypertension    • Myocardial infarction (CMS/HCC)    • Stroke (CMS/HCC)      Past Surgical History:   Procedure Laterality Date   • APPENDECTOMY     • CAROTID ENDARTERECTOMY     • CHOLECYSTECTOMY     • COLON SURGERY     • GTUBE INSERTION  2020     History reviewed. No pertinent family history.  Social History     Tobacco Use   • Smoking status: Former Smoker     Quit date: 10/27/2019     Years since quittin.4   • Smokeless tobacco: Never Used   Substance Use Topics   • Alcohol use: No   • Drug use: No       Medications:  Current Outpatient Medications   Medication Sig Dispense Refill   • amLODIPine (NORVASC) 10 MG tablet Take 10 mg by mouth Daily.     • aspirin 81 MG chewable tablet Chew 81 mg Daily.     • atorvastatin (LIPITOR) 80 MG tablet Take 80 mg by mouth Daily.     • carvedilol (COREG) 6.25 MG tablet Take 1 tablet by mouth 2 (two) times a day.     • cloNIDine (CATAPRES) 0.1 MG tablet Take 1 tablet by mouth 2 (Two)  Times a Day As Needed for High Blood Pressure (Systolic blood pressure over 160). 10 tablet 0   • clopidogrel (PLAVIX) 75 MG tablet Take 1 tablet by mouth Daily.     • losartan-hydrochlorothiazide (HYZAAR) 100-25 MG per tablet Take 1 tablet by mouth Daily.       No current facility-administered medications for this visit.       Allergies:    Lisinopril    Review of Systems:   -A 14 point review of systems is completed and is negative.      Objective      Vital Signs       Physical Exam:    General Exam:  Head:  Normocephalic, atraumatic.  HEENT: PERRLA.  Full EOM.  Neck:  No lymphadenopathy, thyromegaly or bruit.  Cardiac:  Regular rate and rhythm.  Normal S1, S2.  No murmur, rub or gallop.  Lungs:  Clear to auscultation bilaterally.  No wheeze, rales or rhonchi.  Abdomen:  Non-tender, Non-distended.  Bowel sounds normoactive.  Extremities: Full peripheral pulses.  No clubbing, cyanosis or edema.  Skin: No ulceration, breakdown or rash.      Neurologic Exam:  Mental Status:    -Awake. Alert. Oriented to person, place & time.  -No word finding difficulties.  -No aphasia.  -No dysarthria.  -Follows simple commands.     CN II:  Full visual fields with confrontation.  Pupils equally reactive to light.  CN III, IV, VI:  Extraocular muscles function intact with no nystagmus.  CN V:  Facial sensory is symmetric.  CN VII:  Facial motor symmetric.  CN VIII:  Gross hearing intact bilaterally.  CN IX/X:  Palate elevates symmetrically.  CN XI:  Shoulder shrug symmetric.  CN XII:  Tongue is midline on protrusion.     Motor: (strength out of 5:  1= minimal movement, 2 = movement in plane of gravity, 3 = movement against gravity, 4 = movement against some resistance, 5 = full strength)     -5/5 in bilateral biceps, triceps, brachioradialis, wrist extensors and intrinsic muscles of the hand.    -5/5 in bilateral hip flexors, quadriceps, hamstrings, gastrocsoleus complex, anterior tibialis and extensor hallucis longus.       Deep  "Tendon Reflexes:  -Right              Biceps: 2+         Triceps: 2+      Brachioradialis: 2+              Patella: 2+       Ankle: 2+           -Left              Biceps: 2+         Triceps: 2+      Brachioradialis: 2+              Patella: 2+       Ankle: 2+             Tone (Modified Melisa Scale):  No appreciable increase in tone or rigidity noted.     Sensory:  -Intact to light touch, pinprick BUE (C5-T1) and BLE (L2-S1).     Coordination:  -Finger to nose intact BUEs  -Heel to shin intact BLEs  -No ataxia     Gait  -No signs of ataxia  -ambulates unassisted       Results Review:    I reviewed the patient's new clinical results and findings.      No components found for: A1C  Lab Results   Component Value Date    HDL 28 (L) 11/14/2019    LDL 86 11/14/2019     No components found for: B12  No results found for: TSH    Assessment/Plan     Impression:  1.  Right basal ganglia CVA, November 2019  2.  Low back pain  3.  Observed sleep apnea  4.  Daytime hypersomnolence  5.  Dyslipidemia, mixed  6.  Hypertension, essential  7.  Vertebrobasilar artery stenosis  8.  Abdominal aortic aneurysm (both 7 and 8 followed by vascular surgery)      Plan:  1.  Continue Plavix 75 mg daily and aspirin 81 mg daily as dual antiplatelet therapy for secondary stroke prevention.     2.    Continue with regular surveillance through vascular surgery for his vertebrobasilar artery stenosis     3.  Continue atorvastatin with target LDL less than 70.     4.    Home sleep study.     5.  Continue to follow with PCP for other secondary stroke prevention measures and see neurology as needed.     6.  Patient is counseled on stroke signs and symptoms using FAST and \"Time Saved is Brain Saved.\"     7.  I have recommended instituting regular cardiovascular exercise in the form of walking, biking or swimming 30-40 minutes at a time at least 3-4 times per week.    8.  I did write for physical therapy for the patient's chronic low back " pain.        Inder Scales PA-C  04/21/21  15:57 CDT      Greater than 25 minutes spent preparing the patient's visit in chart, reviewing past history and diagnostic studies including imaging and laboratory evaluation, obtaining clinical history, performing physical examination, and counseling the patient on the prescribed plan of therapy and care, ordering diagnostic testing, making appropriate referrals, documenting the encounter and interpretation of results and coordination of care.

## 2021-05-03 ENCOUNTER — TELEPHONE (OUTPATIENT)
Dept: OTHER | Age: 71
End: 2021-05-03

## 2021-05-03 ENCOUNTER — TELEPHONE (OUTPATIENT)
Dept: VASCULAR SURGERY | Facility: CLINIC | Age: 71
End: 2021-05-03

## 2021-05-03 NOTE — TELEPHONE ENCOUNTER
Left message reminding Mr Delong of his appointments for Tuesday, May 4th, 2021. Reminded Mr Delong to arrive at the [facility] at 9 am for 930 am testing with nothing to eat or drink after midnight and follow up afterwards at 215 pm with Dr Kat. Also advised Mr Delong if he had any questions, concerns or needs to reschedule to please call the office at [phone].

## 2021-05-04 ENCOUNTER — HOSPITAL ENCOUNTER (OUTPATIENT)
Dept: ULTRASOUND IMAGING | Facility: HOSPITAL | Age: 71
Discharge: HOME | End: 2021-05-04

## 2021-05-04 ENCOUNTER — OFFICE VISIT (OUTPATIENT)
Dept: VASCULAR SURGERY | Facility: CLINIC | Age: 71
End: 2021-05-04

## 2021-05-04 VITALS
HEIGHT: 66 IN | OXYGEN SATURATION: 95 % | WEIGHT: 189 LBS | SYSTOLIC BLOOD PRESSURE: 126 MMHG | BODY MASS INDEX: 30.37 KG/M2 | HEART RATE: 72 BPM | DIASTOLIC BLOOD PRESSURE: 72 MMHG

## 2021-05-04 DIAGNOSIS — E78.49 OTHER HYPERLIPIDEMIA: ICD-10-CM

## 2021-05-04 DIAGNOSIS — I65.23 BILATERAL CAROTID ARTERY STENOSIS: ICD-10-CM

## 2021-05-04 DIAGNOSIS — I73.9 PAD (PERIPHERAL ARTERY DISEASE) (HCC): ICD-10-CM

## 2021-05-04 DIAGNOSIS — Z13.6 SCREENING FOR AAA (ABDOMINAL AORTIC ANEURYSM): ICD-10-CM

## 2021-05-04 DIAGNOSIS — I73.9 PAD (PERIPHERAL ARTERY DISEASE) (HCC): Primary | ICD-10-CM

## 2021-05-04 DIAGNOSIS — I10 ESSENTIAL HYPERTENSION: ICD-10-CM

## 2021-05-04 PROCEDURE — 93880 EXTRACRANIAL BILAT STUDY: CPT | Performed by: SURGERY

## 2021-05-04 PROCEDURE — 99214 OFFICE O/P EST MOD 30 MIN: CPT | Performed by: SURGERY

## 2021-05-04 PROCEDURE — 76775 US EXAM ABDO BACK WALL LIM: CPT

## 2021-05-04 PROCEDURE — 93923 UPR/LXTR ART STDY 3+ LVLS: CPT

## 2021-05-04 PROCEDURE — 93923 UPR/LXTR ART STDY 3+ LVLS: CPT | Performed by: SURGERY

## 2021-05-04 PROCEDURE — 93880 EXTRACRANIAL BILAT STUDY: CPT

## 2021-05-04 NOTE — PROGRESS NOTES
"5/4/2021       Tim Peguero MD  [address] [place] [id number]    Daniele Delong  1950    Chief Complaint   Patient presents with   • Follow-up     1 year f/u with us carotids, us aorta and ABIs. Pt denies any new or worsening abdominal/back pain.  Pt states that his PCP stopped his Plavix. Last seen in the office on 5/26/20.     • Former Smoker     Pt is a former smoker that stopped in 2019       Dear Tim Peguero MD   HPI  I had the pleasure of seeing your patient Daniele Delong in the office today.    As you recall, Daniele Delong is a 71 y.o.  male who you are currently following for routine health maintenance.  He does have a history of stroke with dizziness and blurred vision.  He denies any unilateral weakness.   He was a previous smoker but quit in 2019.  He does have an ascending thoracic aortic aneurysm and is following with Dr. Mcginnis.  He is maintained on aspirin and Lipitor.  He did have noninvasive testing performed, which I did review in office.    Review of Systems   Constitutional: Negative.    HENT: Negative.    Eyes: Negative.    Respiratory: Negative.    Cardiovascular: Negative.    Gastrointestinal: Negative.    Endocrine: Negative.    Genitourinary: Negative.    Musculoskeletal: Negative.    Skin: Negative.    Allergic/Immunologic: Negative.    Neurological: Negative.    Hematological: Negative.    Psychiatric/Behavioral: Negative.    All other systems reviewed and are negative.       /72   Pulse 72   Ht 167.6 cm (66\")   Wt 85.7 kg (189 lb)   SpO2 95%   BMI 30.51 kg/m²    Physical Exam  Vitals and nursing note reviewed.   Constitutional:       Appearance: Normal appearance. He is well-developed. He is obese.   HENT:      Head: Normocephalic and atraumatic.   Eyes:      General: No scleral icterus.     Pupils: Pupils are equal, round, and reactive to light.   Neck:      Thyroid: No thyromegaly.   Cardiovascular:      Rate and Rhythm: Normal rate and " regular rhythm.      Heart sounds: Normal heart sounds.   Pulmonary:      Effort: Pulmonary effort is normal.      Breath sounds: Normal breath sounds.   Abdominal:      General: Bowel sounds are normal.      Palpations: Abdomen is soft.   Musculoskeletal:         General: Normal range of motion.      Cervical back: Normal range of motion and neck supple.   Skin:     General: Skin is warm and dry.   Neurological:      General: No focal deficit present.      Mental Status: He is alert and oriented to person, place, and time.   Psychiatric:         Mood and Affect: Mood normal.         Behavior: Behavior normal.         Thought Content: Thought content normal.         Judgment: Judgment normal.       Diagnostic Data:  US Carotid Bilateral    Result Date: 5/4/2021  Narrative: History: Carotid occlusive disease      Impression: Impression: 1. There is less than 50% stenosis of the right internal carotid artery. 2. There is less than 50% stenosis of the left internal carotid artery. 3. Antegrade flow is demonstrated in bilateral vertebral arteries.  Comments: Bilateral carotid vertebral arterial duplex scan was performed.  Grayscale imaging shows intimal thickening and calcified elements at the carotid bifurcation. The right internal carotid artery peak systolic velocity is 82.8 cm/sec. The end-diastolic velocity is 26.3 cm/sec. The right ICA/CCA ratio is approximately 1.3 . These findings correlate with less than 50% stenosis of the right internal carotid artery.  Grayscale imaging shows intimal thickening and calcified elements at the carotid bifurcation. The left internal carotid artery peak systolic velocity is 109.3 cm/sec. The end-diastolic velocity is 34.9 cm/sec. The left ICA/CCA ratio is approximately 1.3 . These findings correlate with less than 50% stenosis of the left internal carotid artery.  Antegrade flow is demonstrated in bilateral vertebral arteries.  This report was finalized on 05/04/2021 12:53 by   Colton Kat MD.    US Ankle / Brachial Indices Extremity Complete    Result Date: 5/4/2021  Narrative:  History: PAD  Comments: Bilateral lower extremity arterial with multi-level pulse volume recordings and segmental pressures were performed at rest and stress.  The right ankle/brachial index is 1.24. The waveforms are triphasic without dampening.These findings are consistent with no significant arterial insufficiency of the right lower extremity at rest.  The postexercise ABIs 1.3.  The left ankle/brachial index is 1.04. The waveforms are triphasic without dampening. These findings are consistent with no significant arterial insufficiency of the left lower extremity at rest.    The postexercise ABIs 1.2.      Impression: Impression: 1. No significant arterial insufficiency of the right lower extremity at rest. 2. No significant arterial insufficiency of the left lower extremity at rest.   This report was finalized on 05/04/2021 12:49 by Dr. Colton Kat MD.    US Aorta Limited    Result Date: 5/4/2021  Narrative: ULTRASOUND AORTA 5/4/2021 9:19 AM CDT  HISTORY: AAA screening  COMPARISON: None  TECHNIQUE: Transverse and longitudinal sonographic images of the abdominal aorta were obtained.  FINDINGS:  The proximal aorta measures 2.5 x 2.7 cm. The mid aorta measures 2.2 x 2.4 cm. The distal aorta measures 1.2 x 1.4 cm. The aortoiliac bifurcation is normal in appearance.      Impression: 1. No aneurysmal dilation of the abdominal aorta.  This report was finalized on 05/04/2021 10:42 by Dr Efraín Richey, .         Patient Active Problem List   Diagnosis   • Paresthesias   • Essential hypertension   • Oropharyngeal dysphagia   • Other hyperlipidemia   • Thoracic aortic aneurysm without rupture (CMS/HCC)        Diagnosis Plan   1. PAD (peripheral artery disease) (CMS/[facility])  US Ankle / Brachial Indices Extremity Complete   2. Bilateral carotid artery stenosis  US Carotid Bilateral   3. Essential hypertension     4.  Other hyperlipidemia         Plan: After thoroughly evaluating Daniele Delong, I believe the best course of action is to remain conservative from vascular surgery standpoint.  Currently he is doing well denies any strokelike symptoms.  I did review his testing which shows less than 50% carotid stenosis bilaterally and ABIs are within normal limits.  His ultrasound shows no evidence of abdominal aortic aneurysm.  He does continue to follow with Dr. Mcginnis for an ascending thoracic aortic aneurysm.  I will see him back in 1 year with repeat noninvasive testing for continued surveillance, including ABIs and a carotid duplex.  I did discuss vascular risk factors as it pertain to progression of vascular disease including controlling his hypertension and hyperlipidemia.  I did discuss vascular risk factors as they pertain to the progression of vascular disease including controlling hypertension and hyperlipidemia.  His blood pressure is stable on his current medication regimen.  He is maintained on Lipitor for his hyperlipidemia.  Patient's (Body mass index is 30.51 kg/m².) indicates that they are obese (BMI >30) with obesity-related health conditions that include hypertension, coronary heart disease, dyslipidemias and peripheral vascular disease . Obesity is unchanged. BMI is is above average; BMI management plan is completed. We discussed portion control and increasing exercise. The patient is to continue taking their medications as previously discussed.   This was all discussed in full with complete understanding.  Thank you for allowing me to participate in the care of your patient.  Please do not hesitate to call with any questions or concerns.  We will keep you aware of any further encounters with Daniele Delong.        Sincerely yours,         KWABENA Larios Jonathan E, MD

## 2021-05-13 ENCOUNTER — HOSPITAL ENCOUNTER (OUTPATIENT)
Dept: SLEEP MEDICINE | Facility: HOSPITAL | Age: 71
Discharge: HOME | End: 2021-05-13
Admitting: PHYSICIAN ASSISTANT

## 2021-05-13 DIAGNOSIS — G47.30 OBSERVED SLEEP APNEA: ICD-10-CM

## 2021-05-13 PROCEDURE — 95806 SLEEP STUDY UNATT&RESP EFFT: CPT | Performed by: PSYCHIATRY & NEUROLOGY

## 2021-05-13 PROCEDURE — 95806 SLEEP STUDY UNATT&RESP EFFT: CPT

## 2021-05-21 ENCOUNTER — TRANSCRIBE ORDERS (OUTPATIENT)
Dept: SLEEP MEDICINE | Facility: HOSPITAL | Age: 71
End: 2021-05-21

## 2021-05-21 DIAGNOSIS — G47.33 OBSTRUCTIVE SLEEP APNEA, ADULT: Primary | ICD-10-CM

## 2021-05-24 ENCOUNTER — TELEPHONE (OUTPATIENT)
Dept: NEUROLOGY | Facility: HOSPITAL | Age: 71
End: 2021-05-24

## 2021-05-24 ENCOUNTER — HOSPITAL ENCOUNTER (OUTPATIENT)
Dept: SLEEP MEDICINE | Facility: HOSPITAL | Age: 71
Discharge: HOME | End: 2021-05-24

## 2021-05-24 ENCOUNTER — TRANSCRIBE ORDERS (OUTPATIENT)
Dept: LAB | Facility: HOSPITAL | Age: 71
End: 2021-05-24

## 2021-05-24 ENCOUNTER — LAB (OUTPATIENT)
Dept: LAB | Facility: HOSPITAL | Age: 71
End: 2021-05-24

## 2021-05-24 VITALS
HEIGHT: 66 IN | RESPIRATION RATE: 16 BRPM | DIASTOLIC BLOOD PRESSURE: 74 MMHG | BODY MASS INDEX: 30.05 KG/M2 | SYSTOLIC BLOOD PRESSURE: 134 MMHG | WEIGHT: 187 LBS | OXYGEN SATURATION: 97 % | HEART RATE: 75 BPM

## 2021-05-24 DIAGNOSIS — Z01.818 PREOPERATIVE TESTING: Primary | ICD-10-CM

## 2021-05-24 DIAGNOSIS — G47.33 OBSTRUCTIVE SLEEP APNEA, ADULT: ICD-10-CM

## 2021-05-24 LAB — SARS-COV-2 RNA PNL SPEC NAA+PROBE: NOT DETECTED

## 2021-05-24 PROCEDURE — 95811 POLYSOM 6/>YRS CPAP 4/> PARM: CPT | Performed by: PSYCHIATRY & NEUROLOGY

## 2021-05-24 PROCEDURE — C9803 HOPD COVID-19 SPEC COLLECT: HCPCS | Performed by: PSYCHIATRY & NEUROLOGY

## 2021-05-24 PROCEDURE — 95811 POLYSOM 6/>YRS CPAP 4/> PARM: CPT

## 2021-05-24 PROCEDURE — 87635 SARS-COV-2 COVID-19 AMP PRB: CPT | Performed by: PSYCHIATRY & NEUROLOGY

## 2021-06-02 ENCOUNTER — TELEPHONE (OUTPATIENT)
Dept: NEUROLOGY | Facility: HOSPITAL | Age: 71
End: 2021-06-02

## 2021-06-02 NOTE — TELEPHONE ENCOUNTER
Spoke to Mr. Delong and went over the results of his CPAP trial which as performed 05/24/2021.  Questions answered.  Mr. Delong chose [facility] as his DME.  Orders, documentation and insurance information will be sent today.  Mr. Delong will be getting notification from the sleep clinic for a compliance appointment.

## 2021-06-10 DIAGNOSIS — M54.50 CHRONIC LEFT-SIDED LOW BACK PAIN WITHOUT SCIATICA: Primary | ICD-10-CM

## 2021-06-10 DIAGNOSIS — G89.29 CHRONIC LEFT-SIDED LOW BACK PAIN WITHOUT SCIATICA: Primary | ICD-10-CM

## 2021-06-10 NOTE — PROGRESS NOTES
[facility] said they received an order for PT dated in April.  They need a new order, it has to less than 30 days old.

## 2021-08-11 ENCOUNTER — APPOINTMENT (OUTPATIENT)
Dept: CT IMAGING | Facility: HOSPITAL | Age: 71
End: 2021-08-11

## 2021-08-11 ENCOUNTER — HOSPITAL ENCOUNTER (OUTPATIENT)
Dept: CT IMAGING | Facility: HOSPITAL | Age: 71
Discharge: HOME | End: 2021-08-11
Admitting: THORACIC SURGERY (CARDIOTHORACIC VASCULAR SURGERY)

## 2021-08-11 ENCOUNTER — TELEPHONE (OUTPATIENT)
Dept: CARDIAC SURGERY | Facility: CLINIC | Age: 71
End: 2021-08-11

## 2021-08-11 DIAGNOSIS — I71.20 THORACIC AORTIC ANEURYSM WITHOUT RUPTURE (HCC): ICD-10-CM

## 2021-08-11 LAB — CREAT BLDA-MCNC: 1.3 MG/DL (ref 0.6–1.3)

## 2021-08-11 PROCEDURE — 71275 CT ANGIOGRAPHY CHEST: CPT

## 2021-08-11 PROCEDURE — 0 IOPAMIDOL PER 1 ML: Performed by: THORACIC SURGERY (CARDIOTHORACIC VASCULAR SURGERY)

## 2021-08-11 PROCEDURE — 82565 ASSAY OF CREATININE: CPT

## 2021-08-11 RX ADMIN — IOPAMIDOL 100 ML: 755 INJECTION, SOLUTION INTRAVENOUS at 14:35

## 2021-08-11 NOTE — TELEPHONE ENCOUNTER
Patient showed up for Office visit today 8/11, but did not show up for CTA that was scheduled prior to appt. Patient was called to inform him of CT prior to appt several times and an appt reminder was also mailed to him. I was able to have test worked back in for this after, but pt will miss appt. They are wondering If they could be called with results of ct on if aneurysm had changed. Informed I could not promise this but a message would be sent. He verbalized understanding.

## 2021-08-17 NOTE — TELEPHONE ENCOUNTER
Dr. Mcginnis reviewed CTA and called pt. Aneurysm 4.4 cm, previously 4.1. Discussed with patient, no chest pain. Plan to see in 6 months with CTA.

## 2022-01-31 ENCOUNTER — TELEPHONE (OUTPATIENT)
Dept: CARDIAC SURGERY | Facility: CLINIC | Age: 72
End: 2022-01-31

## 2022-01-31 DIAGNOSIS — I71.20 THORACIC AORTIC ANEURYSM WITHOUT RUPTURE: Primary | ICD-10-CM

## 2022-01-31 NOTE — TELEPHONE ENCOUNTER
Pt calling re: appt for Dr Mcginnis on Wednesday.  He has not heard yet re: appt for CT.  States last time one did not get sched and he had to go back and get one.  Pt calling to check on this.  Informed pt it was not sched yet but I would have someone check if ins had approved this yet and call him with appt.  Can reach him at #[phone]/kah

## 2022-02-02 ENCOUNTER — HOSPITAL ENCOUNTER (OUTPATIENT)
Dept: CT IMAGING | Facility: HOSPITAL | Age: 72
Discharge: HOME | End: 2022-02-02
Admitting: NURSE PRACTITIONER

## 2022-02-02 ENCOUNTER — OFFICE VISIT (OUTPATIENT)
Dept: CARDIAC SURGERY | Facility: CLINIC | Age: 72
End: 2022-02-02

## 2022-02-02 VITALS
BODY MASS INDEX: 31.82 KG/M2 | HEART RATE: 69 BPM | HEIGHT: 66 IN | SYSTOLIC BLOOD PRESSURE: 130 MMHG | OXYGEN SATURATION: 96 % | WEIGHT: 198 LBS | DIASTOLIC BLOOD PRESSURE: 80 MMHG

## 2022-02-02 DIAGNOSIS — I71.20 THORACIC AORTIC ANEURYSM WITHOUT RUPTURE: Primary | ICD-10-CM

## 2022-02-02 DIAGNOSIS — I71.20 THORACIC AORTIC ANEURYSM WITHOUT RUPTURE: ICD-10-CM

## 2022-02-02 LAB — CREAT BLDA-MCNC: 1.1 MG/DL (ref 0.6–1.3)

## 2022-02-02 PROCEDURE — 71275 CT ANGIOGRAPHY CHEST: CPT

## 2022-02-02 PROCEDURE — 82565 ASSAY OF CREATININE: CPT

## 2022-02-02 PROCEDURE — 99213 OFFICE O/P EST LOW 20 MIN: CPT | Performed by: THORACIC SURGERY (CARDIOTHORACIC VASCULAR SURGERY)

## 2022-02-02 PROCEDURE — 0 IOPAMIDOL PER 1 ML: Performed by: NURSE PRACTITIONER

## 2022-02-02 RX ORDER — LANOLIN ALCOHOL/MO/W.PET/CERES
1000 CREAM (GRAM) TOPICAL DAILY
COMMUNITY

## 2022-02-02 RX ADMIN — IOPAMIDOL 150 ML: 755 INJECTION, SOLUTION INTRAVENOUS at 09:02

## 2022-02-07 DIAGNOSIS — I71.20 THORACIC AORTIC ANEURYSM WITHOUT RUPTURE: Primary | ICD-10-CM

## 2022-02-23 ENCOUNTER — TELEPHONE (OUTPATIENT)
Dept: NEUROLOGY | Facility: CLINIC | Age: 72
End: 2022-02-23

## 2022-02-23 NOTE — TELEPHONE ENCOUNTER
Patient called in stating he threw out his back again and about a year ago suzie sent him to PT and the PT office told patient that he is needing a ref before they can set him up?        Please advise        Call back   [phone]

## 2022-02-23 NOTE — TELEPHONE ENCOUNTER
Explained that he was last seen in April and missed an appointment on 8-6.  He said he saw his PCP about 2 weeks ago, his back was sore at that time.  Suggested he contact his PCP and ask them to order therapy.

## 2022-03-12 NOTE — PROGRESS NOTES
Chief Complaint   Patient presents with   • Thoracic Aneurysm     Pt is here for follow up w/CT          Subjective     History of Present Illness  Mr. Delong is a 70-year-old male with a past medical history of known coronary artery disease, hyperlipidemia, previous myocardial infarction, and stroke who presents with incidental finding of a thoracic ascending aortic aneurysm.  He was seen by Dr. Kat after his most recent stroke work-up with incidental finding of thoracic aortic aneurysm.  He denies a family history of aneurysmal disease, sudden death in family, or a significant valvular family history.  He has had no chest pain.  He does have shortness of breath but this is been also stable for some time.  No major events since last office visit.  He now uses a CPAP device.No new chest pain and shortness of breath is stable to perhaps even a little better.      Review of Systems   Constitutional: Positive for fatigue. Negative for activity change, appetite change, chills, diaphoresis, fever and unexpected weight change.   HENT: Negative for dental problem, hearing loss, nosebleeds, sore throat, trouble swallowing and voice change.    Eyes: Negative for photophobia, redness and visual disturbance.   Respiratory: Positive for shortness of breath. Negative for apnea, cough, chest tightness, wheezing and stridor.    Cardiovascular: Negative for chest pain, palpitations and leg swelling.   Gastrointestinal: Negative for abdominal distention, abdominal pain, blood in stool, constipation, diarrhea, nausea and vomiting.   Endocrine: Negative for cold intolerance, heat intolerance, polyphagia and polyuria.   Genitourinary: Negative for decreased urine volume, difficulty urinating, dysuria, flank pain, frequency, hematuria and urgency.   Musculoskeletal: Positive for arthralgias. Negative for back pain, gait problem, joint swelling, myalgias and neck pain.   Skin: Negative for pallor, rash and wound.  "  Allergic/Immunologic: Negative for immunocompromised state.   Neurological: Positive for light-headedness. Negative for dizziness, tremors, seizures, syncope, speech difficulty, weakness, numbness and headaches.   Hematological: Bruises/bleeds easily.   Psychiatric/Behavioral: Negative for confusion, sleep disturbance and suicidal ideas. The patient is not nervous/anxious.           Social History     Tobacco Use   • Smoking status: Former Smoker     Quit date: 10/27/2019     Years since quittin.3   • Smokeless tobacco: Never Used   Substance Use Topics   • Alcohol use: No   • Drug use: No     Current Outpatient Medications   Medication Sig Dispense Refill   • amLODIPine (NORVASC) 10 MG tablet Take 10 mg by mouth Daily.     • aspirin 81 MG chewable tablet Chew 81 mg Daily.     • atorvastatin (LIPITOR) 80 MG tablet Take 80 mg by mouth Daily.     • carvedilol (COREG) 6.25 MG tablet Take 1 tablet by mouth 2 (two) times a day.     • cloNIDine (CATAPRES) 0.1 MG tablet Take 1 tablet by mouth 2 (Two) Times a Day As Needed for High Blood Pressure (Systolic blood pressure over 160). 10 tablet 0   • losartan-hydrochlorothiazide (HYZAAR) 50-12.5 MG per tablet Take 1 tablet by mouth Daily.     • vitamin B-12 (CYANOCOBALAMIN) 1000 MCG tablet Take 1,000 mcg by mouth Daily.       No current facility-administered medications for this visit.     Allergies:  Lisinopril    Objective      Vital Signs  Visit Vitals  /80 (BP Location: Right arm, Patient Position: Sitting, Cuff Size: Adult)   Pulse 69   Ht 167.6 cm (66\")   Wt 89.8 kg (198 lb)   SpO2 96%   BMI 31.96 kg/m²         Physical Exam   Constitutional: He is oriented to person, place, and time. He appears well-developed.   HENT:   Head: Normocephalic and atraumatic.   Eyes: Pupils are equal, round, and reactive to light.   Neck: No JVD present. No tracheal deviation present. No thyromegaly present.   Cardiovascular: Normal rate, regular rhythm and normal heart sounds. " "Exam reveals no gallop and no friction rub.   No murmur heard.  Pulmonary/Chest: Effort normal and breath sounds normal. No respiratory distress. He has no wheezes. He has no rales. He exhibits no tenderness.   Abdominal: Soft. He exhibits no distension. There is no abdominal tenderness.   Musculoskeletal: Normal range of motion.   Lymphadenopathy:     He has no cervical adenopathy.   Neurological: He is alert and oriented to person, place, and time. No cranial nerve deficit.   Skin: Skin is warm and dry.       Results Review:     Interpretation Summary     · Left ventricular systolic function is normal. Estimated EF appears to be in the range of 61 - 65%.  · Left ventricular wall thickness is consistent with moderate concentric hypertrophy.  · No evidence of a patent foramen ovale.      Patient Hx Of Height, Weight, and Vitals     Height Weight BSA (Calculated - sq m) BMI (kg/m2) Pulse BP   170.2 cm (67\") 81.9 kg (180 lb 8.9 oz) 1.94 sq meters 28.34 68 163/87   Reason for Exam     Cardiac source of emboli   Cardiac History     Diagnosis Date Comment Source   Coronary artery disease   Provider   Hyperlipidemia   Provider   Hypertension   Provider   Study Description     2-D Echo performed with color flow and Doppler. The study is technically adequate for diagnosis.   Echocardiogram Findings     Left Ventricle Left ventricular systolic function is normal. Estimated EF appears to be in the range of 61 - 65%. Normal left ventricular cavity size noted. All left ventricular wall segments contract normally. Left ventricular wall thickness is consistent with moderate concentric hypertrophy.   Right Ventricle Normal right ventricular cavity size and systolic function noted.   Left Atrium Left atrial cavity size is borderline dilated. Left atrial volume is borderline increased. No evidence of a patent foramen ovale. Saline test results are negative.   Right Atrium Normal right atrial size noted.   Aortic Valve The aortic " valve is structurally normal. No significant aortic valve regurgitation is present. No hemodynamically significant aortic valve stenosis is present.   Mitral Valve The mitral valve is normal in structure. Trace mitral valve regurgitation is present. No significant mitral valve stenosis is present.   Tricuspid Valve The tricuspid valve is normal. No evidence of tricuspid valve stenosis is present. Trace tricuspid valve regurgitation is present. Estimated right ventricular systolic pressure from tricuspid regurgitation is mildly elevated (35-45 mmHg).   Pulmonic Valve The pulmonic valve is not well visualized. The pulmonic valve is grossly normal in structure. There is no significant pulmonic valve stenosis present. There is no significant pulmonic valve regurgitation present.   Greater Vessels No dilation of the aortic root is present. No dilation of the sinuses of Valsalva is present.   Pericardium The pericardium is normal. There is no evidence of pericardial effusion.      LV Measurements     Dimensions   LVIDd 3.9 cm      IVSd 1.8 cm      FS 30.3 %      ESV(cubed) 19.9 ml      EDV(cubed) 58.9 ml      LVOT area 3.5 cm^2      LVOT diam 2.1 cm      Diastolic Filling   MV E max oswaldo 85.9 cm/sec      MV A max oswaldo 85.4 cm/sec      MV dec time 0.3 sec      MV E/A 1       LA Volume Index 30.4 mL/m2      Med Peak E' Oswaldo 7.2 cm/sec      Lat Peak E' Oswaldo 9 cm/sec      Avg E/e' ratio 10.6       Shunt Ratio   SV(LVOT) 134.4 ml       Dimensions   LVIDs 2.7 cm      LVPWd 1.5 cm      IVS/LVPW 1.3       LV Sys Vol (BSA corrected) 15.8 ml/m^2      LV Dillard Vol (BSA corrected) 40.3 ml/m^2      LV mass(C)d 259.6 grams      EDV(MOD-sp4) 78 ml      ESV(MOD-sp4) 30.6 ml      Systolic Function   SV(MOD-sp4) 47.4 ml      SI(MOD-sp4) 24.5 ml/m^2      EF(MOD-sp4) 60.8 %         LA Measurements     LA Dimensions   LA dimension 3.9 cm      LA volume 58.6 cm3      LA Volume Index 30.4 mL/m2         Aortic Valve Measurements     Stenosis   LVOT  diam 2.1 cm      LV V1 max 137 cm/sec      LV V1 max PG 7.5 mmHg      LV V1 mean PG 4 mmHg      LV V1 VTI 38.8 cm      Ao pk srikanth 178 cm/sec       Stenosis   Ao max PG 12.7 mmHg      Ao mean PG 7 mmHg      Ao V2 VTI 45.3 cm      NISA(I,D) 3 cm^2         Mitral Valve Measurements     Stenosis   MV dec slope 290 cm/sec^2      MV dec time 0.3 sec          Tricuspid Valve Measurements     Regurgitation   TR max srikanth 251 cm/sec      RVSP(TR) 35.2 mmHg      RAP systole 10 mmHg      PISA   TR max srikanth 251 cm/sec          Greater Vessels Measurements     Ao root diam 3.2 cm      Ao root area (BSA corrected) 1.7              Study Result    Narrative & Impression   CT ANGIOGRAM CHEST- 2/2/2022 8:50 AM CST      HISTORY: Thoracic aortic aneurysm; I71.2-Thoracic aortic aneurysm,  without rupture      COMPARISON: August 11, 2021     DOSE LENGTH PRODUCT: 210 mGy cm. Automated exposure control was also  utilized to decrease patient radiation dose.     TECHNIQUE: Helical tomographic images of the chest were obtained after  the administration of intravenous contrast following angiogram protocol.  Additionally, 3D and multiplanar reformatted images were provided.        FINDINGS:    Pulmonary arteries: There is adequate enhancement of the pulmonary  arteries to evaluate for central and segmental pulmonary emboli. There  are no filling defects within the main, lobar, segmental or visualized  subsegmental pulmonary arteries. The pulmonary arteries are within  normal limits for size.      Aorta and great vessels: The aorta is well visualized. The aorta is  42  x 42 mm in maximal dimension from outer wall to outer wall. There is no  evidence of dissection.. The great vessels are normal in appearance.     Visualized neck base: The imaged portion of the base of the neck appears  unremarkable.      Lungs: The lungs are clear. There is no mass, worrisome nodule, or  consolidation. No pleural effusion is seen. The trachea and bronchial  tree are  patent.      Heart: Cardiac silhouettes mildly enlarged. Coronary calcifications are  present.. There is no pericardial effusion.      Mediastinum and lymph nodes: No enlarged mediastinal, hilar, or axillary  lymph nodes are present.      Skeletal and soft tissues: The osseous structures of the thorax and  surrounding soft tissues demonstrate no acute process.     Upper abdomen: The imaged portion of the upper abdomen demonstrates no  acute process.      IMPRESSION:  1. Ascending aorta is approximately 42 x 42 mm from outer wall to outer  wall. This was described as 44 x 46 mm on prior exam August 11, 2021.  The descending thoracic aorta is unremarkable.  2. Coronary calcifications are present.     3 mild cardiomegaly.        This report was finalized on 02/02/2022 09:32 by Dr. Abdoul Sawyer MD.         CT scan of the chest obtained November 2, 2022 demonstrates an ascending aortic aneurysm measured in greatest dimension in the short axis 4.2 cm in size.  Its morphology is fusiform.  Previously, this measured 4.1 cm in size.  No evidence of dissection.    I reviewed the patient's new clinical results.  Discussed with patient      Assessment/Plan       Diagnoses and all orders for this visit:    1. Thoracic aortic aneurysm without rupture (HCC) (Primary)          I discussed the patients findings and my recommendations with patient.  At this time he potentially may have had some interval growth but it is within expected limits.  I did review once again the natural course history of aortic aneurysmal disease briefly.  Based on the specific size of his aneurysm today ongoing surveillance is recommended.  Reviewed signs and/or symptoms of acute aortic pathology and the need to present to the emergency department for further evaluation.  Lastly, we discussed the value of exercise while being mindful of a known aneurysm and the potential risk that high intensity, isometric, heavy lifting, or valsalva type exercises  presents.   Potential medical therapy including the use of a beta-blocker and perhaps other agents to accomplish strict control of pressure were discussed.     He is a non-smoker.    Patient's Body mass index is 31.96 kg/m². BMI is above normal parameters. Recommendations include: exercise counseling, nutrition counseling and referral to primary care.    I will plan to see him back in 1 year.

## 2022-04-27 ENCOUNTER — TELEPHONE (OUTPATIENT)
Dept: VASCULAR SURGERY | Facility: CLINIC | Age: 72
End: 2022-04-27

## 2022-04-27 NOTE — TELEPHONE ENCOUNTER
Spoke with Mr Delong reminding him of his appointment for Thursday, April 28th, 2022. [facility] for 10 o'clock testing and follow up afterwards at 130 pm with Dr Kat. Mr Delong confirmed he would be here.   
- - -

## 2022-04-28 ENCOUNTER — APPOINTMENT (OUTPATIENT)
Dept: ULTRASOUND IMAGING | Facility: HOSPITAL | Age: 72
End: 2022-04-28

## 2022-05-03 ENCOUNTER — TELEPHONE (OUTPATIENT)
Dept: VASCULAR SURGERY | Facility: CLINIC | Age: 72
End: 2022-05-03

## 2022-05-04 ENCOUNTER — HOSPITAL ENCOUNTER (OUTPATIENT)
Dept: ULTRASOUND IMAGING | Facility: HOSPITAL | Age: 72
Discharge: HOME | End: 2022-05-04

## 2022-05-04 ENCOUNTER — OFFICE VISIT (OUTPATIENT)
Dept: VASCULAR SURGERY | Facility: CLINIC | Age: 72
End: 2022-05-04

## 2022-05-04 VITALS
DIASTOLIC BLOOD PRESSURE: 76 MMHG | BODY MASS INDEX: 31.08 KG/M2 | HEIGHT: 67 IN | WEIGHT: 198 LBS | HEART RATE: 64 BPM | OXYGEN SATURATION: 96 % | SYSTOLIC BLOOD PRESSURE: 132 MMHG

## 2022-05-04 DIAGNOSIS — I65.23 BILATERAL CAROTID ARTERY STENOSIS: ICD-10-CM

## 2022-05-04 DIAGNOSIS — I73.9 PAD (PERIPHERAL ARTERY DISEASE): ICD-10-CM

## 2022-05-04 DIAGNOSIS — I65.23 BILATERAL CAROTID ARTERY STENOSIS: Primary | ICD-10-CM

## 2022-05-04 DIAGNOSIS — E78.49 OTHER HYPERLIPIDEMIA: ICD-10-CM

## 2022-05-04 DIAGNOSIS — I10 ESSENTIAL HYPERTENSION: ICD-10-CM

## 2022-05-04 PROCEDURE — 93880 EXTRACRANIAL BILAT STUDY: CPT | Performed by: SURGERY

## 2022-05-04 PROCEDURE — 99214 OFFICE O/P EST MOD 30 MIN: CPT | Performed by: NURSE PRACTITIONER

## 2022-05-04 PROCEDURE — 93880 EXTRACRANIAL BILAT STUDY: CPT

## 2022-05-04 PROCEDURE — 93923 UPR/LXTR ART STDY 3+ LVLS: CPT | Performed by: SURGERY

## 2022-05-04 PROCEDURE — 93923 UPR/LXTR ART STDY 3+ LVLS: CPT

## 2022-05-04 NOTE — PROGRESS NOTES
"5/4/2022       Tim Peguero MD  [address] [place], [place] [id number]    Daniele Delong  1950    Chief Complaint   Patient presents with   • Follow-up     1 Year Follow Up For Bilateral Carotid Artery Stenosis and Peripheral Artery Disease. Test [id number] US pad carotid bilateral and US pad ankle / brach ind ext comp. Patient denies any stroke like symptoms.    • Former Smoker     Patient is a Former Smoker    • Other     Patient states at times bilateral feet feel numb    • Med Management     Verbally verified medications with patient        Dear Tim Peguero MD   HPI  I had the pleasure of seeing your patient Daniele Delong in the office today.    As you recall, Daniele Delong is a 72 y.o.  male who you are currently following for routine health maintenance.  He does have a history of stroke with dizziness and blurred vision.  He was a previous smoker but quit in 2019.  He is followed by Dr. Mcginnis for an ascending aortic aneurysm.  He is maintained on aspirin and Lipitor.  He did have noninvasive testing performed today which I did review in office.     Review of Systems   Constitutional: Negative.    HENT: Negative.    Eyes: Negative.    Respiratory: Negative.    Cardiovascular: Negative.    Gastrointestinal: Negative.    Endocrine: Negative.    Genitourinary: Negative.    Musculoskeletal: Negative.    Skin: Negative.    Allergic/Immunologic: Negative.    Neurological: Positive for numbness (To his feet at times).   Hematological: Negative.    Psychiatric/Behavioral: Negative.    All other systems reviewed and are negative.       /76 (BP Location: Right arm, Patient Position: Sitting, Cuff Size: Adult)   Pulse 64   Ht 170.2 cm (67\")   Wt 89.8 kg (198 lb)   SpO2 96%   BMI 31.01 kg/m²    Physical Exam  Vitals and nursing note reviewed.   Constitutional:       Appearance: Normal appearance. He is well-developed. He is obese.   HENT:      Head: Normocephalic and atraumatic. "   Eyes:      General: No scleral icterus.     Pupils: Pupils are equal, round, and reactive to light.   Neck:      Thyroid: No thyromegaly.   Cardiovascular:      Rate and Rhythm: Normal rate and regular rhythm.      Pulses: Normal pulses.      Heart sounds: Normal heart sounds.   Pulmonary:      Effort: Pulmonary effort is normal.      Breath sounds: Normal breath sounds.   Abdominal:      General: Bowel sounds are normal.      Palpations: Abdomen is soft.   Musculoskeletal:         General: Normal range of motion.      Cervical back: Normal range of motion and neck supple.   Skin:     General: Skin is warm and dry.   Neurological:      General: No focal deficit present.      Mental Status: He is alert and oriented to person, place, and time.   Psychiatric:         Mood and Affect: Mood normal.         Behavior: Behavior normal.         Thought Content: Thought content normal.         Judgment: Judgment normal.       Diagnostic Data:  US Carotid Bilateral    Result Date: 5/4/2022  Narrative: History: Carotid occlusive disease      Impression: Impression: 1. There is less than 50% stenosis of the right internal carotid artery. 2. There is 50-69% stenosis of the left internal carotid artery. 3. Antegrade flow is demonstrated in bilateral vertebral arteries.  Comments: Bilateral carotid vertebral arterial duplex scan was performed.  Grayscale imaging shows intimal thickening and calcified elements at the carotid bifurcation. The right internal carotid artery peak systolic velocity is 63.3 cm/sec. The end-diastolic velocity is 21.3 cm/sec. The right ICA/CCA ratio is approximately 0.8 . These findings correlate with less than 50% stenosis of the right internal carotid artery.  Grayscale imaging shows intimal thickening and calcified elements at the carotid bifurcation. The left internal carotid artery peak systolic velocity is 149.5 cm/sec. The end-diastolic velocity is 46.9 cm/sec. The left ICA/CCA ratio is  approximately 2.0 . These findings correlate with 50-69% stenosis of the left internal carotid artery.  Antegrade flow is demonstrated in bilateral vertebral arteries. There is greater than 50% stenosis of the right external carotid artery. This report was finalized on 05/04/2022 13:30 by Dr. Colton Kat MD.    US Ankle / Brachial Indices Extremity Complete    Result Date: 5/4/2022  Narrative:  History: PAD  Comments: Bilateral lower extremity arterial with multi-level pulse volume recordings and segmental pressures were performed at rest and stress.  The right ankle/brachial index is 1.23. The waveforms are biphasic without dampening.These findings are consistent with no significant arterial insufficiency of the right lower extremity at rest.  The left ankle/brachial index is 0.99. The waveforms are biphasic without dampening. These findings are consistent with no significant arterial insufficiency of the left lower extremity at rest.      Impression: Impression: 1. No significant arterial insufficiency of the right lower extremity at rest. 2. No significant arterial insufficiency of the left lower extremity at rest.   This report was finalized on 05/04/2022 13:40 by Dr. Colton Kat MD.         Patient Active Problem List   Diagnosis   • Paresthesias   • Essential hypertension   • Oropharyngeal dysphagia   • Other hyperlipidemia   • Thoracic aortic aneurysm without rupture (HCC)        Diagnosis Plan   1. Bilateral carotid artery stenosis  US Carotid Bilateral   2. PAD (peripheral artery disease) (HCC)  US Ankle / Brachial Indices Extremity Complete   3. Essential hypertension     4. Other hyperlipidemia         Plan: After thoroughly evaluating Daniele Delong, I believe the best course of action is to remain conservative from vascular surgery standpoint.  Currently he is doing well denies any strokelike symptoms or claudication to his lower extremities.  I did review his testing which shows 50 to 69%  right carotid stenosis and less than 50% left carotid stenosis.  His ABIs are within normal limits.  We will see him back in 1 year with repeat noninvasive testing for continued surveillance, including ABIs and a carotid duplex.  I did discuss vascular risk factors as they pertain to the progression of vascular disease including controlling his hypertension and hyperlipidemia.  His blood pressure stable on his current medications.  He is maintained on Lipitor for his hyperlipidemia.   The patient is to continue taking their medications as previously discussed.   This was all discussed in full with complete understanding.  Thank you for allowing me to participate in the care of your patient.  Please do not hesitate to call with any questions or concerns.  We will keep you aware of any further encounters with Daniele Delong.        Sincerely yours,         KWABENA Larios Jonathan E, MD

## 2023-01-13 NOTE — NURSING NOTE
After admitting and charge nurse had finished pt's admission, nurse entered to assess pt.  Pt's family had arrived, bringing him [employer].  He was eating a hamburger upon nurse entering room.  Dysphagia screen performed.  Pt has facial asymmetry so he failed.  Pt stopped eating/drinking.  Education performed r/t reason for NPO status until SLP sees.  Pt verbalized understanding.  Aysha Bautista RN     .

## 2023-02-01 ENCOUNTER — HOSPITAL ENCOUNTER (OUTPATIENT)
Dept: CT IMAGING | Facility: HOSPITAL | Age: 73
Discharge: HOME | End: 2023-02-01
Admitting: NURSE PRACTITIONER
Payer: MEDICARE

## 2023-02-01 DIAGNOSIS — I71.20 THORACIC AORTIC ANEURYSM WITHOUT RUPTURE: ICD-10-CM

## 2023-02-01 LAB — CREAT BLDA-MCNC: 1.2 MG/DL (ref 0.6–1.3)

## 2023-02-01 PROCEDURE — 0 IOPAMIDOL PER 1 ML: Performed by: NURSE PRACTITIONER

## 2023-02-01 PROCEDURE — 71275 CT ANGIOGRAPHY CHEST: CPT

## 2023-02-01 PROCEDURE — 82565 ASSAY OF CREATININE: CPT

## 2023-02-01 RX ADMIN — IOPAMIDOL 89 ML: 755 INJECTION, SOLUTION INTRAVENOUS at 09:49

## 2023-02-03 ENCOUNTER — TELEPHONE (OUTPATIENT)
Dept: CARDIAC SURGERY | Facility: CLINIC | Age: 73
End: 2023-02-03
Payer: MEDICARE

## 2023-02-03 NOTE — TELEPHONE ENCOUNTER
Attempted to call pt re: appt cx'd 2-1-23 due to weather/road conditions to inform pt that Dr Mcginnis has reviewed his testing and will be calling him with specific info but no appt needed at this time and his next follow up with imaging will be in 1 year.  No answer.  VM message left for pt to call back.  If pt calls back, can relay this info to pt and document in this message/kahm

## 2023-02-22 NOTE — TELEPHONE ENCOUNTER
Pt calling back to discuss results with Dr. Mcginnis. Informed him that he is on the list to call, but Dr. Mcginnis is seeing patients right now. Informed pt of his follow-up visit next year and that we will call him with an appointment for CT and remind him of his follow-up appointment closer to time. Pt voiced understanding.

## 2023-02-24 DIAGNOSIS — I71.20 THORACIC AORTIC ANEURYSM WITHOUT RUPTURE, UNSPECIFIED PART: Primary | ICD-10-CM

## 2023-04-17 ENCOUNTER — TELEPHONE (OUTPATIENT)
Dept: VASCULAR SURGERY | Facility: CLINIC | Age: 73
End: 2023-04-17
Payer: MEDICARE

## 2023-04-17 NOTE — TELEPHONE ENCOUNTER
UNABLE TO REACH PATIENT TO GET RESCHEDULED FROM CANCELLED TESTING AND APPOINTMENT ON 4/14/23. LEFT VM.

## 2023-05-09 ENCOUNTER — TELEPHONE (OUTPATIENT)
Dept: VASCULAR SURGERY | Facility: CLINIC | Age: 73
End: 2023-05-09
Payer: MEDICARE

## 2024-02-07 ENCOUNTER — HOSPITAL ENCOUNTER (OUTPATIENT)
Dept: CT IMAGING | Facility: HOSPITAL | Age: 74
Discharge: HOME | End: 2024-02-07
Admitting: NURSE PRACTITIONER
Payer: MEDICARE

## 2024-02-07 ENCOUNTER — OFFICE VISIT (OUTPATIENT)
Dept: CARDIAC SURGERY | Facility: CLINIC | Age: 74
End: 2024-02-07
Payer: MEDICARE

## 2024-02-07 VITALS
OXYGEN SATURATION: 94 % | WEIGHT: 195.8 LBS | HEART RATE: 69 BPM | DIASTOLIC BLOOD PRESSURE: 82 MMHG | BODY MASS INDEX: 30.73 KG/M2 | HEIGHT: 67 IN | SYSTOLIC BLOOD PRESSURE: 136 MMHG

## 2024-02-07 DIAGNOSIS — I71.20 THORACIC AORTIC ANEURYSM WITHOUT RUPTURE, UNSPECIFIED PART: Primary | ICD-10-CM

## 2024-02-07 DIAGNOSIS — I71.20 THORACIC AORTIC ANEURYSM WITHOUT RUPTURE, UNSPECIFIED PART: ICD-10-CM

## 2024-02-07 LAB — CREAT BLDA-MCNC: 1.1 MG/DL (ref 0.6–1.3)

## 2024-02-07 PROCEDURE — 82565 ASSAY OF CREATININE: CPT

## 2024-02-07 PROCEDURE — 1160F RVW MEDS BY RX/DR IN RCRD: CPT | Performed by: THORACIC SURGERY (CARDIOTHORACIC VASCULAR SURGERY)

## 2024-02-07 PROCEDURE — 25510000001 IOPAMIDOL PER 1 ML: Performed by: NURSE PRACTITIONER

## 2024-02-07 PROCEDURE — 71275 CT ANGIOGRAPHY CHEST: CPT

## 2024-02-07 PROCEDURE — 3079F DIAST BP 80-89 MM HG: CPT | Performed by: THORACIC SURGERY (CARDIOTHORACIC VASCULAR SURGERY)

## 2024-02-07 PROCEDURE — 99213 OFFICE O/P EST LOW 20 MIN: CPT | Performed by: THORACIC SURGERY (CARDIOTHORACIC VASCULAR SURGERY)

## 2024-02-07 PROCEDURE — 3075F SYST BP GE 130 - 139MM HG: CPT | Performed by: THORACIC SURGERY (CARDIOTHORACIC VASCULAR SURGERY)

## 2024-02-07 PROCEDURE — 1159F MED LIST DOCD IN RCRD: CPT | Performed by: THORACIC SURGERY (CARDIOTHORACIC VASCULAR SURGERY)

## 2024-02-07 RX ADMIN — IOPAMIDOL 100 ML: 755 INJECTION, SOLUTION INTRAVENOUS at 08:19

## 2024-02-07 NOTE — PROGRESS NOTES
Chief Complaint   Patient presents with    Thoracic Aneurysm     Patient is here for follow up w/CT         Subjective     History of Present Illness  02/07/2024  Daniele Delong is a 74-year-old male who presents today for aneurysm follow-up.    Mr. Daniele Delong reports he is doing well.  He denies any changes since his last visit.  He had a tooth extracted.  He had an x-ray performed and they thought he had plaque on his neck vessels.  He states it has been 4 years since he has had it checked.  His blood pressure was elevated.  His systolic blood pressure has decreased to an average of 135 to 150 mmHg.  He has been taking blood pressure medication for 1 week.  He has a follow-up with the provider managing his blood pressure next week.  He reports one instance of cramping while having a bowel movement.  He quit smoking in 2019.  He admits to smoking 2 cigarettes since then.  When he was younger he would quit smoking every other year.    02/02/2022  Mr. Delong is a 70-year-old male with a past medical history of known coronary artery disease, hyperlipidemia, previous myocardial infarction, and stroke who presents with incidental finding of a thoracic ascending aortic aneurysm.  He was seen by Dr. Kat after his most recent stroke work-up with incidental finding of thoracic aortic aneurysm.  He denies a family history of aneurysmal disease, sudden death in family, or a significant valvular family history.  He has had no chest pain.  He does have shortness of breath but this is been also stable for some time.  No major events since last office visit.  He now uses a CPAP device.No new chest pain and shortness of breath is stable to perhaps even a little better.      Review of Systems   Constitutional:  Positive for fatigue. Negative for activity change, appetite change, chills, diaphoresis, fever and unexpected weight change.   HENT:  Negative for dental problem, hearing loss, nosebleeds, sore throat, trouble  swallowing and voice change.    Eyes:  Negative for photophobia, redness and visual disturbance.   Respiratory:  Positive for shortness of breath. Negative for apnea, cough, chest tightness, wheezing and stridor.    Cardiovascular:  Negative for chest pain, palpitations and leg swelling.   Gastrointestinal:  Negative for abdominal distention, abdominal pain, blood in stool, constipation, diarrhea, nausea and vomiting.   Endocrine: Negative for cold intolerance, heat intolerance, polyphagia and polyuria.   Genitourinary:  Negative for decreased urine volume, difficulty urinating, dysuria, flank pain, frequency, hematuria and urgency.   Musculoskeletal:  Positive for arthralgias. Negative for back pain, gait problem, joint swelling, myalgias and neck pain.   Skin:  Negative for pallor, rash and wound.   Allergic/Immunologic: Negative for immunocompromised state.   Neurological:  Positive for light-headedness. Negative for dizziness, tremors, seizures, syncope, speech difficulty, weakness, numbness and headaches.   Hematological:  Bruises/bleeds easily.   Psychiatric/Behavioral:  Negative for confusion, sleep disturbance and suicidal ideas. The patient is not nervous/anxious.           Social History     Tobacco Use    Smoking status: Former     Types: Cigarettes     Quit date: 10/27/2019     Years since quittin.3    Smokeless tobacco: Never   Substance Use Topics    Alcohol use: No    Drug use: No     Current Outpatient Medications   Medication Sig Dispense Refill    amLODIPine (NORVASC) 5 MG tablet Take 1 tablet by mouth Daily.      aspirin 81 MG chewable tablet Chew 1 tablet Daily.      atorvastatin (LIPITOR) 80 MG tablet Take 1 tablet by mouth Daily.      carvedilol (COREG) 6.25 MG tablet Take 1 tablet by mouth 2 (two) times a day.      cloNIDine (CATAPRES) 0.1 MG tablet Take 1 tablet by mouth 2 (Two) Times a Day As Needed for High Blood Pressure (Systolic blood pressure over 160). 10 tablet 0     "losartan-hydrochlorothiazide (HYZAAR) 100-12.5 MG per tablet Take 1 tablet by mouth Daily.      vitamin B-12 (CYANOCOBALAMIN) 1000 MCG tablet Take 1 tablet by mouth Daily.       No current facility-administered medications for this visit.     Allergies:  Lisinopril    Objective      Vital Signs  Visit Vitals  /82 (BP Location: Right arm, Patient Position: Sitting, Cuff Size: Adult)   Pulse 69   Ht 170.2 cm (67\")   Wt 88.8 kg (195 lb 12.8 oz)   SpO2 94%   BMI 30.67 kg/m²         Physical Exam   Constitutional: He is oriented to person, place, and time. He appears well-developed.   No acute distress.   HENT:   Head: Normocephalic and atraumatic.   Eyes: Pupils are equal, round, and reactive to light.   Neck: No JVD present. No tracheal deviation present. No thyromegaly present.   Cardiovascular: Normal rate, regular rhythm and normal heart sounds. Exam reveals no gallop and no friction rub.   No murmur heard.  Heart is regular rate and rhythm without murmurs, rubs, or gallops.   Pulmonary/Chest: Effort normal and breath sounds normal. No respiratory distress. He has no wheezes. He has no rales. He exhibits no tenderness.   Clear to auscultation bilaterally without wheezing, rubs, or rales.   Abdominal: Soft. He exhibits no distension. There is no abdominal tenderness.   Soft, nondistended, nontender.   Musculoskeletal: Normal range of motion.   Lymphadenopathy:     He has no cervical adenopathy.   Neurological: He is alert and oriented to person, place, and time. No cranial nerve deficit.   Skin: Skin is warm and dry.       Results Review:     CT Angiogram Chest [[id number]] (Order [id number])  Order  Status: Final result     Study Notes     Barbara Boswell on 2/7/2024  8:19 AM CST   Thoracic Aortic Aneurysm, Thoracic aortic aneurysm without rupture, unspecified part     Appointment Information    Display Notes    lm to conf/Sched per Zoe. Pt is aware to arrive @ [facility] @ 830am. Kaitlin              " "          Interpretation Summary     Left ventricular systolic function is normal. Estimated EF appears to be in the range of 61 - 65%.  Left ventricular wall thickness is consistent with moderate concentric hypertrophy.  No evidence of a patent foramen ovale.      Patient Hx Of Height, Weight, and Vitals     Height Weight BSA (Calculated - sq m) BMI (kg/m2) Pulse BP   170.2 cm (67\") 81.9 kg (180 lb 8.9 oz) 1.94 sq meters 28.34 68 163/87   Reason for Exam     Cardiac source of emboli   Cardiac History     Diagnosis Date Comment Source   Coronary artery disease   Provider   Hyperlipidemia   Provider   Hypertension   Provider   Study Description     2-D Echo performed with color flow and Doppler. The study is technically adequate for diagnosis.   Echocardiogram Findings     Left Ventricle Left ventricular systolic function is normal. Estimated EF appears to be in the range of 61 - 65%. Normal left ventricular cavity size noted. All left ventricular wall segments contract normally. Left ventricular wall thickness is consistent with moderate concentric hypertrophy.   Right Ventricle Normal right ventricular cavity size and systolic function noted.   Left Atrium Left atrial cavity size is borderline dilated. Left atrial volume is borderline increased. No evidence of a patent foramen ovale. Saline test results are negative.   Right Atrium Normal right atrial size noted.   Aortic Valve The aortic valve is structurally normal. No significant aortic valve regurgitation is present. No hemodynamically significant aortic valve stenosis is present.   Mitral Valve The mitral valve is normal in structure. Trace mitral valve regurgitation is present. No significant mitral valve stenosis is present.   Tricuspid Valve The tricuspid valve is normal. No evidence of tricuspid valve stenosis is present. Trace tricuspid valve regurgitation is present. Estimated right ventricular systolic pressure from tricuspid regurgitation is mildly " elevated (35-45 mmHg).   Pulmonic Valve The pulmonic valve is not well visualized. The pulmonic valve is grossly normal in structure. There is no significant pulmonic valve stenosis present. There is no significant pulmonic valve regurgitation present.   Greater Vessels No dilation of the aortic root is present. No dilation of the sinuses of Valsalva is present.   Pericardium The pericardium is normal. There is no evidence of pericardial effusion.      LV Measurements     Dimensions   LVIDd 3.9 cm      IVSd 1.8 cm      FS 30.3 %      ESV(cubed) 19.9 ml      EDV(cubed) 58.9 ml      LVOT area 3.5 cm^2      LVOT diam 2.1 cm      Diastolic Filling   MV E max oswaldo 85.9 cm/sec      MV A max oswaldo 85.4 cm/sec      MV dec time 0.3 sec      MV E/A 1       LA Volume Index 30.4 mL/m2      Med Peak E' Oswaldo 7.2 cm/sec      Lat Peak E' Oswaldo 9 cm/sec      Avg E/e' ratio 10.6       Shunt Ratio   SV(LVOT) 134.4 ml       Dimensions   LVIDs 2.7 cm      LVPWd 1.5 cm      IVS/LVPW 1.3       LV Sys Vol (BSA corrected) 15.8 ml/m^2      LV Dillard Vol (BSA corrected) 40.3 ml/m^2      LV mass(C)d 259.6 grams      EDV(MOD-sp4) 78 ml      ESV(MOD-sp4) 30.6 ml      Systolic Function   SV(MOD-sp4) 47.4 ml      SI(MOD-sp4) 24.5 ml/m^2      EF(MOD-sp4) 60.8 %         LA Measurements     LA Dimensions   LA dimension 3.9 cm      LA volume 58.6 cm3      LA Volume Index 30.4 mL/m2         Aortic Valve Measurements     Stenosis   LVOT diam 2.1 cm      LV V1 max 137 cm/sec      LV V1 max PG 7.5 mmHg      LV V1 mean PG 4 mmHg      LV V1 VTI 38.8 cm      Ao pk oswaldo 178 cm/sec       Stenosis   Ao max PG 12.7 mmHg      Ao mean PG 7 mmHg      Ao V2 VTI 45.3 cm      NISA(I,D) 3 cm^2         Mitral Valve Measurements     Stenosis   MV dec slope 290 cm/sec^2      MV dec time 0.3 sec          Tricuspid Valve Measurements     Regurgitation   TR max oswaldo 251 cm/sec      RVSP(TR) 35.2 mmHg      RAP systole 10 mmHg      PISA   TR max oswaldo 251 cm/sec          Greater Vessels  Measurements     Ao root diam 3.2 cm      Ao root area (BSA corrected) 1.7              Study Result    Narrative & Impression   CT ANGIOGRAM CHEST- 2/2/2022 8:50 AM CST      HISTORY: Thoracic aortic aneurysm; I71.2-Thoracic aortic aneurysm,  without rupture      COMPARISON: August 11, 2021     DOSE LENGTH PRODUCT: 210 mGy cm. Automated exposure control was also  utilized to decrease patient radiation dose.     TECHNIQUE: Helical tomographic images of the chest were obtained after  the administration of intravenous contrast following angiogram protocol.  Additionally, 3D and multiplanar reformatted images were provided.        FINDINGS:    Pulmonary arteries: There is adequate enhancement of the pulmonary  arteries to evaluate for central and segmental pulmonary emboli. There  are no filling defects within the main, lobar, segmental or visualized  subsegmental pulmonary arteries. The pulmonary arteries are within  normal limits for size.      Aorta and great vessels: The aorta is well visualized. The aorta is  42  x 42 mm in maximal dimension from outer wall to outer wall. There is no  evidence of dissection.. The great vessels are normal in appearance.     Visualized neck base: The imaged portion of the base of the neck appears  unremarkable.      Lungs: The lungs are clear. There is no mass, worrisome nodule, or  consolidation. No pleural effusion is seen. The trachea and bronchial  tree are patent.      Heart: Cardiac silhouettes mildly enlarged. Coronary calcifications are  present.. There is no pericardial effusion.      Mediastinum and lymph nodes: No enlarged mediastinal, hilar, or axillary  lymph nodes are present.      Skeletal and soft tissues: The osseous structures of the thorax and  surrounding soft tissues demonstrate no acute process.     Upper abdomen: The imaged portion of the upper abdomen demonstrates no  acute process.      IMPRESSION:  1. Ascending aorta is approximately 42 x 42 mm from outer  wall to outer  wall. This was described as 44 x 46 mm on prior exam August 11, 2021.  The descending thoracic aorta is unremarkable.  2. Coronary calcifications are present.     3 mild cardiomegaly.        This report was finalized on 02/02/2022 09:32 by Dr. Abdoul Sawyer MD.       CT scan of the chest obtained November 2, 2022 demonstrates an ascending aortic aneurysm measured in greatest dimension in the short axis 4.2 cm in size.  Its morphology is fusiform.  Previously, this measured 4.1 cm in size.  No evidence of dissection.       PACS Images     Radiology Images    Study Result    Narrative & Impression   EXAMINATION: CT ANGIOGRAM CHEST- 2/7/2024 8:29 AM     HISTORY: Thoracic Aortic Aneurysm; I71.20-Thoracic aortic aneurysm,  without rupture, unspecified     TOTAL DOSE: 293.47 mGy.cm (Automatic exposure control technique was  implemented in an effort to keep the radiation dose as low as possible  without compromising image quality)     REPORT:  Spiral CT of the chest was performed after administration of intravenous  contrast from the thoracic inlet through the upper abdomen using CTA  protocol, which includes reconstructed maximum intensity projection  (MIP)coronal and sagittal images.     Comparison: CT angio chest 2/1/2023.     The contrast bolus is satisfactory, the pulmonary arteries are normal in  caliber without filling defects. Heart size is normal. The RV LV ratio  is less than 1, normal. Moderately heavy calcified plaque is noted  within the LAD and circumflex coronary arteries. Small volume of  calcified and noncalcified plaque is noted at the aortic arch and at the  origins of the great vessels. No evidence of aortic dissection. There is  dilation of the ascending aorta with a maximum diameter of 4.2 cm,  essentially unchanged. At the arch level, the aorta has a maximum  diameter of 2.8 cm and the descending thoracic aorta has a maximum  diameter of 2.4 cm. There is moderate calcified and  noncalcified plaque  within the upper abdominal aorta and at the origins of the renal  arteries, celiac artery and SMA. No intrathoracic lymphadenopathy is  identified. There is mild asymmetric enlargement of the left thyroid  lobe. No intrathoracic lymphadenopathy is identified. Review of lung  windows demonstrate mild bibasilar atelectasis, no focal pulmonary  infiltrate is identified. There is no pneumothorax or pleural effusion.  The airways are patent. No acute abnormality is seen in the upper  abdomen. Cholecystectomy clips are present. There are several left-sided  renal cysts. One of the smaller left renal cysts appears slightly  hyperattenuating, measures 12 mm and probably represents a proteinaceous  cyst. Review of bone windows shows no acute osseous abnormality.  Degenerative spurring and fused syndesmophytes are seen throughout the  spine.     IMPRESSION:  1. No acute intrathoracic abnormality, normal patency of the pulmonary  arteries. There is dilation of the ascending aorta with a maximum  diameter of 4.2 cm, unchanged. No aortic dissection is identified.  2. Moderately heavy calcified plaque at the LAD and circumflex coronary  arteries.  3. The lungs are clear there is mild bibasilar atelectasis.  4. Multiple left-sided renal cysts, at least one of the cysts is complex  and probably represents a proteinaceous cyst. This measures  approximately 12 mm.              This report was signed and finalized on 2/7/2024 8:37 AM by Dr. Marco Gold MD.       My independent interpretation of CT angiogram chest obtained on 02/07/2024 reveals a distal ascending aorta measuring 4.1 cm in size. Ascending aorta in greatest dimension measures 4.3 cm in size and the aortic root measures 3.3 cm in size. Previously, his ascending aorta measured 4.2 cm in size and the year prior to that 4.1 cm in size.    I reviewed the patient's new clinical results.  Discussed with patient        Assessment & Plan   Diagnoses and  all orders for this visit:    1. Thoracic aortic aneurysm without rupture, unspecified part (Primary)  -     CT angiogram chest w contrast; Future      1. Ascending aortic aneurysm    Medical decision making/Recommendations/Plan:  Many thanks again for the opportunity to aid in the care of your patient.  I recommended we remain conservative and continue with ongoing active surveillance.  Plan to see me back in 1 year with a repeat CT of the chest.  We discussed signs and symptoms of acute aortic syndrome today.  We discussed his aortic restrictions previously.  He is a non-smoker.  Many thanks again for the opportunity to aid in the care of your patient.  I will continue to keep you apprised of care as it ensues with plan to see him back in 1 year with a repeat CT angiogram of the chest.    Transcribed from ambient dictation for Sy Mcginnis MD by Boo Adams.  02/07/24   12:12 CST    Patient or patient representative verbalized consent to the visit recording.  I have personally performed the services described in this document as transcribed by the above individual, and it is both accurate and complete.

## 2024-02-23 ENCOUNTER — TELEPHONE (OUTPATIENT)
Dept: VASCULAR SURGERY | Facility: CLINIC | Age: 74
End: 2024-02-23
Payer: MEDICARE

## 2024-03-06 DIAGNOSIS — I73.9 PAD (PERIPHERAL ARTERY DISEASE): Primary | ICD-10-CM

## 2024-03-26 ENCOUNTER — TELEPHONE (OUTPATIENT)
Dept: VASCULAR SURGERY | Facility: CLINIC | Age: 74
End: 2024-03-26
Payer: MEDICARE

## 2024-03-27 ENCOUNTER — HOSPITAL ENCOUNTER (OUTPATIENT)
Dept: ULTRASOUND IMAGING | Facility: HOSPITAL | Age: 74
Discharge: HOME | End: 2024-03-27
Admitting: NURSE PRACTITIONER
Payer: MEDICARE

## 2024-03-27 ENCOUNTER — TELEPHONE (OUTPATIENT)
Dept: VASCULAR SURGERY | Facility: CLINIC | Age: 74
End: 2024-03-27
Payer: MEDICARE

## 2024-03-27 ENCOUNTER — OFFICE VISIT (OUTPATIENT)
Dept: VASCULAR SURGERY | Facility: CLINIC | Age: 74
End: 2024-03-27
Payer: MEDICARE

## 2024-03-27 VITALS
HEIGHT: 67 IN | DIASTOLIC BLOOD PRESSURE: 82 MMHG | WEIGHT: 194 LBS | OXYGEN SATURATION: 98 % | SYSTOLIC BLOOD PRESSURE: 160 MMHG | HEART RATE: 68 BPM | BODY MASS INDEX: 30.45 KG/M2

## 2024-03-27 DIAGNOSIS — E78.49 OTHER HYPERLIPIDEMIA: ICD-10-CM

## 2024-03-27 DIAGNOSIS — I10 ESSENTIAL HYPERTENSION: ICD-10-CM

## 2024-03-27 DIAGNOSIS — I71.21 ANEURYSM OF ASCENDING AORTA WITHOUT RUPTURE: ICD-10-CM

## 2024-03-27 DIAGNOSIS — I73.9 PAD (PERIPHERAL ARTERY DISEASE): ICD-10-CM

## 2024-03-27 DIAGNOSIS — I65.23 BILATERAL CAROTID ARTERY STENOSIS: Primary | ICD-10-CM

## 2024-03-27 PROCEDURE — 1160F RVW MEDS BY RX/DR IN RCRD: CPT | Performed by: NURSE PRACTITIONER

## 2024-03-27 PROCEDURE — 3077F SYST BP >= 140 MM HG: CPT | Performed by: NURSE PRACTITIONER

## 2024-03-27 PROCEDURE — 1159F MED LIST DOCD IN RCRD: CPT | Performed by: NURSE PRACTITIONER

## 2024-03-27 PROCEDURE — 99214 OFFICE O/P EST MOD 30 MIN: CPT | Performed by: NURSE PRACTITIONER

## 2024-03-27 PROCEDURE — 3079F DIAST BP 80-89 MM HG: CPT | Performed by: NURSE PRACTITIONER

## 2024-03-27 PROCEDURE — 93923 UPR/LXTR ART STDY 3+ LVLS: CPT

## 2024-03-27 NOTE — PROGRESS NOTES
"03/27/2024       Tim Peguero MD  [address] [place] [address]    Daniele Delong  1950    Chief Complaint   Patient presents with    BILATERAL CAROTID ARTERY STENOSIS     No stroke symptoms, lightheaded at times       Dear Tim Peguero MD   HPI  I had the pleasure of seeing your patient Daniele Delong in the office today.    As you recall, Daniele Delong is a 74 y.o.  male who you are currently following for routine health maintenance.  He does have a history of stroke with dizziness and blurred vision.  He was a previous smoker but quit in 2019.  He is followed by Dr. Mcginnis for an ascending aortic aneurysm, last seen 2/7/24.  He has previous left carotid endarterectomy many years ago out of town.  He is maintained on aspirin and Lipitor.  He did have noninvasive testing performed which I did review in office.      Review of Systems   Constitutional: Negative.    HENT: Negative.     Eyes: Negative.    Respiratory: Negative.     Cardiovascular: Negative.    Gastrointestinal: Negative.    Endocrine: Negative.    Genitourinary: Negative.    Musculoskeletal: Negative.    Skin: Negative.    Allergic/Immunologic: Negative.    Neurological:  Positive for dizziness and numbness (To his feet at times).   Hematological: Negative.    Psychiatric/Behavioral: Negative.     All other systems reviewed and are negative.       /82   Pulse 68   Ht 170.2 cm (67\")   Wt 88 kg (194 lb)   SpO2 98%   BMI 30.38 kg/m²    Physical Exam  Vitals and nursing note reviewed.   Constitutional:       Appearance: Normal appearance. He is well-developed. He is obese.   HENT:      Head: Normocephalic and atraumatic.   Eyes:      General: No scleral icterus.     Pupils: Pupils are equal, round, and reactive to light.   Neck:      Thyroid: No thyromegaly.   Cardiovascular:      Rate and Rhythm: Normal rate and regular rhythm.      Heart sounds: Normal heart sounds.   Pulmonary:      Effort: Pulmonary " Critical Care effort is normal.      Breath sounds: Normal breath sounds.   Abdominal:      General: Bowel sounds are normal.      Palpations: Abdomen is soft.   Musculoskeletal:         General: Normal range of motion.      Cervical back: Normal range of motion and neck supple.   Skin:     General: Skin is warm and dry.   Neurological:      Mental Status: He is alert and oriented to person, place, and time.   Psychiatric:         Mood and Affect: Mood normal.         Behavior: Behavior normal.         Thought Content: Thought content normal.         Judgment: Judgment normal.           Diagnostic Data:               Patient Active Problem List   Diagnosis    Paresthesias    Essential hypertension    Oropharyngeal dysphagia    Other hyperlipidemia    Thoracic aortic aneurysm without rupture        Diagnosis Plan   1. Bilateral carotid artery stenosis  Ambulatory Referral to Cardiology      2. Essential hypertension        3. Other hyperlipidemia        4. Aneurysm of ascending aorta without rupture              Plan: After thoroughly evaluating Daniele Delong, I believe the best course of action is to proceed with a left TCAR for stroke risk reduction.  He has previously undergone a left carotid endarterectomy.  He does have significant stenosis of about 80% upon review.  Risks of transcarotid artery revascularization include, but are not limited to, bleeding, infection, vessel damage, nerve damage, MI, stroke, and death.  The patient understands these risks and would like to proceed with the procedure.  I will add Plavix for him to begin.  He will continue aspirin, Plavix, and Lipitor uninterrupted prior to surgery.  I will refer to cardiology and once I receive clearance we will schedule him appropriately.  I did discuss vascular risk factors as they pertain to the progression of vascular disease including controlling his hypertension and hyperlipidemia.  His blood pressure is elevated in office at 160/82.  He should continue  to monitor and contact his primary care provider if this continues.  He will also continue on his aspirin 81 mg daily, Plavix 75 mg daily, and Lipitor 80 mg daily in addition to his other medications.   This was all discussed in full with complete understanding.  Thank you for allowing me to participate in the care of your patient.  Please do not hesitate to call with any questions or concerns.  We will keep you aware of any further encounters with Daniele Delogn.        Sincerely yours,         KWABENA Larios Jonathan E, MD     Endocrinology

## 2024-04-12 ENCOUNTER — APPOINTMENT (OUTPATIENT)
Dept: CT IMAGING | Facility: HOSPITAL | Age: 74
End: 2024-04-12
Payer: MEDICARE

## 2024-04-12 ENCOUNTER — HOSPITAL ENCOUNTER (EMERGENCY)
Facility: HOSPITAL | Age: 74
Discharge: HOME | End: 2024-04-12
Attending: STUDENT IN AN ORGANIZED HEALTH CARE EDUCATION/TRAINING PROGRAM
Payer: MEDICARE

## 2024-04-12 ENCOUNTER — TELEPHONE (OUTPATIENT)
Dept: VASCULAR SURGERY | Facility: CLINIC | Age: 74
End: 2024-04-12
Payer: MEDICARE

## 2024-04-12 ENCOUNTER — APPOINTMENT (OUTPATIENT)
Dept: MRI IMAGING | Facility: HOSPITAL | Age: 74
End: 2024-04-12
Payer: MEDICARE

## 2024-04-12 VITALS
WEIGHT: 200 LBS | SYSTOLIC BLOOD PRESSURE: 161 MMHG | DIASTOLIC BLOOD PRESSURE: 73 MMHG | RESPIRATION RATE: 18 BRPM | BODY MASS INDEX: 39.27 KG/M2 | HEIGHT: 60 IN | OXYGEN SATURATION: 100 % | HEART RATE: 58 BPM | TEMPERATURE: 98 F

## 2024-04-12 DIAGNOSIS — R42 DIZZINESS: Primary | ICD-10-CM

## 2024-04-12 LAB
ANION GAP SERPL CALCULATED.3IONS-SCNC: 8 MMOL/L (ref 5–15)
BASOPHILS # BLD AUTO: 0.05 10*3/MM3 (ref 0–0.2)
BASOPHILS NFR BLD AUTO: 0.8 % (ref 0–1.5)
BUN SERPL-MCNC: 22 MG/DL (ref 8–23)
BUN/CREAT SERPL: 18.6 (ref 7–25)
CALCIUM SPEC-SCNC: 10 MG/DL (ref 8.6–10.5)
CHLORIDE SERPL-SCNC: 105 MMOL/L (ref 98–107)
CO2 SERPL-SCNC: 29 MMOL/L (ref 22–29)
CREAT SERPL-MCNC: 1.18 MG/DL (ref 0.76–1.27)
DEPRECATED RDW RBC AUTO: 41 FL (ref 37–54)
EGFRCR SERPLBLD CKD-EPI 2021: 64 ML/MIN/1.73
EOSINOPHIL # BLD AUTO: 0.31 10*3/MM3 (ref 0–0.4)
EOSINOPHIL NFR BLD AUTO: 5.3 % (ref 0.3–6.2)
ERYTHROCYTE [DISTWIDTH] IN BLOOD BY AUTOMATED COUNT: 13.9 % (ref 12.3–15.4)
GLUCOSE SERPL-MCNC: 114 MG/DL (ref 65–99)
HCT VFR BLD AUTO: 45.9 % (ref 37.5–51)
HGB BLD-MCNC: 15.1 G/DL (ref 13–17.7)
HOLD SPECIMEN: NORMAL
IMM GRANULOCYTES # BLD AUTO: 0.03 10*3/MM3 (ref 0–0.05)
IMM GRANULOCYTES NFR BLD AUTO: 0.5 % (ref 0–0.5)
LYMPHOCYTES # BLD AUTO: 1.38 10*3/MM3 (ref 0.7–3.1)
LYMPHOCYTES NFR BLD AUTO: 23.4 % (ref 19.6–45.3)
MAGNESIUM SERPL-MCNC: 1.9 MG/DL (ref 1.6–2.4)
MCH RBC QN AUTO: 26.9 PG (ref 26.6–33)
MCHC RBC AUTO-ENTMCNC: 32.9 G/DL (ref 31.5–35.7)
MCV RBC AUTO: 81.8 FL (ref 79–97)
MONOCYTES # BLD AUTO: 0.57 10*3/MM3 (ref 0.1–0.9)
MONOCYTES NFR BLD AUTO: 9.7 % (ref 5–12)
NEUTROPHILS NFR BLD AUTO: 3.56 10*3/MM3 (ref 1.7–7)
NEUTROPHILS NFR BLD AUTO: 60.3 % (ref 42.7–76)
NRBC BLD AUTO-RTO: 0 /100 WBC (ref 0–0.2)
PLATELET # BLD AUTO: 257 10*3/MM3 (ref 140–450)
PMV BLD AUTO: 10.1 FL (ref 6–12)
POTASSIUM SERPL-SCNC: 4.3 MMOL/L (ref 3.5–5.2)
RBC # BLD AUTO: 5.61 10*6/MM3 (ref 4.14–5.8)
SODIUM SERPL-SCNC: 142 MMOL/L (ref 136–145)
WBC NRBC COR # BLD AUTO: 5.9 10*3/MM3 (ref 3.4–10.8)
WHOLE BLOOD HOLD COAG: NORMAL
WHOLE BLOOD HOLD SPECIMEN: NORMAL

## 2024-04-12 PROCEDURE — 83735 ASSAY OF MAGNESIUM: CPT | Performed by: STUDENT IN AN ORGANIZED HEALTH CARE EDUCATION/TRAINING PROGRAM

## 2024-04-12 PROCEDURE — 93010 ELECTROCARDIOGRAM REPORT: CPT | Performed by: EMERGENCY MEDICINE

## 2024-04-12 PROCEDURE — 85025 COMPLETE CBC W/AUTO DIFF WBC: CPT | Performed by: STUDENT IN AN ORGANIZED HEALTH CARE EDUCATION/TRAINING PROGRAM

## 2024-04-12 PROCEDURE — 80048 BASIC METABOLIC PNL TOTAL CA: CPT | Performed by: STUDENT IN AN ORGANIZED HEALTH CARE EDUCATION/TRAINING PROGRAM

## 2024-04-12 PROCEDURE — 93005 ELECTROCARDIOGRAM TRACING: CPT | Performed by: STUDENT IN AN ORGANIZED HEALTH CARE EDUCATION/TRAINING PROGRAM

## 2024-04-12 PROCEDURE — 36415 COLL VENOUS BLD VENIPUNCTURE: CPT

## 2024-04-12 PROCEDURE — 70551 MRI BRAIN STEM W/O DYE: CPT

## 2024-04-12 PROCEDURE — 25810000003 LACTATED RINGERS SOLUTION: Performed by: STUDENT IN AN ORGANIZED HEALTH CARE EDUCATION/TRAINING PROGRAM

## 2024-04-12 PROCEDURE — 70450 CT HEAD/BRAIN W/O DYE: CPT

## 2024-04-12 PROCEDURE — 99284 EMERGENCY DEPT VISIT MOD MDM: CPT

## 2024-04-12 RX ORDER — CLOPIDOGREL BISULFATE 75 MG/1
75 TABLET ORAL DAILY
Qty: 30 TABLET | Refills: 1 | Status: SHIPPED | OUTPATIENT
Start: 2024-04-12

## 2024-04-12 RX ADMIN — SODIUM CHLORIDE, POTASSIUM CHLORIDE, SODIUM LACTATE AND CALCIUM CHLORIDE 1000 ML: 600; 310; 30; 20 INJECTION, SOLUTION INTRAVENOUS at 14:50

## 2024-04-12 NOTE — ED NOTES
Pt presents from home with co generalized weakness over the past few days, along with dizziness. Hx of 3 strokes with no deficits noted. Denies ha, sob, or cp.

## 2024-04-12 NOTE — ED PROVIDER NOTES
Subjective   History of Present Illness  Patient presents due to dizziness.  Started around 10 AM.  He also had some nausea and lightheadedness.  Feels it is generally eased off now..  History of strokes and is concerned because one of his strokes had dizziness with it.  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  No facial droop, numbness, tingling, weakness.  Feels a little bit dizzy whenever he gets up or tries to move no abdominal pain or vomiting.    Review of Systems    Past Medical History:   Diagnosis Date    Coronary artery disease     Hyperlipidemia     Hypertension     Myocardial infarction     Stroke        Allergies   Allergen Reactions    Lisinopril Cough     cough       Past Surgical History:   Procedure Laterality Date    APPENDECTOMY      CAROTID ENDARTERECTOMY      CHOLECYSTECTOMY      COLON SURGERY      GTUBE INSERTION         History reviewed. No pertinent family history.    Social History     Socioeconomic History    Marital status: Single   Tobacco Use    Smoking status: Former     Current packs/day: 0.00     Types: Cigarettes     Quit date: 10/27/2019     Years since quittin.4    Smokeless tobacco: Never   Substance and Sexual Activity    Alcohol use: No    Drug use: No    Sexual activity: Defer           Objective   Physical Exam  Vitals reviewed.   Constitutional:       General: He is not in acute distress.  HENT:      Head: Normocephalic and atraumatic.   Eyes:      Extraocular Movements: Extraocular movements intact.      Conjunctiva/sclera: Conjunctivae normal.   Cardiovascular:      Pulses: Normal pulses.      Heart sounds: Normal heart sounds.   Pulmonary:      Effort: Pulmonary effort is normal. No respiratory distress.   Abdominal:      General: Abdomen is flat. There is no distension.   Musculoskeletal:      Cervical back: Normal range of motion and neck supple.   Skin:     General: Skin is warm and dry.    Neurological:      General: No focal deficit present.      Mental Status: He is alert. Mental status is at baseline.      Comments: Right upper extremity: 5/5 strength with handgrip and flexion/extension of shoulders, elbows.   Light touch sensation intact and equal when compared to the left upper extremity.    Left upper extremity: 5/5 strength with handgrip and flexion/extension of shoulders, elbows.   Light touch sensation intact and equal when compared to the right upper extremity.    Right lower extremity: 5/5 strength with flexion/extension of hips, knees, and dorsi/plantarflexion of ankles. Able to wiggle toes.   Light touch sensation intact and equal when compared to the left lower extremity.    Left lower extremity: 5/5 strength with flexion/extension of hips, knees, and dorsi/plantarflexion of ankles. Able to wiggle toes.   Light touch sensation intact and equal when compared to the right lower extremity.    Light sensation intact in bilateral face. CN 2-12 normal. FNF normal. Gait normal without ataxia or limp.   Psychiatric:         Behavior: Behavior normal.         Thought Content: Thought content normal.         Procedures           ED Course                                             Medical Decision Making  Amount and/or Complexity of Data Reviewed  Labs: ordered.  Radiology: ordered.  ECG/medicine tests: ordered.        Final diagnoses:   None       ED Disposition  ED Disposition       None            No follow-up provider specified.       Medication List      No changes were made to your prescriptions during this visit.          ordered.  ECG/medicine tests: ordered.      Daniele Delong is a 74 y.o. male with PMH above who presents to the Emergency Department with dizziness and lightheadedness.  Based on his history of stroke he is concerned for stroke.    Diagnoses considered include cerebellar stroke, BPPV, orthostasis, vestibular neuritis, Ménière disease, bacterial labyrinthitis, acoustic neuroma.  Finger-nose-finger, ambulation, heel shin unremarkable so cerebellar infarct is unlikely at this time.  Patient not acutely dizzy on my exam so HINTS exam not indicated.  Ear exam shows normal TM bilaterally, no effusions or signs of OM or perforation.  No recent URI or OM, afebrile, dizziness not severe and constant so bacterial labyrinthitis unlikely.  Sudden head movements did not precipitate dizziness so BPPV less likely at this time.  No tinnitus so acoustic neuroma less likely.  Symptoms not sudden and severe and constant so vestibular neuronitis unlikely.    Advanced imaging is indicated at this time given the above with CT head    Will obtain basic labs including electrolytes, ECG, and administer 1L IV fluids to assess for symptomatic response    CT head negative.  Discussed that this could be of somewhat limited utility with small and recent strokes and discussed that MRI would be more sensitive although it is not required if the patient is feeling improved.  We attempted ambulation together and he appeared lightheaded and seemed to be stumbling a bit; he requests an MRI of the brain.  This was obtained and was normal.    On repeat assessment, patient felt much better and symptoms were resolved.  Most likely diagnosis considered at this time is orthostasis given improvement with fluids.  Given this, plan is for discharge.  Patient was counseled on the need for follow-up and symptomatic regimen was discussed.  Return precautions were discussed including severe worsening of symptoms such as worsening of dizziness, syncope, chest pain,  or other new emergent concerns.  Patient expressed understanding and was discharged in stable condition without acute event    Final diagnosis: dizziness    All questions answered. Patient/family was understanding and in agreement with today's assessment and plan. The patient was monitored during their stay in the ED and dispositioned without acute event.    Electronically signed by:  Vik Rogers MD 4/24/2024 07:28 CDT      Note: Dragon medical dictation software was used in the creation of this note.      Final diagnoses:   Dizziness       ED Disposition  ED Disposition       ED Disposition   Discharge    Condition   Stable    Comment   --               Tim Peguero MD  [address] [place] [address]  [phone]               Medication List      No changes were made to your prescriptions during this visit.            Vik Rogers MD  04/24/24 0799

## 2024-04-12 NOTE — TELEPHONE ENCOUNTER
Patient called stating he saw KWABENA Larios on 03/27/24. Patient states he was suppose to be sent to Cardiology for clearance. He was asking if we had an appointment schedule yet. I informed him we did not and that I would follow up with KWABENA Larios and get back with him. Patient verbalized understanding and will call with any questions or concerns.

## 2024-04-12 NOTE — TELEPHONE ENCOUNTER
Called and got patient appointment scheduled for 04/18/2024 at 1:45 patient notified of appointment. I explained once Cardiology clearned him for surgery we would then schedule his procedure he verbalized understanding.

## 2024-04-12 NOTE — DISCHARGE INSTRUCTIONS
Come back for any signs of stroke such as weakness, numbness, trouble walking, or other emergencies. Otherwie, follow-up with your doctor.

## 2024-04-14 LAB
QT INTERVAL: 408 MS
QTC INTERVAL: 408 MS

## 2024-04-18 ENCOUNTER — PREP FOR SURGERY (OUTPATIENT)
Dept: OTHER | Facility: HOSPITAL | Age: 74
End: 2024-04-18
Payer: MEDICARE

## 2024-04-18 ENCOUNTER — OFFICE VISIT (OUTPATIENT)
Dept: CARDIOLOGY | Facility: CLINIC | Age: 74
End: 2024-04-18
Payer: MEDICARE

## 2024-04-18 ENCOUNTER — TELEPHONE (OUTPATIENT)
Dept: VASCULAR SURGERY | Facility: CLINIC | Age: 74
End: 2024-04-18
Payer: MEDICARE

## 2024-04-18 VITALS
OXYGEN SATURATION: 97 % | SYSTOLIC BLOOD PRESSURE: 138 MMHG | HEIGHT: 60 IN | WEIGHT: 189 LBS | BODY MASS INDEX: 37.11 KG/M2 | DIASTOLIC BLOOD PRESSURE: 64 MMHG | HEART RATE: 62 BPM

## 2024-04-18 DIAGNOSIS — Z01.818 PREOP TESTING: ICD-10-CM

## 2024-04-18 DIAGNOSIS — I65.22 CAROTID STENOSIS, LEFT: Primary | ICD-10-CM

## 2024-04-18 DIAGNOSIS — I10 ESSENTIAL HYPERTENSION: ICD-10-CM

## 2024-04-18 DIAGNOSIS — Z51.81 ENCOUNTER FOR MONITORING ANTIPLATELET THERAPY: ICD-10-CM

## 2024-04-18 DIAGNOSIS — Z79.02 ENCOUNTER FOR MONITORING ANTIPLATELET THERAPY: ICD-10-CM

## 2024-04-18 DIAGNOSIS — E78.49 OTHER HYPERLIPIDEMIA: ICD-10-CM

## 2024-04-18 DIAGNOSIS — I65.22 STENOSIS OF LEFT CAROTID ARTERY: Primary | ICD-10-CM

## 2024-04-18 DIAGNOSIS — I71.20 THORACIC AORTIC ANEURYSM WITHOUT RUPTURE, UNSPECIFIED PART: ICD-10-CM

## 2024-04-18 PROCEDURE — 3075F SYST BP GE 130 - 139MM HG: CPT | Performed by: EMERGENCY MEDICINE

## 2024-04-18 PROCEDURE — 3078F DIAST BP <80 MM HG: CPT | Performed by: EMERGENCY MEDICINE

## 2024-04-18 PROCEDURE — 1159F MED LIST DOCD IN RCRD: CPT | Performed by: EMERGENCY MEDICINE

## 2024-04-18 PROCEDURE — 99204 OFFICE O/P NEW MOD 45 MIN: CPT | Performed by: EMERGENCY MEDICINE

## 2024-04-18 PROCEDURE — 1160F RVW MEDS BY RX/DR IN RCRD: CPT | Performed by: EMERGENCY MEDICINE

## 2024-04-18 NOTE — PROGRESS NOTES
[facility] - CARDIOLOGY  New Patient Initial Outpatient Evaulation    Primary Care Physician: Tim Peguero MD    Subjective     Chief Complaint   Patient presents with    Surgical Clearance     NEW PT/ DR BUTLER         History of Present Illness  The patient is a 74-year-old male who presents for preoperative evaluation.    He is scheduled to undergo carotid endarterectomy with KWABENA Larios and Dr. Colton Butler. He has previously consulted with Dr. Mcginnis for an aortic aneurysm and a left transcarotid artery revascularization. He was recently hospitalized due to elevated blood pressure, peaking in the 190s/90s mmHg. His blood pressure has since stabilized, typically ranging from 130 mmHg to 140 mmHg systolic, with a reading of 126/80 mmHg last night. He denies any history of cardiac issues, including chest pain, tightness, or pressure. He denies experiencing severe dyspnea during physical exertion but occasionally experiences dizziness. He tends to experience mild dyspnea upon exertion, particularly when ascending stairs.    Approximately 23 years ago, he suffered a cerebrovascular accident and underwent a cardiac catheterization, which revealed a 90 percent blockage.     He was previously a tobacco smoker but quit years ago.    He is currently taking aspirin, Plavix, Lipitor, amlodipine, Coreg, and losartan.    Review of Systems   Constitutional: Negative for diaphoresis, fever and malaise/fatigue.   HENT:  Negative for congestion.    Eyes:  Negative for vision loss in left eye and vision loss in right eye.   Cardiovascular:  Negative for chest pain, claudication, dyspnea on exertion, irregular heartbeat, leg swelling, orthopnea, palpitations and syncope.   Respiratory:  Negative for cough, shortness of breath and wheezing.    Hematologic/Lymphatic: Negative for adenopathy.   Skin:  Negative for rash.   Musculoskeletal:  Negative for joint pain and joint swelling.   Gastrointestinal:  Negative  for abdominal pain, diarrhea, nausea and vomiting.   Neurological:  Negative for excessive daytime sleepiness, dizziness, focal weakness, light-headedness, numbness and weakness.   Psychiatric/Behavioral:  Negative for depression. The patient does not have insomnia.         Otherwise complete ROS reviewed and negative except as mentioned in the HPI.      Past Medical History:   Past Medical History:   Diagnosis Date    Coronary artery disease     Hyperlipidemia     Hypertension     Myocardial infarction     Stroke        Past Surgical History:  Past Surgical History:   Procedure Laterality Date    APPENDECTOMY      CAROTID ENDARTERECTOMY      CHOLECYSTECTOMY      COLON SURGERY      GTUBE INSERTION  2020       Family History: family history includes Heart attack in his father; Heart disease in his father.    Social History:  reports that he quit smoking about 4 years ago. His smoking use included cigarettes. He has never used smokeless tobacco. He reports that he does not drink alcohol and does not use drugs.    Medications:  Prior to Admission medications    Medication Sig Start Date End Date Taking? Authorizing Provider   amLODIPine (NORVASC) 5 MG tablet Take 1 tablet by mouth Daily.   Yes Spring Chu MD   aspirin 81 MG chewable tablet Chew 1 tablet Daily.   Yes Spring Chu MD   atorvastatin (LIPITOR) 80 MG tablet Take 1 tablet by mouth Daily.   Yes ProviderSpring MD   carvedilol (COREG) 6.25 MG tablet Take 1 tablet by mouth 2 (two) times a day. 11/12/20  Yes Spring Chu MD   cloNIDine (CATAPRES) 0.1 MG tablet Take 1 tablet by mouth 2 (Two) Times a Day As Needed for High Blood Pressure (Systolic blood pressure over 160). 11/18/19  Yes Corey Enriquez MD   clopidogrel (Plavix) 75 MG tablet Take 1 tablet by mouth Daily. 4/12/24  Yes Maria Luz Chatman APRN   losartan-hydrochlorothiazide (HYZAAR) 100-12.5 MG per tablet Take 1 tablet by mouth Daily. 12/8/20  Yes Vlad  "MD Spring   vitamin B-12 (CYANOCOBALAMIN) 1000 MCG tablet Take 1 tablet by mouth Daily.   Yes Provider, MD Spring     Allergies:  Allergies   Allergen Reactions    Lisinopril Cough     cough       Objective     Vital Signs: /64   Pulse 62   Ht 152.4 cm (60\")   Wt 85.7 kg (189 lb)   SpO2 97%   BMI 36.91 kg/m²     Vitals and nursing note reviewed.   Constitutional:       Appearance: Normal and healthy appearance. Well-developed and not in distress.   Eyes:      Extraocular Movements: Extraocular movements intact.      Pupils: Pupils are equal, round, and reactive to light.   HENT:      Head: Normocephalic and atraumatic.    Mouth/Throat:      Pharynx: Oropharynx is clear.   Neck:      Vascular: JVD normal.      Trachea: Trachea normal.   Pulmonary:      Effort: Pulmonary effort is normal.      Breath sounds: Normal breath sounds. No wheezing. No rhonchi. No rales.   Cardiovascular:      PMI at left midclavicular line. Normal rate. Regular rhythm. Normal S1. Normal S2.       Murmurs: There is a grade 2/6 systolic murmur.      No gallop.  No click. No rub.   Pulses:     Dorsalis pedis: 2+ bilaterally.     Posterior tibial: 2+ bilaterally.  Abdominal:      General: Bowel sounds are normal.      Palpations: Abdomen is soft.      Tenderness: There is no abdominal tenderness.   Musculoskeletal: Normal range of motion.      Cervical back: Normal range of motion and neck supple. Skin:     General: Skin is warm and dry.      Capillary Refill: Capillary refill takes less than 2 seconds.   Feet:      Right foot:      Skin integrity: Skin integrity normal.      Left foot:      Skin integrity: Skin integrity normal.   Neurological:      Mental Status: Alert and oriented to person, place and time.      Sensory: Sensation is intact.      Motor: Motor function is intact.      Coordination: Coordination is intact.   Psychiatric:         Speech: Speech normal.         Behavior: Behavior is cooperative. "         Results Reviewed:      ECG 12 Lead    Date/Time: 4/21/2024 5:55 PM  Performed by: Issac Chamberlain DO    Authorized by: Issac Chamberlain DO  Comparison: not compared with previous ECG   Previous ECG: no previous ECG available  Rhythm: sinus bradycardia  Rate: bradycardic  Conduction: conduction normal  QRS axis: normal    Clinical impression: abnormal EKG            Lab Results   Component Value Date    TRIG 268 (H) 11/14/2019    HDL 28 (L) 11/14/2019     Lab Results   Component Value Date    HGBA1C 5.9 11/14/2019       Assessment / Plan        Problem List Items Addressed This Visit       Essential hypertension    Other hyperlipidemia    Thoracic aortic aneurysm without rupture    Carotid stenosis, left - Primary     Preoperative evaluation  Echocardiogram from a couple of years ago was normal with a strong ejection fraction of 61 to 65 percent. Mild hypertrophy was observed. The left atrium was borderline increased. The aortic, mitral, tricuspid, and pulmonic valves were normal. An EKG revealed sinus bradycardia but no ST wave changes. His cardiac health is satisfactory. Currently, there is no necessity for further cardiac testing, and he is cleared to undergo surgery. He has been advised to continue his current medication regimen as prescribed.       Transcribed from ambient dictation for Issac Chamberlain DO by Prem Maloney.  04/18/24   15:24 CDT    Patient or patient representative verbalized consent to the visit recording.  I have personally performed the services described in this document as transcribed by the above individual, and it is both accurate and complete.  Issac Chamberlain DO  4/21/2024  17:55 CDT

## 2024-04-18 NOTE — TELEPHONE ENCOUNTER
----- Message from Issac Chamberlain DO sent at 4/18/2024  2:03 PM CDT -----  PT IS LOW RISK TO PROCEED WITH CAROTID SURGERY.  NO NEED FOR ANY FURTHER CARDIAC TESTING. RECOMMEND PROCEEDING.  ISSAC

## 2024-04-21 PROCEDURE — 93000 ELECTROCARDIOGRAM COMPLETE: CPT | Performed by: EMERGENCY MEDICINE

## 2024-04-22 ENCOUNTER — PREP FOR SURGERY (OUTPATIENT)
Dept: OTHER | Facility: HOSPITAL | Age: 74
End: 2024-04-22
Payer: MEDICARE

## 2024-04-22 DIAGNOSIS — I65.22 STENOSIS OF LEFT CAROTID ARTERY: Primary | ICD-10-CM

## 2024-04-23 PROBLEM — Z01.818 PREOP TESTING: Status: ACTIVE | Noted: 2024-04-18

## 2024-04-23 PROBLEM — I65.22 STENOSIS OF LEFT CAROTID ARTERY: Status: ACTIVE | Noted: 2024-04-18

## 2024-04-24 ENCOUNTER — TELEPHONE (OUTPATIENT)
Dept: VASCULAR SURGERY | Facility: CLINIC | Age: 74
End: 2024-04-24
Payer: MEDICARE

## 2024-04-24 NOTE — TELEPHONE ENCOUNTER
Pt expressed understanding of prework/surgery time and instruction for procedure scheduled 05/09/24 with Dr. Kat.  NPO after midnight.  Pt will continue aspirin, plavix, and lipitor uninterrupted prior to procedure but will not take morning of procedure.

## 2024-05-02 ENCOUNTER — HOSPITAL ENCOUNTER (OUTPATIENT)
Dept: GENERAL RADIOLOGY | Facility: HOSPITAL | Age: 74
Discharge: HOME | End: 2024-05-02
Payer: MEDICARE

## 2024-05-02 ENCOUNTER — PRE-ADMISSION TESTING (OUTPATIENT)
Dept: PREADMISSION TESTING | Facility: HOSPITAL | Age: 74
End: 2024-05-02
Payer: MEDICARE

## 2024-05-02 VITALS
WEIGHT: 190.92 LBS | OXYGEN SATURATION: 97 % | BODY MASS INDEX: 29.97 KG/M2 | SYSTOLIC BLOOD PRESSURE: 136 MMHG | HEIGHT: 67 IN | RESPIRATION RATE: 18 BRPM | HEART RATE: 63 BPM | DIASTOLIC BLOOD PRESSURE: 81 MMHG

## 2024-05-02 DIAGNOSIS — Z01.818 PREOP TESTING: ICD-10-CM

## 2024-05-02 DIAGNOSIS — I65.22 STENOSIS OF LEFT CAROTID ARTERY: ICD-10-CM

## 2024-05-02 DIAGNOSIS — Z51.81 ENCOUNTER FOR MONITORING ANTIPLATELET THERAPY: ICD-10-CM

## 2024-05-02 DIAGNOSIS — Z79.02 ENCOUNTER FOR MONITORING ANTIPLATELET THERAPY: ICD-10-CM

## 2024-05-02 LAB
ANION GAP SERPL CALCULATED.3IONS-SCNC: 6 MMOL/L (ref 5–15)
APTT PPP: 28.7 SECONDS (ref 24.5–36)
BASOPHILS # BLD AUTO: 0.04 10*3/MM3 (ref 0–0.2)
BASOPHILS NFR BLD AUTO: 0.6 % (ref 0–1.5)
BUN SERPL-MCNC: 22 MG/DL (ref 8–23)
BUN/CREAT SERPL: 17.6 (ref 7–25)
CALCIUM SPEC-SCNC: 9.6 MG/DL (ref 8.6–10.5)
CHLORIDE SERPL-SCNC: 105 MMOL/L (ref 98–107)
CO2 SERPL-SCNC: 31 MMOL/L (ref 22–29)
CREAT SERPL-MCNC: 1.25 MG/DL (ref 0.76–1.27)
DEPRECATED RDW RBC AUTO: 40.4 FL (ref 37–54)
EGFRCR SERPLBLD CKD-EPI 2021: 59.7 ML/MIN/1.73
EOSINOPHIL # BLD AUTO: 0.3 10*3/MM3 (ref 0–0.4)
EOSINOPHIL NFR BLD AUTO: 4.3 % (ref 0.3–6.2)
ERYTHROCYTE [DISTWIDTH] IN BLOOD BY AUTOMATED COUNT: 13.9 % (ref 12.3–15.4)
GLUCOSE SERPL-MCNC: 127 MG/DL (ref 65–99)
HCT VFR BLD AUTO: 46.5 % (ref 37.5–51)
HGB BLD-MCNC: 15.2 G/DL (ref 13–17.7)
IMM GRANULOCYTES # BLD AUTO: 0.02 10*3/MM3 (ref 0–0.05)
IMM GRANULOCYTES NFR BLD AUTO: 0.3 % (ref 0–0.5)
INR PPP: 0.93 (ref 0.91–1.09)
LYMPHOCYTES # BLD AUTO: 1.49 10*3/MM3 (ref 0.7–3.1)
LYMPHOCYTES NFR BLD AUTO: 21.6 % (ref 19.6–45.3)
MCH RBC QN AUTO: 26.5 PG (ref 26.6–33)
MCHC RBC AUTO-ENTMCNC: 32.7 G/DL (ref 31.5–35.7)
MCV RBC AUTO: 81.2 FL (ref 79–97)
MONOCYTES # BLD AUTO: 0.66 10*3/MM3 (ref 0.1–0.9)
MONOCYTES NFR BLD AUTO: 9.6 % (ref 5–12)
NEUTROPHILS NFR BLD AUTO: 4.4 10*3/MM3 (ref 1.7–7)
NEUTROPHILS NFR BLD AUTO: 63.6 % (ref 42.7–76)
NRBC BLD AUTO-RTO: 0 /100 WBC (ref 0–0.2)
PLATELET # BLD AUTO: 238 10*3/MM3 (ref 140–450)
PMV BLD AUTO: 9.9 FL (ref 6–12)
POTASSIUM SERPL-SCNC: 4.1 MMOL/L (ref 3.5–5.2)
PROTHROMBIN TIME: 12.8 SECONDS (ref 11.8–14.8)
RBC # BLD AUTO: 5.73 10*6/MM3 (ref 4.14–5.8)
SODIUM SERPL-SCNC: 142 MMOL/L (ref 136–145)
WBC NRBC COR # BLD AUTO: 6.91 10*3/MM3 (ref 3.4–10.8)

## 2024-05-02 PROCEDURE — 85025 COMPLETE CBC W/AUTO DIFF WBC: CPT

## 2024-05-02 PROCEDURE — 71046 X-RAY EXAM CHEST 2 VIEWS: CPT

## 2024-05-02 PROCEDURE — 80048 BASIC METABOLIC PNL TOTAL CA: CPT

## 2024-05-02 PROCEDURE — 36415 COLL VENOUS BLD VENIPUNCTURE: CPT

## 2024-05-02 PROCEDURE — 85610 PROTHROMBIN TIME: CPT

## 2024-05-02 PROCEDURE — 85730 THROMBOPLASTIN TIME PARTIAL: CPT

## 2024-05-02 NOTE — DISCHARGE INSTRUCTIONS
Preparing for Surgery  Follow these instructions before the procedure:  Several days or weeks before your procedure  Ask your health care provider about:  Changing or stopping your regular medicines. This is especially important if you are taking diabetes medicines or blood thinners.  Taking medicines such as aspirin and ibuprofen. These medicines can thin your blood. Do not take these medicines unless your health care provider tells you to take them.  Taking over-the-counter medicines, vitamins, herbs, and supplements.    Contact your surgeon if you:  Develop a fever of more than 100.4°F (38°C) or other feelings of illness during the 48 hours before your surgery.  Have symptoms that get worse.  Have questions or concerns about your surgery.  If you are going home the same day of your surgery you will need to arrange for a responsible adult, age 18 years old or older, to drive you home from the hospital and stay with you for 24 hours. Verification of the  will be made prior to any procedure requiring sedation. You may not go home in a taxi or any form of public transportation by yourself.     Day before your procedure  Medication(s) you need to stop the day before your surgery:Losartan-hydrochlorothiazide     24 hours before your procedure DO NOT drink alcoholic beverages or smoke.  24 hours before your procedure STOP taking Erectile Dysfunction medication (i.e.,Cialis, Viagra)   You may be asked to shower with a germ-killing soap.  Day of your procedure   You may take the following medication(s) the morning of surgery with a sip of water: Carvedilol (Coreg)      8 hours before your procedure STOP all food, any dairy products, and full liquids. This includes hard candy, chewing gum or mints. This is extremely important to prevent serious complications.     Up to 2 hours before your scheduled arrival time, you may have clear liquids no cream, powder, or pulp of any kind. Safe options are water, black coffee,  plain tea, soda, Gatorade/Powerade, clear broth, apple juice.    2 hours before your scheduled arrival time, STOP drinking clear liquids.    You may need to take another shower with a germ-killing soap before you leave home in the morning. Do not use perfumes, colognes, or body lotions.  Wear comfortable loose-fitting clothing.  Remove all jewelry including body piercing and rings, dark colored nail polish, and make up prior to arrival at the hospital. Leave all valuables at home.   Bring your hearing aids if you rely on them.  Do not wear contact lenses. If you wear eyeglasses remember to bring a case to store them in while you are in surgery.  Do not use denture adhesives since you will be asked to remove them during your surgery.    You do not need to bring your home medications into the hospital.   Bring your sleep apnea device with you on the day of your surgery (if this applies to you).  If you wear portable oxygen, bring it with you.   If you are staying overnight, you may bring a bag of items you may need such as slippers, robe and a change of clothes for your discharge. You may want to leave these items in the car until you are ready for them since your family will take your belongings when you leave the pre-operative area.  Arrive at the hospital as scheduled by the office. You will be asked to arrive 2 hours prior to your surgery time in order to prepare for your procedure.  When you arrive at the hospital  Go to the registration desk located at the main entrance of the hospital.  After registration is completed, you will be given a beeper and a sticker sheet. Take the stickers to Outpatient Surgery and place in the tray at the end of the desk to notify the staff that you have arrived and registered.   Return to the lobby to wait. You are not always called back according to the time of arrival but rather the time your doctor will be ready.  When your beeper lights up and vibrates proceed through the  double doors, under the stairs, and a member of the Outpatient Surgery staff will escort you to your preoperative room.   How to Use Chlorhexidine Before Surgery  Chlorhexidine gluconate (CHG) is a germ-killing (antiseptic) solution that is used to clean the skin. It can get rid of the bacteria that normally live on the skin and can keep them away for about 24 hours. To clean your skin with CHG, you may be given:  A CHG solution to use in the shower or as part of a sponge bath.  A prepackaged cloth that contains CHG.  Cleaning your skin with CHG may help lower the risk for infection:  While you are staying in the intensive care unit of the hospital.  If you have a vascular access, such as a central line, to provide short-term or long-term access to your veins.  If you have a catheter to drain urine from your bladder.  If you are on a ventilator. A ventilator is a machine that helps you breathe by moving air in and out of your lungs.  After surgery.  What are the risks?  Risks of using CHG include:  A skin reaction.  Hearing loss, if CHG gets in your ears and you have a perforated eardrum.  Eye injury, if CHG gets in your eyes and is not rinsed out.  The CHG product catching fire.  Make sure that you avoid smoking and flames after applying CHG to your skin.  Do not use CHG:  If you have a chlorhexidine allergy or have previously reacted to chlorhexidine.  On babies younger than 2 months of age.  How to use CHG solution  Use CHG only as told by your health care provider, and follow the instructions on the label.  Use the full amount of CHG as directed. Usually, this is one bottle.  During a shower    Follow these steps when using CHG solution during a shower (unless your health care provider gives you different instructions):  Start the shower.  Use your normal soap and shampoo to wash your face and hair.  Turn off the shower or move out of the shower stream.  Pour the CHG onto a clean washcloth. Do not use any type  of brush or rough-edged sponge.  Starting at your neck, lather your body down to your toes. Make sure you follow these instructions:  If you will be having surgery, pay special attention to the part of your body where you will be having surgery. Scrub this area for at least 1 minute.  Do not use CHG on your head or face. If the solution gets into your ears or eyes, rinse them well with water.  Avoid your genital area.  Avoid any areas of skin that have broken skin, cuts, or scrapes.  Scrub your back and under your arms. Make sure to wash skin folds.  Let the lather sit on your skin for 1-2 minutes or as long as told by your health care provider.  Thoroughly rinse your entire body in the shower. Make sure that all body creases and crevices are rinsed well.  Dry off with a clean towel. Do not put any substances on your body afterward--such as powder, lotion, or perfume--unless you are told to do so by your health care provider. Only use lotions that are recommended by the .  Put on clean clothes or pajamas.  If it is the night before your surgery, sleep in clean sheets.     During a sponge bath  Follow these steps when using CHG solution during a sponge bath (unless your health care provider gives you different instructions):  Use your normal soap and shampoo to wash your face and hair.  Pour the CHG onto a clean washcloth.  Starting at your neck, lather your body down to your toes. Make sure you follow these instructions:  If you will be having surgery, pay special attention to the part of your body where you will be having surgery. Scrub this area for at least 1 minute.  Do not use CHG on your head or face. If the solution gets into your ears or eyes, rinse them well with water.  Avoid your genital area.  Avoid any areas of skin that have broken skin, cuts, or scrapes.  Scrub your back and under your arms. Make sure to wash skin folds.  Let the lather sit on your skin for 1-2 minutes or as long as told  by your health care provider.  Using a different clean, wet washcloth, thoroughly rinse your entire body. Make sure that all body creases and crevices are rinsed well.  Dry off with a clean towel. Do not put any substances on your body afterward--such as powder, lotion, or perfume--unless you are told to do so by your health care provider. Only use lotions that are recommended by the .  Put on clean clothes or pajamas.  If it is the night before your surgery, sleep in clean sheets.  How to use CHG prepackaged cloths  Only use CHG cloths as told by your health care provider, and follow the instructions on the label.  Use the CHG cloth on clean, dry skin.  Do not use the CHG cloth on your head or face unless your health care provider tells you to.  When washing with the CHG cloth:  Avoid your genital area.  Avoid any areas of skin that have broken skin, cuts, or scrapes.  Before surgery    Follow these steps when using a CHG cloth to clean before surgery (unless your health care provider gives you different instructions):  Using the CHG cloth, vigorously scrub the part of your body where you will be having surgery. Scrub using a back-and-forth motion for 3 minutes. The area on your body should be completely wet with CHG when you are done scrubbing.  Do not rinse. Discard the cloth and let the area air-dry. Do not put any substances on the area afterward, such as powder, lotion, or perfume.  Put on clean clothes or pajamas.  If it is the night before your surgery, sleep in clean sheets.     For general bathing  Follow these steps when using CHG cloths for general bathing (unless your health care provider gives you different instructions).  Use a separate CHG cloth for each area of your body. Make sure you wash between any folds of skin and between your fingers and toes. Wash your body in the following order, switching to a new cloth after each step:  The front of your neck, shoulders, and chest.  Both of  your arms, under your arms, and your hands.  Your stomach and groin area, avoiding the genitals.  Your right leg and foot.  Your left leg and foot.  The back of your neck, your back, and your buttocks.  Do not rinse. Discard the cloth and let the area air-dry. Do not put any substances on your body afterward--such as powder, lotion, or perfume--unless you are told to do so by your health care provider. Only use lotions that are recommended by the .  Put on clean clothes or pajamas.  Contact a health care provider if:  Your skin gets irritated after scrubbing.  You have questions about using your solution or cloth.  You swallow any chlorhexidine. Call your local poison control center (1-752.695.5985 in the [place]  Get help right away if:  Your eyes itch badly, or they become very red or swollen.  Your skin itches badly and is red or swollen.  Your hearing changes.  You have trouble seeing.  You have swelling or tingling in your mouth or throat.  You have trouble breathing.  These symptoms may represent a serious problem that is an emergency. Do not wait to see if the symptoms will go away. Get medical help right away. Call your local emergency services (076 in the [place] Do not drive yourself to the hospital.  Summary  Chlorhexidine gluconate (CHG) is a germ-killing (antiseptic) solution that is used to clean the skin. Cleaning your skin with CHG may help to lower your risk for infection.  You may be given CHG to use for bathing. It may be in a bottle or in a prepackaged cloth to use on your skin. Carefully follow your health care provider's instructions and the instructions on the product label.  Do not use CHG if you have a chlorhexidine allergy.  Contact your health care provider if your skin gets irritated after scrubbing.  This information is not intended to replace advice given to you by your health care provider. Make sure you discuss any questions you have with your health care provider.  Document  Revised: 04/17/2023 Document Reviewed: 02/28/2022  [employer]vier Patient Education © 2023 [employer]

## 2024-05-05 NOTE — H&P
2024        Tim Peguero MD  [address] [place] [id number]     Daniele Delong  1950          Chief Complaint   Patient presents with    BILATERAL CAROTID ARTERY STENOSIS       No stroke symptoms, lightheaded at times         Dear Tim Peguero MD   HPI  I had the pleasure of seeing your patient Daniele Delong in the office today.    As you recall, Daniele Delong is a 74 y.o.  male who you are currently following for routine health maintenance.  He does have a history of stroke with dizziness and blurred vision.  He was a previous smoker but quit in 2019.  He is followed by Dr. Mcginnis for an ascending aortic aneurysm, last seen 24.  He has previous left carotid endarterectomy many years ago out of town.  He is maintained on aspirin and Lipitor.  He did have noninvasive testing performed which I did review in office.       Past Medical History:   Diagnosis Date    Carotid stenosis     Coronary artery disease     Heart murmur     Hyperlipidemia     Hypertension     Myocardial infarction     Sleep apnea     Stroke     Thoracic aortic aneurysm      Past Surgical History:   Procedure Laterality Date    APPENDECTOMY      CAROTID ENDARTERECTOMY Left     CHOLECYSTECTOMY      COLON SURGERY      GTUBE INSERTION      PEG TUBE REMOVAL       Social History     Socioeconomic History    Marital status: Single   Tobacco Use    Smoking status: Former     Current packs/day: 0.00     Types: Cigarettes     Quit date: 10/27/2019     Years since quittin.5    Smokeless tobacco: Never   Vaping Use    Vaping status: Never Used   Substance and Sexual Activity    Alcohol use: No    Drug use: No    Sexual activity: Defer     Family History   Problem Relation Age of Onset    Heart attack Father     Heart disease Father      Current Outpatient Medications   Medication Instructions    amLODIPine (NORVASC) 5 mg, Oral, Daily    aspirin 81 mg, Oral, Daily    atorvastatin (LIPITOR) 80 mg, Oral,  "Daily    carvedilol (COREG) 6.25 MG tablet 1 tablet, Oral, 2 times daily    cloNIDine (CATAPRES) 0.1 mg, Oral, 2 Times Daily PRN    clopidogrel (PLAVIX) 75 mg, Oral, Daily    losartan-hydrochlorothiazide (HYZAAR) 100-12.5 MG per tablet 1 tablet, Oral, Daily    vitamin B-12 (CYANOCOBALAMIN) 1,000 mcg, Oral, Daily     Allergies   Allergen Reactions    Lisinopril Cough     cough       Review of Systems   Constitutional: Negative.    HENT: Negative.     Eyes: Negative.    Respiratory: Negative.     Cardiovascular: Negative.    Gastrointestinal: Negative.    Endocrine: Negative.    Genitourinary: Negative.    Musculoskeletal: Negative.    Skin: Negative.    Allergic/Immunologic: Negative.    Neurological:  Positive for dizziness and numbness (To his feet at times).   Hematological: Negative.    Psychiatric/Behavioral: Negative.     All other systems reviewed and are negative.        /82   Pulse 68   Ht 170.2 cm (67\")   Wt 88 kg (194 lb)   SpO2 98%   BMI 30.38 kg/m²    Physical Exam  Vitals and nursing note reviewed.   Constitutional:       Appearance: Normal appearance. He is well-developed. He is obese.   HENT:      Head: Normocephalic and atraumatic.   Eyes:      General: No scleral icterus.     Pupils: Pupils are equal, round, and reactive to light.   Neck:      Thyroid: No thyromegaly.   Cardiovascular:      Rate and Rhythm: Normal rate and regular rhythm.      Heart sounds: Normal heart sounds.   Pulmonary:      Effort: Pulmonary effort is normal.      Breath sounds: Normal breath sounds.   Abdominal:      General: Bowel sounds are normal.      Palpations: Abdomen is soft.   Musculoskeletal:         General: Normal range of motion.      Cervical back: Normal range of motion and neck supple.   Skin:     General: Skin is warm and dry.   Neurological:      Mental Status: He is alert and oriented to person, place, and time.   Psychiatric:         Mood and Affect: Mood normal.         Behavior: Behavior " normal.         Thought Content: Thought content normal.         Judgment: Judgment normal.            Diagnostic Data:                  Problem List       Patient Active Problem List   Diagnosis    Paresthesias    Essential hypertension    Oropharyngeal dysphagia    Other hyperlipidemia    Thoracic aortic aneurysm without rupture              Diagnosis Plan   1. Bilateral carotid artery stenosis  Ambulatory Referral to Cardiology       2. Essential hypertension          3. Other hyperlipidemia          4. Aneurysm of ascending aorta without rupture                   Plan: After thoroughly evaluating Daniele Delong, I believe the best course of action is to proceed with a left TCAR for stroke risk reduction.  He has previously undergone a left carotid endarterectomy.  He does have significant stenosis of about 80% upon review.  Risks of transcarotid artery revascularization include, but are not limited to, bleeding, infection, vessel damage, nerve damage, MI, stroke, and death.  The patient understands these risks and would like to proceed with the procedure.  I will add Plavix for him to begin.  He will continue aspirin, Plavix, and Lipitor uninterrupted prior to surgery.  I will refer to cardiology and once I receive clearance we will schedule him appropriately.  I did discuss vascular risk factors as they pertain to the progression of vascular disease including controlling his hypertension and hyperlipidemia.  His blood pressure is elevated in office at 160/82.  He should continue to monitor and contact his primary care provider if this continues.  He will also continue on his aspirin 81 mg daily, Plavix 75 mg daily, and Lipitor 80 mg daily in addition to his other medications.   This was all discussed in full with complete understanding.  Thank you for allowing me to participate in the care of your patient.  Please do not hesitate to call with any questions or concerns.  We will keep you aware of any further  encounters with Daniele SHAYY Baljeet.         Sincerely yours,           DO Sudhir Yi Jonathan E, MD

## 2024-05-08 ENCOUNTER — TELEPHONE (OUTPATIENT)
Dept: VASCULAR SURGERY | Facility: CLINIC | Age: 74
End: 2024-05-08
Payer: MEDICARE

## 2024-05-09 ENCOUNTER — ANESTHESIA (OUTPATIENT)
Dept: PERIOP | Facility: HOSPITAL | Age: 74
End: 2024-05-09
Payer: MEDICARE

## 2024-05-09 ENCOUNTER — APPOINTMENT (OUTPATIENT)
Dept: INTERVENTIONAL RADIOLOGY/VASCULAR | Facility: HOSPITAL | Age: 74
End: 2024-05-09
Payer: MEDICARE

## 2024-05-09 ENCOUNTER — HOSPITAL ENCOUNTER (INPATIENT)
Facility: HOSPITAL | Age: 74
LOS: 1 days | Discharge: HOME | End: 2024-05-10
Attending: SURGERY | Admitting: SURGERY
Payer: MEDICARE

## 2024-05-09 ENCOUNTER — ANESTHESIA EVENT (OUTPATIENT)
Dept: PERIOP | Facility: HOSPITAL | Age: 74
End: 2024-05-09
Payer: MEDICARE

## 2024-05-09 DIAGNOSIS — I65.22 STENOSIS OF LEFT CAROTID ARTERY: ICD-10-CM

## 2024-05-09 DIAGNOSIS — Z01.818 PREOP TESTING: ICD-10-CM

## 2024-05-09 LAB
ABO GROUP BLD: NORMAL
ACT BLD: 293 SECONDS (ref 82–152)
BLD GP AB SCN SERPL QL: NEGATIVE
RH BLD: POSITIVE
T&S EXPIRATION DATE: NORMAL

## 2024-05-09 PROCEDURE — 85347 COAGULATION TIME ACTIVATED: CPT

## 2024-05-09 PROCEDURE — 25010000002 BUPIVACAINE 0.5 % SOLUTION: Performed by: SURGERY

## 2024-05-09 PROCEDURE — 25010000002 GLYCOPYRROLATE 0.4 MG/2ML SOLUTION: Performed by: NURSE ANESTHETIST, CERTIFIED REGISTERED

## 2024-05-09 PROCEDURE — C1769 GUIDE WIRE: HCPCS | Performed by: SURGERY

## 2024-05-09 PROCEDURE — 76000 FLUOROSCOPY <1 HR PHYS/QHP: CPT

## 2024-05-09 PROCEDURE — 25010000002 DEXAMETHASONE PER 1 MG: Performed by: NURSE ANESTHETIST, CERTIFIED REGISTERED

## 2024-05-09 PROCEDURE — C1876 STENT, NON-COA/NON-COV W/DEL: HCPCS | Performed by: SURGERY

## 2024-05-09 PROCEDURE — C1725 CATH, TRANSLUMIN NON-LASER: HCPCS | Performed by: SURGERY

## 2024-05-09 PROCEDURE — 86901 BLOOD TYPING SEROLOGIC RH(D): CPT | Performed by: SURGERY

## 2024-05-09 PROCEDURE — 25010000002 CEFAZOLIN PER 500 MG: Performed by: SURGERY

## 2024-05-09 PROCEDURE — 25010000002 PROTAMINE SULFATE PER 10 MG: Performed by: NURSE ANESTHETIST, CERTIFIED REGISTERED

## 2024-05-09 PROCEDURE — 86850 RBC ANTIBODY SCREEN: CPT | Performed by: SURGERY

## 2024-05-09 PROCEDURE — 0 IOVERSOL 74 % SOLUTION: Performed by: SURGERY

## 2024-05-09 PROCEDURE — X2AJ336 CEREBRAL EMBOLIC FILTRATION, EXTRACORPOREAL FLOW REVERSAL CIRCUIT FROM LEFT COMMON CAROTID ARTERY, PERCUTANEOUS APPROACH, NEW TECHNOLOGY GROUP 6: ICD-10-PCS | Performed by: SURGERY

## 2024-05-09 PROCEDURE — 037L3DZ DILATION OF LEFT INTERNAL CAROTID ARTERY WITH INTRALUMINAL DEVICE, PERCUTANEOUS APPROACH: ICD-10-PCS | Performed by: SURGERY

## 2024-05-09 PROCEDURE — 25010000002 PROPOFOL 10 MG/ML EMULSION: Performed by: NURSE ANESTHETIST, CERTIFIED REGISTERED

## 2024-05-09 PROCEDURE — 25010000002 SUGAMMADEX 200 MG/2ML SOLUTION: Performed by: NURSE ANESTHETIST, CERTIFIED REGISTERED

## 2024-05-09 PROCEDURE — 25010000002 NITROGLYCERIN 200 MCG/ML SOLUTION: Performed by: NURSE ANESTHETIST, CERTIFIED REGISTERED

## 2024-05-09 PROCEDURE — C1894 INTRO/SHEATH, NON-LASER: HCPCS | Performed by: SURGERY

## 2024-05-09 PROCEDURE — 86900 BLOOD TYPING SEROLOGIC ABO: CPT | Performed by: SURGERY

## 2024-05-09 PROCEDURE — 25810000003 LACTATED RINGERS PER 1000 ML: Performed by: SURGERY

## 2024-05-09 PROCEDURE — 25010000002 ONDANSETRON PER 1 MG: Performed by: NURSE ANESTHETIST, CERTIFIED REGISTERED

## 2024-05-09 PROCEDURE — 25010000002 HEPARIN (PORCINE) PER 1000 UNITS: Performed by: SURGERY

## 2024-05-09 PROCEDURE — 37215 TRANSCATH STENT CCA W/EPS: CPT | Performed by: SURGERY

## 2024-05-09 PROCEDURE — 25010000002 HEPARIN (PORCINE) PER 1000 UNITS: Performed by: NURSE ANESTHETIST, CERTIFIED REGISTERED

## 2024-05-09 RX ORDER — ONDANSETRON 2 MG/ML
4 INJECTION INTRAMUSCULAR; INTRAVENOUS ONCE AS NEEDED
Status: DISCONTINUED | OUTPATIENT
Start: 2024-05-09 | End: 2024-05-09 | Stop reason: HOSPADM

## 2024-05-09 RX ORDER — FENTANYL CITRATE 50 UG/ML
50 INJECTION, SOLUTION INTRAMUSCULAR; INTRAVENOUS
Status: DISCONTINUED | OUTPATIENT
Start: 2024-05-09 | End: 2024-05-09 | Stop reason: HOSPADM

## 2024-05-09 RX ORDER — SODIUM CHLORIDE 0.9 % (FLUSH) 0.9 %
3-10 SYRINGE (ML) INJECTION AS NEEDED
Status: DISCONTINUED | OUTPATIENT
Start: 2024-05-09 | End: 2024-05-09 | Stop reason: HOSPADM

## 2024-05-09 RX ORDER — SODIUM CHLORIDE, SODIUM LACTATE, POTASSIUM CHLORIDE, CALCIUM CHLORIDE 600; 310; 30; 20 MG/100ML; MG/100ML; MG/100ML; MG/100ML
1000 INJECTION, SOLUTION INTRAVENOUS CONTINUOUS
Status: DISCONTINUED | OUTPATIENT
Start: 2024-05-09 | End: 2024-05-09

## 2024-05-09 RX ORDER — CARVEDILOL 6.25 MG/1
6.25 TABLET ORAL 2 TIMES DAILY WITH MEALS
Status: DISCONTINUED | OUTPATIENT
Start: 2024-05-09 | End: 2024-05-10 | Stop reason: HOSPADM

## 2024-05-09 RX ORDER — DROPERIDOL 2.5 MG/ML
0.62 INJECTION, SOLUTION INTRAMUSCULAR; INTRAVENOUS ONCE AS NEEDED
Status: DISCONTINUED | OUTPATIENT
Start: 2024-05-09 | End: 2024-05-09 | Stop reason: HOSPADM

## 2024-05-09 RX ORDER — LABETALOL HYDROCHLORIDE 5 MG/ML
10 INJECTION, SOLUTION INTRAVENOUS
Status: DISCONTINUED | OUTPATIENT
Start: 2024-05-09 | End: 2024-05-10 | Stop reason: HOSPADM

## 2024-05-09 RX ORDER — METOPROLOL TARTRATE 1 MG/ML
INJECTION, SOLUTION INTRAVENOUS AS NEEDED
Status: DISCONTINUED | OUTPATIENT
Start: 2024-05-09 | End: 2024-05-09 | Stop reason: SURG

## 2024-05-09 RX ORDER — PROPOFOL 10 MG/ML
VIAL (ML) INTRAVENOUS AS NEEDED
Status: DISCONTINUED | OUTPATIENT
Start: 2024-05-09 | End: 2024-05-09 | Stop reason: SURG

## 2024-05-09 RX ORDER — DEXAMETHASONE SODIUM PHOSPHATE 4 MG/ML
INJECTION, SOLUTION INTRA-ARTICULAR; INTRALESIONAL; INTRAMUSCULAR; INTRAVENOUS; SOFT TISSUE AS NEEDED
Status: DISCONTINUED | OUTPATIENT
Start: 2024-05-09 | End: 2024-05-09 | Stop reason: SURG

## 2024-05-09 RX ORDER — SODIUM CHLORIDE 0.9 % (FLUSH) 0.9 %
3 SYRINGE (ML) INJECTION EVERY 12 HOURS SCHEDULED
Status: DISCONTINUED | OUTPATIENT
Start: 2024-05-09 | End: 2024-05-09 | Stop reason: HOSPADM

## 2024-05-09 RX ORDER — HEPARIN SODIUM 1000 [USP'U]/ML
INJECTION, SOLUTION INTRAVENOUS; SUBCUTANEOUS AS NEEDED
Status: DISCONTINUED | OUTPATIENT
Start: 2024-05-09 | End: 2024-05-09 | Stop reason: SURG

## 2024-05-09 RX ORDER — LIDOCAINE HYDROCHLORIDE 10 MG/ML
0.5 INJECTION, SOLUTION EPIDURAL; INFILTRATION; INTRACAUDAL; PERINEURAL ONCE AS NEEDED
Status: DISCONTINUED | OUTPATIENT
Start: 2024-05-09 | End: 2024-05-09 | Stop reason: HOSPADM

## 2024-05-09 RX ORDER — SODIUM CHLORIDE 0.9 % (FLUSH) 0.9 %
3 SYRINGE (ML) INJECTION AS NEEDED
Status: DISCONTINUED | OUTPATIENT
Start: 2024-05-09 | End: 2024-05-09 | Stop reason: HOSPADM

## 2024-05-09 RX ORDER — ASPIRIN 81 MG/1
81 TABLET, CHEWABLE ORAL DAILY
Status: DISCONTINUED | OUTPATIENT
Start: 2024-05-09 | End: 2024-05-10 | Stop reason: HOSPADM

## 2024-05-09 RX ORDER — NALOXONE HCL 0.4 MG/ML
0.4 VIAL (ML) INJECTION
Status: DISCONTINUED | OUTPATIENT
Start: 2024-05-09 | End: 2024-05-10 | Stop reason: HOSPADM

## 2024-05-09 RX ORDER — SODIUM CHLORIDE 0.9 % (FLUSH) 0.9 %
10 SYRINGE (ML) INJECTION AS NEEDED
Status: DISCONTINUED | OUTPATIENT
Start: 2024-05-09 | End: 2024-05-09 | Stop reason: HOSPADM

## 2024-05-09 RX ORDER — HYDROCODONE BITARTRATE AND ACETAMINOPHEN 5; 325 MG/1; MG/1
1 TABLET ORAL EVERY 4 HOURS PRN
Status: DISCONTINUED | OUTPATIENT
Start: 2024-05-09 | End: 2024-05-10 | Stop reason: HOSPADM

## 2024-05-09 RX ORDER — ROCURONIUM BROMIDE 10 MG/ML
INJECTION, SOLUTION INTRAVENOUS AS NEEDED
Status: DISCONTINUED | OUTPATIENT
Start: 2024-05-09 | End: 2024-05-09 | Stop reason: SURG

## 2024-05-09 RX ORDER — GLYCOPYRROLATE 0.2 MG/ML
INJECTION INTRAMUSCULAR; INTRAVENOUS AS NEEDED
Status: DISCONTINUED | OUTPATIENT
Start: 2024-05-09 | End: 2024-05-09 | Stop reason: SURG

## 2024-05-09 RX ORDER — NALOXONE HCL 0.4 MG/ML
0.4 VIAL (ML) INJECTION AS NEEDED
Status: DISCONTINUED | OUTPATIENT
Start: 2024-05-09 | End: 2024-05-09 | Stop reason: HOSPADM

## 2024-05-09 RX ORDER — SODIUM CHLORIDE 0.9 % (FLUSH) 0.9 %
10 SYRINGE (ML) INJECTION AS NEEDED
Status: DISCONTINUED | OUTPATIENT
Start: 2024-05-09 | End: 2024-05-10 | Stop reason: HOSPADM

## 2024-05-09 RX ORDER — ONDANSETRON 4 MG/1
4 TABLET, ORALLY DISINTEGRATING ORAL EVERY 6 HOURS PRN
Status: DISCONTINUED | OUTPATIENT
Start: 2024-05-09 | End: 2024-05-10 | Stop reason: HOSPADM

## 2024-05-09 RX ORDER — CHOLECALCIFEROL (VITAMIN D3) 125 MCG
1000 CAPSULE ORAL DAILY
Status: DISCONTINUED | OUTPATIENT
Start: 2024-05-09 | End: 2024-05-10 | Stop reason: HOSPADM

## 2024-05-09 RX ORDER — HYDROCHLOROTHIAZIDE 25 MG/1
12.5 TABLET ORAL
Status: DISCONTINUED | OUTPATIENT
Start: 2024-05-09 | End: 2024-05-10 | Stop reason: HOSPADM

## 2024-05-09 RX ORDER — HYDROCODONE BITARTRATE AND ACETAMINOPHEN 10; 325 MG/1; MG/1
1 TABLET ORAL EVERY 4 HOURS PRN
Status: DISCONTINUED | OUTPATIENT
Start: 2024-05-09 | End: 2024-05-09 | Stop reason: HOSPADM

## 2024-05-09 RX ORDER — LIDOCAINE HYDROCHLORIDE 20 MG/ML
INJECTION, SOLUTION EPIDURAL; INFILTRATION; INTRACAUDAL; PERINEURAL AS NEEDED
Status: DISCONTINUED | OUTPATIENT
Start: 2024-05-09 | End: 2024-05-09 | Stop reason: SURG

## 2024-05-09 RX ORDER — CLOPIDOGREL BISULFATE 75 MG/1
75 TABLET ORAL DAILY
Status: DISCONTINUED | OUTPATIENT
Start: 2024-05-09 | End: 2024-05-10 | Stop reason: HOSPADM

## 2024-05-09 RX ORDER — PSEUDOEPHEDRINE HCL 30 MG
60 TABLET ORAL EVERY 4 HOURS PRN
Status: DISCONTINUED | OUTPATIENT
Start: 2024-05-09 | End: 2024-05-10 | Stop reason: HOSPADM

## 2024-05-09 RX ORDER — LOSARTAN POTASSIUM 50 MG/1
100 TABLET ORAL
Status: DISCONTINUED | OUTPATIENT
Start: 2024-05-09 | End: 2024-05-10 | Stop reason: HOSPADM

## 2024-05-09 RX ORDER — SODIUM CHLORIDE 9 MG/ML
40 INJECTION, SOLUTION INTRAVENOUS AS NEEDED
Status: DISCONTINUED | OUTPATIENT
Start: 2024-05-09 | End: 2024-05-10 | Stop reason: HOSPADM

## 2024-05-09 RX ORDER — ACETAMINOPHEN 325 MG/1
650 TABLET ORAL EVERY 4 HOURS PRN
Status: DISCONTINUED | OUTPATIENT
Start: 2024-05-09 | End: 2024-05-10 | Stop reason: HOSPADM

## 2024-05-09 RX ORDER — FLUMAZENIL 0.1 MG/ML
0.2 INJECTION INTRAVENOUS AS NEEDED
Status: DISCONTINUED | OUTPATIENT
Start: 2024-05-09 | End: 2024-05-09 | Stop reason: HOSPADM

## 2024-05-09 RX ORDER — PHENYLEPHRINE HCL IN 0.9% NACL 50MG/250ML
.5-3 PLASTIC BAG, INJECTION (ML) INTRAVENOUS
Status: DISCONTINUED | OUTPATIENT
Start: 2024-05-09 | End: 2024-05-09 | Stop reason: HOSPADM

## 2024-05-09 RX ORDER — CLONIDINE HYDROCHLORIDE 0.1 MG/1
0.1 TABLET ORAL 2 TIMES DAILY PRN
Status: DISCONTINUED | OUTPATIENT
Start: 2024-05-09 | End: 2024-05-10 | Stop reason: HOSPADM

## 2024-05-09 RX ORDER — HYDROCODONE BITARTRATE AND ACETAMINOPHEN 5; 325 MG/1; MG/1
1 TABLET ORAL EVERY 4 HOURS PRN
Status: DISCONTINUED | OUTPATIENT
Start: 2024-05-09 | End: 2024-05-09 | Stop reason: HOSPADM

## 2024-05-09 RX ORDER — BUPIVACAINE HYDROCHLORIDE 5 MG/ML
INJECTION, SOLUTION PERINEURAL AS NEEDED
Status: DISCONTINUED | OUTPATIENT
Start: 2024-05-09 | End: 2024-05-09 | Stop reason: HOSPADM

## 2024-05-09 RX ORDER — ONDANSETRON 2 MG/ML
4 INJECTION INTRAMUSCULAR; INTRAVENOUS EVERY 6 HOURS PRN
Status: DISCONTINUED | OUTPATIENT
Start: 2024-05-09 | End: 2024-05-10 | Stop reason: HOSPADM

## 2024-05-09 RX ORDER — LABETALOL HYDROCHLORIDE 5 MG/ML
5 INJECTION, SOLUTION INTRAVENOUS
Status: DISCONTINUED | OUTPATIENT
Start: 2024-05-09 | End: 2024-05-09 | Stop reason: HOSPADM

## 2024-05-09 RX ORDER — SODIUM CHLORIDE, SODIUM LACTATE, POTASSIUM CHLORIDE, CALCIUM CHLORIDE 600; 310; 30; 20 MG/100ML; MG/100ML; MG/100ML; MG/100ML
100 INJECTION, SOLUTION INTRAVENOUS CONTINUOUS
Status: DISCONTINUED | OUTPATIENT
Start: 2024-05-09 | End: 2024-05-09

## 2024-05-09 RX ORDER — PROTAMINE SULFATE 10 MG/ML
INJECTION, SOLUTION INTRAVENOUS AS NEEDED
Status: DISCONTINUED | OUTPATIENT
Start: 2024-05-09 | End: 2024-05-09 | Stop reason: SURG

## 2024-05-09 RX ORDER — SODIUM CHLORIDE 9 MG/ML
40 INJECTION, SOLUTION INTRAVENOUS AS NEEDED
Status: DISCONTINUED | OUTPATIENT
Start: 2024-05-09 | End: 2024-05-09 | Stop reason: HOSPADM

## 2024-05-09 RX ORDER — ATORVASTATIN CALCIUM 40 MG/1
80 TABLET, FILM COATED ORAL DAILY
Status: DISCONTINUED | OUTPATIENT
Start: 2024-05-09 | End: 2024-05-10 | Stop reason: HOSPADM

## 2024-05-09 RX ORDER — SODIUM CHLORIDE 0.9 % (FLUSH) 0.9 %
10 SYRINGE (ML) INJECTION EVERY 12 HOURS SCHEDULED
Status: DISCONTINUED | OUTPATIENT
Start: 2024-05-09 | End: 2024-05-10 | Stop reason: HOSPADM

## 2024-05-09 RX ORDER — HYDRALAZINE HYDROCHLORIDE 20 MG/ML
10 INJECTION INTRAMUSCULAR; INTRAVENOUS EVERY 4 HOURS PRN
Status: DISCONTINUED | OUTPATIENT
Start: 2024-05-09 | End: 2024-05-10 | Stop reason: HOSPADM

## 2024-05-09 RX ORDER — AMLODIPINE BESYLATE 5 MG/1
5 TABLET ORAL DAILY
Status: DISCONTINUED | OUTPATIENT
Start: 2024-05-09 | End: 2024-05-10 | Stop reason: HOSPADM

## 2024-05-09 RX ORDER — ONDANSETRON 2 MG/ML
INJECTION INTRAMUSCULAR; INTRAVENOUS AS NEEDED
Status: DISCONTINUED | OUTPATIENT
Start: 2024-05-09 | End: 2024-05-09 | Stop reason: SURG

## 2024-05-09 RX ADMIN — SUGAMMADEX 200 MG: 100 INJECTION, SOLUTION INTRAVENOUS at 09:58

## 2024-05-09 RX ADMIN — CARVEDILOL 6.25 MG: 6.25 TABLET, FILM COATED ORAL at 17:53

## 2024-05-09 RX ADMIN — LIDOCAINE HYDROCHLORIDE 100 MG: 20 INJECTION, SOLUTION EPIDURAL; INFILTRATION; INTRACAUDAL; PERINEURAL at 09:14

## 2024-05-09 RX ADMIN — HYDROCHLOROTHIAZIDE 12.5 MG: 25 TABLET ORAL at 14:18

## 2024-05-09 RX ADMIN — HEPARIN SODIUM 8000 UNITS: 1000 INJECTION, SOLUTION INTRAVENOUS; SUBCUTANEOUS at 09:27

## 2024-05-09 RX ADMIN — SODIUM CHLORIDE, POTASSIUM CHLORIDE, SODIUM LACTATE AND CALCIUM CHLORIDE 1000 ML: 600; 310; 30; 20 INJECTION, SOLUTION INTRAVENOUS at 08:25

## 2024-05-09 RX ADMIN — Medication 1000 MCG: at 14:17

## 2024-05-09 RX ADMIN — ATORVASTATIN CALCIUM 80 MG: 40 TABLET ORAL at 14:17

## 2024-05-09 RX ADMIN — CEFAZOLIN 2000 MG: 2 INJECTION, POWDER, FOR SOLUTION INTRAMUSCULAR; INTRAVENOUS at 17:53

## 2024-05-09 RX ADMIN — PROPOFOL 50 MG: 10 INJECTION, EMULSION INTRAVENOUS at 09:17

## 2024-05-09 RX ADMIN — DEXAMETHASONE SODIUM PHOSPHATE 4 MG: 4 INJECTION, SOLUTION INTRAMUSCULAR; INTRAVENOUS at 09:38

## 2024-05-09 RX ADMIN — LOSARTAN POTASSIUM 100 MG: 50 TABLET, FILM COATED ORAL at 14:18

## 2024-05-09 RX ADMIN — ASPIRIN 81 MG: 81 TABLET, CHEWABLE ORAL at 14:17

## 2024-05-09 RX ADMIN — GLYCOPYRROLATE 0.2 MG: 0.2 INJECTION INTRAMUSCULAR; INTRAVENOUS at 09:24

## 2024-05-09 RX ADMIN — SODIUM CHLORIDE 40 MCG: 9 INJECTION, SOLUTION INTRAMUSCULAR; INTRAVENOUS; SUBCUTANEOUS at 09:32

## 2024-05-09 RX ADMIN — CLOPIDOGREL BISULFATE 75 MG: 75 TABLET, FILM COATED ORAL at 14:17

## 2024-05-09 RX ADMIN — AMLODIPINE BESYLATE 5 MG: 5 TABLET ORAL at 14:18

## 2024-05-09 RX ADMIN — CEFAZOLIN 2000 MG: 2 INJECTION, POWDER, FOR SOLUTION INTRAMUSCULAR; INTRAVENOUS at 09:20

## 2024-05-09 RX ADMIN — PROTAMINE SULFATE 30 MG: 10 INJECTION, SOLUTION INTRAVENOUS at 09:51

## 2024-05-09 RX ADMIN — ONDANSETRON 4 MG: 2 INJECTION INTRAMUSCULAR; INTRAVENOUS at 09:38

## 2024-05-09 RX ADMIN — METOPROLOL TARTRATE 2.5 MG: 5 INJECTION INTRAVENOUS at 10:04

## 2024-05-09 RX ADMIN — LIDOCAINE HYDROCHLORIDE 100 MG: 20 INJECTION, SOLUTION EPIDURAL; INFILTRATION; INTRACAUDAL; PERINEURAL at 09:56

## 2024-05-09 RX ADMIN — METOPROLOL TARTRATE 2.5 MG: 5 INJECTION INTRAVENOUS at 09:57

## 2024-05-09 RX ADMIN — ROCURONIUM BROMIDE 50 MG: 10 INJECTION, SOLUTION INTRAVENOUS at 09:14

## 2024-05-09 RX ADMIN — PROPOFOL 150 MG: 10 INJECTION, EMULSION INTRAVENOUS at 09:14

## 2024-05-09 NOTE — PLAN OF CARE
Goal Outcome Evaluation:  Plan of Care Reviewed With: patient        Progress: improving  Outcome Evaluation: SURGERY TODAY - LEFT T-CAR. NO NEURO DEFECITS NOTED. DRESSING NOTED TO LEFT NECK AREA WITH BLOODY DRNG. ICE PACK INTACT. HOB ELEVATED. GROIN DRESSING TO RIGHT GROIN AREA. ROOM AIR. CONT. PULSE OX INTACT. PT. DUE TO VOID. NO NEURO DEFECITS NOTED. NO DISTRESS NOTED.

## 2024-05-09 NOTE — OP NOTE
Daniele Delong  5/9/2024     PREOPERATIVE DIAGNOSIS: Stenosis of left carotid artery [I65.22]  Preop testing [Z01.818]     POSTOPERATIVE DIAGNOSIS: Post-Op Diagnosis Codes:     * Stenosis of left carotid artery [I65.22]     * Preop testing [Z01.818]     PROCEDURE PERFORMED:   1.  Left internal carotid artery TCAR (transcarotid artery revascularization) with the ENROUTE transcarotid neuro protection and stent system     SURGEON: Colton Kat DO      ANESTHESIA: General    PREPARATION: Routine.    STAFF: Circulator: Alesha Marques RN  Scrub Person: Holden Escalona; Promise Beltran RN; Zandra Brunner  Vendor Representative: Jaron Zelaya  Vascular Radiology Technician: Caroline Pollard    Estimated Blood Loss: minimal    SPECIMENS: None    COMPLICATIONS: None    INDICATIONS: Daniele Delong is a 74 y.o. male who does have a history of stroke with dizziness and blurred vision. He was a previous smoker but quit in 2019. He is followed by Dr. Mcginnis for an ascending aortic aneurysm, last seen 2/7/24. He has previous left carotid endarterectomy many years ago out of town. He is maintained on aspirin and Lipitor. He did have noninvasive testing performed which I did review in office. The indications, risks, and possible complications of the procedure were explained to the patient, who voiced understanding and wished to proceed with surgery.     PROCEDURE IN DETAIL: The patient was taken to the operating room and placed on the operating table in a supine position. After general anesthesia was obtained, the left neck and bilateral groins were prepped and draped in a sterile manner.   A 4 cm transverse incision was made between the sternal and clavicular heads of the sternocleidomastoid muscle below the omohyoid.  Following longitudinal division of the carotid sheath the jugular vein was partially dissected and retracted laterally.  Once 3 cm of common carotid artery were isolated, the vessel loop was placed around the  proximal one third of the common carotid artery under direct visualization.  A 5-0 Prolene suture was preplaced in the anterior wall the common carotid artery, in a Z stitch configuration, close to the clavicle to facilitate hemostasis upon removal of the arterial sheath at completion of the procedure.  The contralateral left common femoral vein was accessed under ultrasound guidance, using standard Seldinger and micropuncture access technique.  The venous return sheath was advanced into the common femoral vein over the advantage Glidewire.  Blood was aspirated from the flow line followed by flushing of the venous sheath with heparinized saline.  The venous sheath was secured to the patient's skin with a 2-0 silk suture to maintain optimal position in the vessel.  8000 units of intravenous heparin was given to obtain a therapeutic ACT greater than 250 seconds prior to arterial access.  A 4 Wolof micropuncture set was used, puncturing the artery with a 21-gauge needle through the preplaced Z stitch while holding gentle traction on the vessel loop to stabilize and centralize the common carotid artery within the incision.  Careful attention was paid to the change in common carotid shape when using the vessel loop to control or lift the artery.  The micropuncture wire was then advanced into the external carotid artery and the 21-gauge needle was removed.  The micropuncture sheath was advanced into the external carotid artery and the wire and dilator were removed.  Pulsatile backflow indicated correct positioning.  The J-wire was then inserted into the external carotid artery.  After micro puncture sheath removal, the transcarotid arterial sheath was advanced to the 2.5 cm marker and the J-wire and dilator were removed.    The arterial sheath position was assessed under fluoroscopy in 2 projections to ensure that the sheath tip was oriented coaxially in the common carotid artery.  The arterial sheath was sutured to the  patient in 2 separate areas.  Blood was slowly aspirated followed by flushing with heparinized saline.  Next, the flow controller was connected to the transcarotid arterial sheath, prepared by passively allowing a column of arterial blood to fill the line and connected to the venous return sheath.  The common carotid inflow was occluded proximal to the arteriotomy with a profunda clamp to achieve active flow reversal.  To confirm flow reversal, a saline bolus was delivered in the venous flow line on both high and low flow settings of the flow controller.  Angiograms were performed with slow injections of a small amount of contrast filling just past the lesion to minimize antegrade transmission of microbubbles.  Prior to lesion manipulation, heart rate and blood pressure were managed upwards to optimize flow reversal and procedural neuro protection.  The lesion was crossed with an 014 guidewire and predilatation was performed with a 5 x 25 mm Enflate balloon.  Next, primary stenting was performed with the 10-8 x 40 mm ENROUTE transcarotid tapered stent.  Imaging was performed in both in KANDI and MORRIS projections to ensure the stent was fully deployed.  Completion angiogram also showed the stent to be widely patent without any residual stenosis, dissection, or occlusion.  At TCAR case completion, antegrade flow was restored by releasing the clamp on the common carotid artery then closing the NPS stopcocks to the flow lines.  The transcarotid arterial sheath was removed and the preclosure suture was tied in place.  30 mg of protamine was given intravenously for reversal.  The venous return sheath from the right groin was removed and hemostasis was achieved with 10 to 15 minutes of manual compression.  The neck wound bed was irrigated with antibiotic saline and hemostasis was observed.  The 2 heads of the sternocleidomastoid were carefully sutured back together with a 3-0 Vicryl in a running fashion.  The platysma muscle  was reapproximated using a 3-0 Vicryl in a running fashion.  The skin was then reapproximated using a 4-0 Monocryl in a subcuticular fashion. Sterile dressings were applied. The patient tolerated the procedure well. Sponge and needle counts were correct. The patient was then awakened and extubated in the operating room and taken to the recovery room in good condition.    ICA stenosis: 80%  Stenosis after stent placement: 0%  Lesion length: 31 mm  Lesion calcification: Mild  Lesion at level: C2  Neuro: Intact  ICA tortuosity: Mild  Arch type: Type 1  Flow reversal time: 5 minutes  Hollister: 0  Contrast amount: 12 mL  ACT: 293    Colton Kat DO  Date: 5/9/2024 Time: 10:07 CDT    CC:Tim Peguero MD

## 2024-05-10 VITALS
OXYGEN SATURATION: 96 % | RESPIRATION RATE: 18 BRPM | DIASTOLIC BLOOD PRESSURE: 89 MMHG | HEIGHT: 67 IN | SYSTOLIC BLOOD PRESSURE: 158 MMHG | BODY MASS INDEX: 29.62 KG/M2 | HEART RATE: 77 BPM | WEIGHT: 188.71 LBS | TEMPERATURE: 97.4 F

## 2024-05-10 PROCEDURE — 25010000002 CEFAZOLIN PER 500 MG: Performed by: SURGERY

## 2024-05-10 PROCEDURE — 99024 POSTOP FOLLOW-UP VISIT: CPT | Performed by: SURGERY

## 2024-05-10 RX ORDER — CLONIDINE HYDROCHLORIDE 0.1 MG/1
0.1 TABLET ORAL 2 TIMES DAILY PRN
Start: 2024-05-10

## 2024-05-10 RX ADMIN — Medication 1000 MCG: at 08:44

## 2024-05-10 RX ADMIN — ATORVASTATIN CALCIUM 80 MG: 40 TABLET ORAL at 08:44

## 2024-05-10 RX ADMIN — ASPIRIN 81 MG: 81 TABLET, CHEWABLE ORAL at 08:44

## 2024-05-10 RX ADMIN — LOSARTAN POTASSIUM 100 MG: 50 TABLET, FILM COATED ORAL at 08:44

## 2024-05-10 RX ADMIN — AMLODIPINE BESYLATE 5 MG: 5 TABLET ORAL at 08:44

## 2024-05-10 RX ADMIN — CARVEDILOL 6.25 MG: 6.25 TABLET, FILM COATED ORAL at 08:44

## 2024-05-10 RX ADMIN — CLOPIDOGREL BISULFATE 75 MG: 75 TABLET, FILM COATED ORAL at 08:44

## 2024-05-10 RX ADMIN — HYDROCHLOROTHIAZIDE 12.5 MG: 25 TABLET ORAL at 08:44

## 2024-05-10 RX ADMIN — CEFAZOLIN 2000 MG: 2 INJECTION, POWDER, FOR SOLUTION INTRAMUSCULAR; INTRAVENOUS at 00:32

## 2024-05-10 NOTE — PLAN OF CARE
Problem: Adult Inpatient Plan of Care  Goal: Plan of Care Review  Outcome: Ongoing, Progressing  Flowsheets (Taken 5/10/2024 3297)  Progress: improving  Plan of Care Reviewed With: patient  Outcome Evaluation: Pt denies pain or nausea. Ambulating with no difficulties. Voiding. Neuros intact. SCDs. Safety maintained.

## 2024-05-10 NOTE — PLAN OF CARE
Goal Outcome Evaluation:         Discharge orders in place, IV removed per protocol. Education completed, denies questions at this time. Waiting for ride home.

## 2024-05-10 NOTE — DISCHARGE SUMMARY
Date of Discharge:  5/10/2024    Discharge Diagnosis: Stenosis of left carotid artery [I65.22]    Presenting Problem/History of Present Illness  Stenosis of left carotid artery [I65.22]  Preop testing [Z01.818]  Carotid stenosis, left [I65.22]       Hospital Course  Patient is a 74 y.o. male presented for an elective left transcarotid artery revascularization.  He did undergo a left TCAR without incident.  The patient was transferred to  for continued care.  Overnight, the patient has done well. Daniele Delong  vitals have remained stable, left neck soft without hematoma, and without neurological deficit.  Groin soft without hematoma.  Daniele Delong  is stable and ready for discharge.  We will see him back in 2 weeks for post operative follow up.  His medications will stay the same.  Written and verbal instructions were given.  This all was discussed in full with complete understanding.      Procedures Performed  Procedure(s):  LEFT TRANSCAROTID ARTERY REVASCULARIZATION       Consults:   Consults       No orders found for last 30 day(s).              Condition on Discharge:  good    Discharge Medications     Discharge Medications        Continue These Medications        Instructions Start Date   amLODIPine 5 MG tablet  Commonly known as: NORVASC   5 mg, Oral, Daily      aspirin 81 MG chewable tablet   81 mg, Oral, Daily      atorvastatin 20 MG tablet  Commonly known as: LIPITOR   20 mg, Oral, Daily      carvedilol 6.25 MG tablet  Commonly known as: COREG   1 tablet, Oral, 2 times daily      cloNIDine 0.1 MG tablet  Commonly known as: CATAPRES   0.1 mg, Oral, 2 Times Daily PRN      clopidogrel 75 MG tablet  Commonly known as: Plavix   75 mg, Oral, Daily      losartan-hydrochlorothiazide 100-12.5 MG per tablet  Commonly known as: HYZAAR   1 tablet, Oral, Daily      vitamin B-12 1000 MCG tablet  Commonly known as: CYANOCOBALAMIN   1,000 mcg, Oral, Daily               Discharge Diet:   Diet Instructions        Advance Diet As Tolerated -Target Diet: Advance as tolerated      Target Diet: Advance as tolerated            Activity at Discharge:   Activity Instructions       Bathing Restrictions      Type of Restriction: Bathing    Bathing Restrictions: Other    Explain Bathing Restrictions: may shower tomorrow    Driving Restrictions      Type of Restriction: Driving    Driving Restrictions: No Driving (Time Limited)    Length: 1 Week    Lifting Restrictions      Type of Restriction: Lifting    Lifting Restrictions: Lifting Restriction (Indicate Limit)    Weight Limit (Pounds): 10    Length of Lifting Restriction: 1 week    Other Activity Restrictions      Type of Restriction: Other    Explain Other Restrictions: No bending, stooping, straining            Follow-up Appointments  Future Appointments   Date Time Provider Department Center   2/12/2025 10:00 AM Sy Mcginnis MD [facility]     Additional Instructions for the Follow-ups that You Need to Schedule       Discharge Follow-up with Specialty: Maria Luz YUAN; 2 Weeks   As directed      Specialty: Maria Luz YUAN   Follow Up: 2 Weeks   Follow Up Details: post op tcar                 I did spend more than 30 minutes reviewing the chart, face to face encounter, and organizing discharge.    Colton Kat DO  05/10/24  08:18 CDT

## 2024-05-10 NOTE — PAYOR COMM NOTE
"Daniele Nguyễn (74 y.o. Male)   [id number]    admit 5/9   [facility] phone      Fax            Date of Birth   1950    Social Security Number       Address   [address] [place] [id number]    Home Phone       MRN   [id number]       Alevism   Non-Shinto    Marital Status   Single                            Admission Date   5/9/24    Admission Type   Elective    Admitting Provider   Colton Kat DO    Attending Provider   Colton Kat DO    Department, Room/Bed   [facility] 3C, 364/1       Discharge Date       Discharge Disposition       Discharge Destination                                 Attending Provider: Colton Kat DO    Allergies: Lisinopril    Isolation: None   Infection: None   Code Status: CPR    Ht: 170 cm (66.93\")   Wt: 85.6 kg (188 lb 11.4 oz)    Admission Cmt: None   Principal Problem: Stenosis of left carotid artery [I65.22]                   Active Insurance as of 5/9/2024       Primary Coverage       Payor Plan Insurance Group Employer/Plan Group    AETNA MEDICARE REPLACEMENT AETNA MEDICARE REPLACEMENT [id number]       Payor Plan Address Payor Plan Phone Number Payor Plan Fax Number Effective Dates    [address] [phone]  1/1/2023 - None Entered    [facility] [id number]         Subscriber Name Subscriber Birth Date Member ID       DANIELE NGUYỄN 1950 [id number]                     Emergency Contacts        (Rel.) Home Phone Work Phone Mobile Phone    Zandra Adams (Daughter) [phone] -- [phone]              Vital Signs (last day)       Date/Time Temp Temp src Pulse Resp BP Patient Position SpO2    05/10/24 0734 97.4 (36.3) Oral 77 18 158/89 Lying 96    05/10/24 0329 97.6 (36.4) Oral 91 16 143/71 Lying 96    05/09/24 2345 98.2 (36.8) Oral 84 16 127/66 Lying 96    05/09/24 1929 98.1 (36.7) Oral 83 16 147/76 Lying 94    05/09/24 1402 97.4 (36.3) " Oral 76 16 153/82 Lying 97    05/09/24 1255 97.3 (36.3) Axillary 57 16 158/79 Lying 90    05/09/24 1220 97.9 (36.6) Axillary 63 18 151/88 Lying 93    05/09/24 1200 97.8 (36.6) Temporal 66 14 147/82 -- 95    05/09/24 1150 -- -- 66 14 153/79 -- 94    05/09/24 1140 -- -- 67 14 138/83 -- 96    05/09/24 1130 -- -- 64 14 150/82 -- 97    05/09/24 1110 -- -- 65 16 138/78 -- 95    05/09/24 1100 -- -- 66 16 140/78 -- 95    05/09/24 1050 -- -- 68 16 142/77 -- 96    05/09/24 1040 -- -- 70 18 -- -- 90    05/09/24 1035 -- -- 70 18 -- -- 93    05/09/24 1030 -- -- 70 18 -- -- 99    05/09/24 1025 -- -- 76 18 -- -- 99    05/09/24 1020 -- -- 75 16 -- -- 97    05/09/24 1015 -- -- 75 14 -- -- 97    05/09/24 1010 97.1 (36.2) Temporal 76 12 139/74 Lying 97    05/09/24 0841 -- -- -- -- 157/83 Lying --    05/09/24 0837 -- -- 58 16 146/78 Lying 95    05/09/24 0745 96.8 (36) Temporal 64 16 174/91 Sitting 96          Current Facility-Administered Medications   Medication Dose Route Frequency Provider Last Rate Last Admin    acetaminophen (TYLENOL) tablet 650 mg  650 mg Oral Q4H PRN BickingColton DO        amLODIPine (NORVASC) tablet 5 mg  5 mg Oral Daily BickingColton DO   5 mg at 05/10/24 0844    aspirin chewable tablet 81 mg  81 mg Oral Daily BickingColton DO   81 mg at 05/10/24 0844    atorvastatin (LIPITOR) tablet 80 mg  80 mg Oral Daily Bicking, Colton DC, DO   80 mg at 05/10/24 0844    carvedilol (COREG) tablet 6.25 mg  6.25 mg Oral BID With Meals Colton Kat DO   6.25 mg at 05/10/24 0844    cloNIDine (CATAPRES) tablet 0.1 mg  0.1 mg Oral BID PRN Colton Kat DO        clopidogrel (PLAVIX) tablet 75 mg  75 mg Oral Daily Colton Kat DO   75 mg at 05/10/24 0844    hydrALAZINE (APRESOLINE) injection 10 mg  10 mg Intravenous Q4H PRN Colton Kat DO        losartan (COZAAR) tablet 100 mg  100 mg Oral Q24H Colton Kat DO   100 mg at 05/10/24 0844    And    hydroCHLOROthiazide tablet 12.5  mg  12.5 mg Oral Q24H Colton Kat DO   12.5 mg at 05/10/24 0844    HYDROcodone-acetaminophen (NORCO) 5-325 MG per tablet 1 tablet  1 tablet Oral Q4H PRN Colton Kat DO        labetalol (NORMODYNE,TRANDATE) injection 10 mg  10 mg Intravenous Q2H PRN Colton Kat DO        morphine injection 4 mg  4 mg Intravenous Q2H PRN Colton Kat DO        And    naloxone (NARCAN) injection 0.4 mg  0.4 mg Intravenous Q5 Min PRN BicColton jennings DO        ondansetron ODT (ZOFRAN-ODT) disintegrating tablet 4 mg  4 mg Oral Q6H PRN Colton Kat DO        Or    ondansetron (ZOFRAN) injection 4 mg  4 mg Intravenous Q6H PRN Shey, Colton DC DO        pseudoephedrine (SUDAFED) tablet 60 mg  60 mg Oral Q4H PRN Colton Kat,         sodium chloride 0.9 % flush 10 mL  10 mL Intravenous Q12H Colton Kat,         sodium chloride 0.9 % flush 10 mL  10 mL Intravenous PRN Colton Kat,         sodium chloride 0.9 % infusion 40 mL  40 mL Intravenous PRN Colton Kat DO        vitamin B-12 (CYANOCOBALAMIN) tablet 1,000 mcg  1,000 mcg Oral Daily Colton Kat DO   1,000 mcg at 05/10/24 0844        Operative/Procedure Notes (last 48 hours)        Colton Kat DO at 05/09/24 0929          Daniele Delong  5/9/2024     PREOPERATIVE DIAGNOSIS: Stenosis of left carotid artery [I65.22]  Preop testing [Z01.818]     POSTOPERATIVE DIAGNOSIS: Post-Op Diagnosis Codes:     * Stenosis of left carotid artery [I65.22]     * Preop testing [Z01.818]     PROCEDURE PERFORMED:   1.  Left internal carotid artery TCAR (transcarotid artery revascularization) with the ENROUTE transcarotid neuro protection and stent system     SURGEON: Colton Kat DO      ANESTHESIA: General    PREPARATION: Routine.    STAFF: Circulator: Alesha Marques RN  Scrub Person: Holden Escalona; Promise Beltran, RN; Zandra Brunner  Vendor Representative: Jaron Zelaya  Vascular Radiology Technician: Atul  Caroline ALVAREZ    Estimated Blood Loss: minimal    SPECIMENS: None    COMPLICATIONS: None    INDICATIONS: Daniele Delong is a 74 y.o. male who does have a history of stroke with dizziness and blurred vision. He was a previous smoker but quit in 2019. He is followed by Dr. Mcginnis for an ascending aortic aneurysm, last seen 2/7/24. He has previous left carotid endarterectomy many years ago out of town. He is maintained on aspirin and Lipitor. He did have noninvasive testing performed which I did review in office. The indications, risks, and possible complications of the procedure were explained to the patient, who voiced understanding and wished to proceed with surgery.     PROCEDURE IN DETAIL: The patient was taken to the operating room and placed on the operating table in a supine position. After general anesthesia was obtained, the left neck and bilateral groins were prepped and draped in a sterile manner.   A 4 cm transverse incision was made between the sternal and clavicular heads of the sternocleidomastoid muscle below the omohyoid.  Following longitudinal division of the carotid sheath the jugular vein was partially dissected and retracted laterally.  Once 3 cm of common carotid artery were isolated, the vessel loop was placed around the proximal one third of the common carotid artery under direct visualization.  A 5-0 Prolene suture was preplaced in the anterior wall the common carotid artery, in a Z stitch configuration, close to the clavicle to facilitate hemostasis upon removal of the arterial sheath at completion of the procedure.  The contralateral left common femoral vein was accessed under ultrasound guidance, using standard Seldinger and micropuncture access technique.  The venous return sheath was advanced into the common femoral vein over the advantage Glidewire.  Blood was aspirated from the flow line followed by flushing of the venous sheath with heparinized saline.  The venous sheath was secured to the  patient's skin with a 2-0 silk suture to maintain optimal position in the vessel.  8000 units of intravenous heparin was given to obtain a therapeutic ACT greater than 250 seconds prior to arterial access.  A 4 Georgian micropuncture set was used, puncturing the artery with a 21-gauge needle through the preplaced Z stitch while holding gentle traction on the vessel loop to stabilize and centralize the common carotid artery within the incision.  Careful attention was paid to the change in common carotid shape when using the vessel loop to control or lift the artery.  The micropuncture wire was then advanced into the external carotid artery and the 21-gauge needle was removed.  The micropuncture sheath was advanced into the external carotid artery and the wire and dilator were removed.  Pulsatile backflow indicated correct positioning.  The J-wire was then inserted into the external carotid artery.  After micro puncture sheath removal, the transcarotid arterial sheath was advanced to the 2.5 cm marker and the J-wire and dilator were removed.    The arterial sheath position was assessed under fluoroscopy in 2 projections to ensure that the sheath tip was oriented coaxially in the common carotid artery.  The arterial sheath was sutured to the patient in 2 separate areas.  Blood was slowly aspirated followed by flushing with heparinized saline.  Next, the flow controller was connected to the transcarotid arterial sheath, prepared by passively allowing a column of arterial blood to fill the line and connected to the venous return sheath.  The common carotid inflow was occluded proximal to the arteriotomy with a profunda clamp to achieve active flow reversal.  To confirm flow reversal, a saline bolus was delivered in the venous flow line on both high and low flow settings of the flow controller.  Angiograms were performed with slow injections of a small amount of contrast filling just past the lesion to minimize antegrade  transmission of microbubbles.  Prior to lesion manipulation, heart rate and blood pressure were managed upwards to optimize flow reversal and procedural neuro protection.  The lesion was crossed with an 014 guidewire and predilatation was performed with a 5 x 25 mm Enflate balloon.  Next, primary stenting was performed with the 10-8 x 40 mm ENROUTE transcarotid tapered stent.  Imaging was performed in both in KANDI and MORRIS projections to ensure the stent was fully deployed.  Completion angiogram also showed the stent to be widely patent without any residual stenosis, dissection, or occlusion.  At TCAR case completion, antegrade flow was restored by releasing the clamp on the common carotid artery then closing the NPS stopcocks to the flow lines.  The transcarotid arterial sheath was removed and the preclosure suture was tied in place.  30 mg of protamine was given intravenously for reversal.  The venous return sheath from the right groin was removed and hemostasis was achieved with 10 to 15 minutes of manual compression.  The neck wound bed was irrigated with antibiotic saline and hemostasis was observed.  The 2 heads of the sternocleidomastoid were carefully sutured back together with a 3-0 Vicryl in a running fashion.  The platysma muscle was reapproximated using a 3-0 Vicryl in a running fashion.  The skin was then reapproximated using a 4-0 Monocryl in a subcuticular fashion. Sterile dressings were applied. The patient tolerated the procedure well. Sponge and needle counts were correct. The patient was then awakened and extubated in the operating room and taken to the recovery room in good condition.    ICA stenosis: 80%  Stenosis after stent placement: 0%  Lesion length: 31 mm  Lesion calcification: Mild  Lesion at level: C2  Neuro: Intact  ICA tortuosity: Mild  Arch type: Type 1  Flow reversal time: 5 minutes  Neil: 0  Contrast amount: 12 mL  ACT: 293    Colton Kat,   Date: 5/9/2024 Time: 10:07  CDT    CC:Tim Peguero MD     Electronically signed by Colton Kat DO at 05/09/24 0388

## 2024-05-11 ENCOUNTER — READMISSION MANAGEMENT (OUTPATIENT)
Dept: CALL CENTER | Facility: HOSPITAL | Age: 74
End: 2024-05-11
Payer: MEDICARE

## 2024-05-13 NOTE — PAYOR COMM NOTE
"REF:    [id number]     [facility]  FAX  [phone]     BaljeetDaniele ray (74 y.o. Male)       Date of Birth   1950    Social Security Number       Address   [address] [place] [id number]    Home Phone       MRN   [id number]       Worship   Non-Jew    Marital Status   Single                            Admission Date   5/9/24    Admission Type   Elective    Admitting Provider   Colton Kat DO    Attending Provider       Department, Room/Bed   [facility] 3C, 364/1       Discharge Date   5/10/2024    Discharge Disposition   Home or Self Care    Discharge Destination                                 Attending Provider: (none)   Allergies: Lisinopril    Isolation: None   Infection: None   Code Status: Prior    Ht: 170 cm (66.93\")   Wt: 85.6 kg (188 lb 11.4 oz)    Admission Cmt: None   Principal Problem: Stenosis of left carotid artery [I65.22]                   Active Insurance as of 5/9/2024       Primary Coverage       Payor Plan Insurance Group Employer/Plan Group    AETNA MEDICARE REPLACEMENT AETNA MEDICARE REPLACEMENT [id number]       Payor Plan Address Payor Plan Phone Number Payor Plan Fax Number Effective Dates    [address] [phone]  1/1/2023 - None Entered    [facility] [id number]         Subscriber Name Subscriber Birth Date Member ID       DANIELE NGUYỄN 1950 [id number]                     Emergency Contacts        (Rel.) Home Phone Work Phone Mobile Phone    Zandra Adams (Daughter) [phone] -- [phone]                 Discharge Summary        Colton Kat DO at 05/10/24 0818            Date of Discharge:  5/10/2024    Discharge Diagnosis: Stenosis of left carotid artery [I65.22]    Presenting Problem/History of Present Illness  Stenosis of left carotid artery [I65.22]  Preop testing [Z01.818]  Carotid stenosis, left [I65.22]       Hospital Course  Patient is a 74 y.o. male presented for an " elective left transcarotid artery revascularization.  He did undergo a left TCAR without incident.  The patient was transferred to  for continued care.  Overnight, the patient has done well. Daniele Delong  vitals have remained stable, left neck soft without hematoma, and without neurological deficit.  Groin soft without hematoma.  Daniele Delong  is stable and ready for discharge.  We will see him back in 2 weeks for post operative follow up.  His medications will stay the same.  Written and verbal instructions were given.  This all was discussed in full with complete understanding.      Procedures Performed  Procedure(s):  LEFT TRANSCAROTID ARTERY REVASCULARIZATION       Consults:   Consults       No orders found for last 30 day(s).              Condition on Discharge:  good    Discharge Medications     Discharge Medications        Continue These Medications        Instructions Start Date   amLODIPine 5 MG tablet  Commonly known as: NORVASC   5 mg, Oral, Daily      aspirin 81 MG chewable tablet   81 mg, Oral, Daily      atorvastatin 20 MG tablet  Commonly known as: LIPITOR   20 mg, Oral, Daily      carvedilol 6.25 MG tablet  Commonly known as: COREG   1 tablet, Oral, 2 times daily      cloNIDine 0.1 MG tablet  Commonly known as: CATAPRES   0.1 mg, Oral, 2 Times Daily PRN      clopidogrel 75 MG tablet  Commonly known as: Plavix   75 mg, Oral, Daily      losartan-hydrochlorothiazide 100-12.5 MG per tablet  Commonly known as: HYZAAR   1 tablet, Oral, Daily      vitamin B-12 1000 MCG tablet  Commonly known as: CYANOCOBALAMIN   1,000 mcg, Oral, Daily               Discharge Diet:   Diet Instructions       Advance Diet As Tolerated -Target Diet: Advance as tolerated      Target Diet: Advance as tolerated            Activity at Discharge:   Activity Instructions       Bathing Restrictions      Type of Restriction: Bathing    Bathing Restrictions: Other    Explain Bathing Restrictions: may shower tomorrow    Driving  Restrictions      Type of Restriction: Driving    Driving Restrictions: No Driving (Time Limited)    Length: 1 Week    Lifting Restrictions      Type of Restriction: Lifting    Lifting Restrictions: Lifting Restriction (Indicate Limit)    Weight Limit (Pounds): 10    Length of Lifting Restriction: 1 week    Other Activity Restrictions      Type of Restriction: Other    Explain Other Restrictions: No bending, stooping, straining            Follow-up Appointments  Future Appointments   Date Time Provider Department Center   2/12/2025 10:00 AM Sy Mcginnis MD [facility]  [facility]     Additional Instructions for the Follow-ups that You Need to Schedule       Discharge Follow-up with Specialty: Maria Luz YUAN; 2 Weeks   As directed      Specialty: Maria Luz YUAN   Follow Up: 2 Weeks   Follow Up Details: post op tcar                 I did spend more than 30 minutes reviewing the chart, face to face encounter, and organizing discharge.    Reina Butler DO  05/10/24  08:18 CDT        Electronically signed by Reina Butler DO at 05/10/24 0819       Discharge Order (From admission, onward)       Start     Ordered    05/10/24 0930  Discharge patient  Once        Expected Discharge Date: 05/10/24   Expected Discharge Time: Morning   Discharge Disposition: Home or Self Care   Physician of Record for Attribution - Please select from Treatment Team: REINA BUTLER [[id number]]   Review needed by CMO to determine Physician of Record: No      Question Answer Comment   Physician of Record for Attribution - Please select from Treatment Team REINA BUTLER    Review needed by CMO to determine Physician of Record No        05/10/24 0960

## 2024-05-16 ENCOUNTER — READMISSION MANAGEMENT (OUTPATIENT)
Dept: CALL CENTER | Facility: HOSPITAL | Age: 74
End: 2024-05-16
Payer: MEDICARE

## 2024-05-16 NOTE — OUTREACH NOTE
General Surgery Week 1 Survey      Flowsheet Row Responses   [facility] facility patient discharged from? [place]   Does the patient have one of the following disease processes/diagnoses(primary or secondary)? General Surgery   Week 1 attempt successful? No   Unsuccessful attempts Attempt 1            Yecenia Tsai Registered Nurse

## 2024-05-21 ENCOUNTER — TELEPHONE (OUTPATIENT)
Dept: VASCULAR SURGERY | Facility: CLINIC | Age: 74
End: 2024-05-21
Payer: MEDICARE

## 2024-05-21 ENCOUNTER — READMISSION MANAGEMENT (OUTPATIENT)
Dept: CALL CENTER | Facility: HOSPITAL | Age: 74
End: 2024-05-21
Payer: MEDICARE

## 2024-05-21 NOTE — TELEPHONE ENCOUNTER
Called patient to confirm appointment on 05/22/24 at 10:00 am. Patient confirmed appointment date and time.

## 2024-05-21 NOTE — OUTREACH NOTE
General Surgery Week 1 Survey      Flowsheet Row Responses   [facility] facility patient discharged from? [place]   Does the patient have one of the following disease processes/diagnoses(primary or secondary)? General Surgery   Week 1 attempt successful? No   Unsuccessful attempts Attempt 2            Ana Cristina HUTTON - Licensed Nurse

## 2024-05-22 ENCOUNTER — OFFICE VISIT (OUTPATIENT)
Dept: VASCULAR SURGERY | Facility: CLINIC | Age: 74
End: 2024-05-22
Payer: MEDICARE

## 2024-05-22 VITALS
SYSTOLIC BLOOD PRESSURE: 128 MMHG | OXYGEN SATURATION: 98 % | WEIGHT: 188 LBS | HEART RATE: 64 BPM | HEIGHT: 66 IN | DIASTOLIC BLOOD PRESSURE: 78 MMHG | BODY MASS INDEX: 30.22 KG/M2

## 2024-05-22 DIAGNOSIS — I65.23 BILATERAL CAROTID ARTERY STENOSIS: Primary | ICD-10-CM

## 2024-05-22 DIAGNOSIS — I10 ESSENTIAL HYPERTENSION: ICD-10-CM

## 2024-05-22 DIAGNOSIS — E78.49 OTHER HYPERLIPIDEMIA: ICD-10-CM

## 2024-05-22 DIAGNOSIS — I71.21 ANEURYSM OF ASCENDING AORTA WITHOUT RUPTURE: ICD-10-CM

## 2024-05-22 PROCEDURE — 3078F DIAST BP <80 MM HG: CPT | Performed by: NURSE PRACTITIONER

## 2024-05-22 PROCEDURE — 3074F SYST BP LT 130 MM HG: CPT | Performed by: NURSE PRACTITIONER

## 2024-05-22 PROCEDURE — 1159F MED LIST DOCD IN RCRD: CPT | Performed by: NURSE PRACTITIONER

## 2024-05-22 PROCEDURE — 1160F RVW MEDS BY RX/DR IN RCRD: CPT | Performed by: NURSE PRACTITIONER

## 2024-05-22 PROCEDURE — 99024 POSTOP FOLLOW-UP VISIT: CPT | Performed by: NURSE PRACTITIONER

## 2024-05-22 NOTE — LETTER
May 22, 2024     Tim Peguero MD  [address] [place], [place] [id number]    Patient: Daniele Delong   YOB: 1950   Date of Visit: 5/22/2024     Dear Tim Peguero MD:       Thank you for referring Daniele Delong to me for evaluation. Below are the relevant portions of my assessment and plan of care.    If you have questions, please do not hesitate to call me. I look forward to following Daniele along with you.         Sincerely,        KWABENA Larios        CC: No Recipients    Maria Luz Chatman APRN  05/22/24 1037  Sign when Signing Visit  5/22/2024       Tim Peguero MD  [address] [place] [address]    Daniele Delong  1950    Chief Complaint   Patient presents with   • Post-op     2 week post op. Left TCAR 5/9/24. Patient denies any post operative issues.        Dear Tim Peguero MD   HPI  I had the pleasure of seeing your patient Daniele Delong in the office today.    As you recall, Daniele Delong is a 74 y.o.  male who you are currently following for routine health maintenance.  He does have a history of stroke with dizziness and blurred vision.  He was a previous smoker but quit in 2019.  He is followed by Dr. Mcginnis for an ascending aortic aneurysm, last seen 2/7/24.  He has previously undergone a left carotid endarterectomy many years ago and was found to have significant restenosis.  He did undergo a left TCAR on 5/9/2024.  His left neck incision has healed as well as his right groin.  He is maintained on aspirin, Plavix, and Lipitor.       Review of Systems   Constitutional: Negative.    HENT: Negative.     Eyes: Negative.    Respiratory: Negative.     Cardiovascular: Negative.    Gastrointestinal: Negative.    Endocrine: Negative.    Genitourinary: Negative.    Musculoskeletal: Negative.    Skin: Negative.    Allergic/Immunologic: Negative.    Neurological:  Positive for dizziness and numbness (To his feet at times).  "  Hematological: Negative.    Psychiatric/Behavioral: Negative.     All other systems reviewed and are negative.       /78   Pulse 64   Ht 167.6 cm (66\")   Wt 85.3 kg (188 lb)   SpO2 98%   BMI 30.34 kg/m²    Physical Exam  Vitals and nursing note reviewed.   Constitutional:       Appearance: Normal appearance. He is well-developed. He is obese.   HENT:      Head: Normocephalic and atraumatic.   Eyes:      General: No scleral icterus.     Pupils: Pupils are equal, round, and reactive to light.   Neck:      Thyroid: No thyromegaly.      Comments: Left neck incision healed  Cardiovascular:      Rate and Rhythm: Normal rate and regular rhythm.      Heart sounds: Normal heart sounds.      Comments: Right groin healed  Pulmonary:      Effort: Pulmonary effort is normal.      Breath sounds: Normal breath sounds.   Abdominal:      General: Bowel sounds are normal.      Palpations: Abdomen is soft.   Musculoskeletal:         General: Normal range of motion.      Cervical back: Normal range of motion and neck supple.   Skin:     General: Skin is warm and dry.   Neurological:      Mental Status: He is alert and oriented to person, place, and time.   Psychiatric:         Mood and Affect: Mood normal.         Behavior: Behavior normal.         Thought Content: Thought content normal.         Judgment: Judgment normal.            Patient Active Problem List   Diagnosis   • Paresthesias   • Essential hypertension   • Oropharyngeal dysphagia   • Other hyperlipidemia   • Thoracic aortic aneurysm without rupture   • Carotid stenosis, left   • Stenosis of left carotid artery   • Preop testing        Diagnosis Plan   1. Bilateral carotid artery stenosis  US Carotid Bilateral      2. Essential hypertension        3. Other hyperlipidemia        4. Aneurysm of ascending aorta without rupture                Plan: After thoroughly evaluating Daniele Delong, I am pleased to report he is doing well status post left TCAR.  His left " neck incision has healed as well as his right groin.  He is free to resume normal activity without restriction.  We will see him back in 6 months with repeat noninvasive testing for continued surveillance, including a carotid duplex.  I did discuss vascular risk factors as it pertains to the progression of vascular disease including controlling his hypertension and hyperlipidemia.  His blood pressure stable on his current medications.  He should continue his aspirin 81 mg daily, Plavix 75 mg daily, and Lipitor 20 mg daily in addition to his other medications.  This was all discussed in full with complete understanding.  Thank you for allowing me to participate in the care of your patient.  Please do not hesitate to call with any questions or concerns.  We will keep you aware of any further encounters with Daniele Delong.        Sincerely yours,         KWABENA Larios Jonathan E, MD

## 2024-05-22 NOTE — PROGRESS NOTES
"5/22/2024       Tim Peguero MD  [address] [place], [place] [id number]    Daniele Delong  1950    Chief Complaint   Patient presents with    Post-op     2 week post op. Left TCAR 5/9/24. Patient denies any post operative issues.        Dear Tim Peguero MD   HPI  I had the pleasure of seeing your patient Daniele Delong in the office today.    As you recall, Daniele Delong is a 74 y.o.  male who you are currently following for routine health maintenance.  He does have a history of stroke with dizziness and blurred vision.  He was a previous smoker but quit in 2019.  He is followed by Dr. Mcginnis for an ascending aortic aneurysm, last seen 2/7/24.  He has previously undergone a left carotid endarterectomy many years ago and was found to have significant restenosis.  He did undergo a left TCAR on 5/9/2024.  His left neck incision has healed as well as his right groin.  He is maintained on aspirin, Plavix, and Lipitor.       Review of Systems   Constitutional: Negative.    HENT: Negative.     Eyes: Negative.    Respiratory: Negative.     Cardiovascular: Negative.    Gastrointestinal: Negative.    Endocrine: Negative.    Genitourinary: Negative.    Musculoskeletal: Negative.    Skin: Negative.    Allergic/Immunologic: Negative.    Neurological:  Positive for dizziness and numbness (To his feet at times).   Hematological: Negative.    Psychiatric/Behavioral: Negative.     All other systems reviewed and are negative.       /78   Pulse 64   Ht 167.6 cm (66\")   Wt 85.3 kg (188 lb)   SpO2 98%   BMI 30.34 kg/m²    Physical Exam  Vitals and nursing note reviewed.   Constitutional:       Appearance: Normal appearance. He is well-developed. He is obese.   HENT:      Head: Normocephalic and atraumatic.   Eyes:      General: No scleral icterus.     Pupils: Pupils are equal, round, and reactive to light.   Neck:      Thyroid: No thyromegaly.      Comments: Left neck incision " healed  Cardiovascular:      Rate and Rhythm: Normal rate and regular rhythm.      Heart sounds: Normal heart sounds.      Comments: Right groin healed  Pulmonary:      Effort: Pulmonary effort is normal.      Breath sounds: Normal breath sounds.   Abdominal:      General: Bowel sounds are normal.      Palpations: Abdomen is soft.   Musculoskeletal:         General: Normal range of motion.      Cervical back: Normal range of motion and neck supple.   Skin:     General: Skin is warm and dry.   Neurological:      Mental Status: He is alert and oriented to person, place, and time.   Psychiatric:         Mood and Affect: Mood normal.         Behavior: Behavior normal.         Thought Content: Thought content normal.         Judgment: Judgment normal.            Patient Active Problem List   Diagnosis    Paresthesias    Essential hypertension    Oropharyngeal dysphagia    Other hyperlipidemia    Thoracic aortic aneurysm without rupture    Carotid stenosis, left    Stenosis of left carotid artery    Preop testing        Diagnosis Plan   1. Bilateral carotid artery stenosis  US Carotid Bilateral      2. Essential hypertension        3. Other hyperlipidemia        4. Aneurysm of ascending aorta without rupture                Plan: After thoroughly evaluating Daniele Delong, I am pleased to report he is doing well status post left TCAR.  His left neck incision has healed as well as his right groin.  He is free to resume normal activity without restriction.  We will see him back in 6 months with repeat noninvasive testing for continued surveillance, including a carotid duplex.  I did discuss vascular risk factors as it pertains to the progression of vascular disease including controlling his hypertension and hyperlipidemia.  His blood pressure stable on his current medications.  He should continue his aspirin 81 mg daily, Plavix 75 mg daily, and Lipitor 20 mg daily in addition to his other medications.  This was all discussed  in full with complete understanding.  Thank you for allowing me to participate in the care of your patient.  Please do not hesitate to call with any questions or concerns.  We will keep you aware of any further encounters with Daniele Delong.        Sincerely yours,         KWABENA Larios Jonathan E, MD

## 2024-05-29 ENCOUNTER — READMISSION MANAGEMENT (OUTPATIENT)
Dept: CALL CENTER | Facility: HOSPITAL | Age: 74
End: 2024-05-29
Payer: MEDICARE

## 2024-05-29 NOTE — OUTREACH NOTE
General Surgery Week 3 Survey      Flowsheet Row Responses   [facility] facility patient discharged from? [place]   Does the patient have one of the following disease processes/diagnoses(primary or secondary)? General Surgery   Week 3 attempt successful? No   Unsuccessful attempts Attempt 1            Elaine COLBY - Registered Nurse

## 2024-06-04 ENCOUNTER — READMISSION MANAGEMENT (OUTPATIENT)
Dept: CALL CENTER | Facility: HOSPITAL | Age: 74
End: 2024-06-04
Payer: MEDICARE

## 2024-06-04 NOTE — OUTREACH NOTE
General Surgery Week 3 Survey      Flowsheet Row Responses   [facility] facility patient discharged from? [place]   Does the patient have one of the following disease processes/diagnoses(primary or secondary)? General Surgery   Week 3 attempt successful? No   Unsuccessful attempts Attempt 2            Manuel JONAS - Registered Nurse

## 2024-08-07 NOTE — TELEPHONE ENCOUNTER
Patient is here for follow-up of his anemia and peptic ulcer disease.  Last HB 12.7 on 3/28/24    He had a history of gastric ulcers with 2 moderate-sized ulcers with necrotic bases on 12/21/2023 EGD.  His hemoglobin was 6.7 in December 2023.  He was on Plavix but is now off of it.  He is on a baby aspirin.  He has started on Prilosec 20 mg twice daily and Pepcid nightly.  He is now going to see cardiology and wants to know if he should restart his Plavix.  He was not on any stomach medicines prior to his bleed in December 23.    At this time we will see him on appearing basis.  We will recommend that he stay on the Prilosec twice a day and Pepcid nightly.  He is going to talk to his cardiologist about restarting the anticoagulation or Plavix. We will see prn      Total time with pt was 15 min       Spoke to Mr. Delong and went over the results of his HST performed May 13th.  Questions answered.  Dr. Pieter Lugo wrote an order for Mr. Delong to have in-lab CPAP trial due to his hypoxemia. Mr. Delong was told the sleep center is scheduling into the middle of August.  Someone from the sleep center will call to schedule.

## 2024-10-02 ENCOUNTER — TELEPHONE (OUTPATIENT)
Dept: CARDIOLOGY | Facility: CLINIC | Age: 74
End: 2024-10-02
Payer: MEDICARE

## 2024-10-02 NOTE — TELEPHONE ENCOUNTER
[facility] IS REQUESTING CLEARANCE FOR  EXCISION      WITH DR CERNA -[phone]/ PHONE [phone]     PT HAS  CAD  ?   PT IS ON  ASA 81 MG, PLAVIX 75 MG     LOV WITH YOU WAS  04/18/24  ?   PLEASE ADVISE

## 2024-10-03 ENCOUNTER — TELEPHONE (OUTPATIENT)
Dept: VASCULAR SURGERY | Facility: CLINIC | Age: 74
End: 2024-10-03

## 2024-10-04 ENCOUNTER — TELEPHONE (OUTPATIENT)
Dept: VASCULAR SURGERY | Facility: CLINIC | Age: 74
End: 2024-10-04
Payer: MEDICARE

## 2024-10-04 NOTE — TELEPHONE ENCOUNTER
Call received from [facility] requesting to hold Plavix for a Lymphnode Biopsy, per Maria Luz YUAN they may hold Plavix as needed but must taken an 81MG aspirin daily.

## 2024-11-19 ENCOUNTER — TELEPHONE (OUTPATIENT)
Dept: VASCULAR SURGERY | Facility: CLINIC | Age: 74
End: 2024-11-19

## 2024-11-19 NOTE — TELEPHONE ENCOUNTER
Caller: Daniele Delong    Relationship: Self    Best call back number: [phone]    What is the best time to reach you: ANY    Who are you requesting to speak with (clinical staff, provider,  specific staff member): CLINICAL    Do you know the name of the person who called: PT    What was the call regarding: PT STATES THAT HE HAD A US OF THE CAROTID ABOUT A MONTH AGO WHEN HE WENT TO THE HOSPITAL. THEY TOLD HIM THAT EVERYTHING LOOKED GOOD. PT WANTS TO KNOW IF HE CAN GO GET THAT IMAGING AND BRING THAT TO THE APPT OR IF HE NEEDS TO HAVE THIS TESTING AGAIN. PLEASE GIVE HIM A CALL AND LET HIM KNOW SO THAT HE CAN GO PICK IT UP IF THAT PRIOR IMAGING WILL DO. THANK YOU    Is it okay if the provider responds through [employer]: NO

## 2024-11-21 ENCOUNTER — OFFICE VISIT (OUTPATIENT)
Dept: VASCULAR SURGERY | Facility: CLINIC | Age: 74
End: 2024-11-21
Payer: MEDICARE

## 2024-11-21 ENCOUNTER — HOSPITAL ENCOUNTER (OUTPATIENT)
Dept: ULTRASOUND IMAGING | Facility: HOSPITAL | Age: 74
Discharge: HOME | End: 2024-11-21
Admitting: NURSE PRACTITIONER
Payer: MEDICARE

## 2024-11-21 VITALS
BODY MASS INDEX: 28.96 KG/M2 | OXYGEN SATURATION: 95 % | DIASTOLIC BLOOD PRESSURE: 76 MMHG | HEIGHT: 66 IN | HEART RATE: 72 BPM | SYSTOLIC BLOOD PRESSURE: 128 MMHG | WEIGHT: 180.2 LBS

## 2024-11-21 DIAGNOSIS — I10 ESSENTIAL HYPERTENSION: ICD-10-CM

## 2024-11-21 DIAGNOSIS — E78.49 OTHER HYPERLIPIDEMIA: ICD-10-CM

## 2024-11-21 DIAGNOSIS — I65.23 BILATERAL CAROTID ARTERY STENOSIS: ICD-10-CM

## 2024-11-21 DIAGNOSIS — I65.23 BILATERAL CAROTID ARTERY STENOSIS: Primary | ICD-10-CM

## 2024-11-21 PROCEDURE — 93880 EXTRACRANIAL BILAT STUDY: CPT

## 2024-11-21 PROCEDURE — 1160F RVW MEDS BY RX/DR IN RCRD: CPT | Performed by: NURSE PRACTITIONER

## 2024-11-21 PROCEDURE — 1159F MED LIST DOCD IN RCRD: CPT | Performed by: NURSE PRACTITIONER

## 2024-11-21 PROCEDURE — 3078F DIAST BP <80 MM HG: CPT | Performed by: NURSE PRACTITIONER

## 2024-11-21 PROCEDURE — 99214 OFFICE O/P EST MOD 30 MIN: CPT | Performed by: NURSE PRACTITIONER

## 2024-11-21 PROCEDURE — 3074F SYST BP LT 130 MM HG: CPT | Performed by: NURSE PRACTITIONER

## 2024-11-21 NOTE — PROGRESS NOTES
"11/21/2024       Terri Rodriguez APRN  [address] [place] [address]    Daniele Delong  1950    Chief Complaint   Patient presents with    ilateral carotid artery stenosis     Testing done, no complaints       Dear Terri Rodriguez APRN   HPI  I had the pleasure of seeing your patient Daniele Delong in the office today.    As you recall, Daniele Delong is a 74 y.o.  male who you are currently following for routine health maintenance.  He does have a history of stroke with dizziness and blurred vision.  He was a previous smoker but quit in 2019.  He is followed by Dr. Mcginnis for an ascending aortic aneurysm, last seen 2/7/24.  He has previously undergone a left carotid endarterectomy many years ago and was found to have significant restenosis.  He did undergo a left TCAR on 5/9/2024.  Currently he is doing well and denies any strokelike symptoms.  He is maintained on aspirin, Plavix, and Lipitor.  He did have noninvasive testing performed today, which I did review in office.    Review of Systems   Constitutional: Negative.    HENT: Negative.     Eyes: Negative.    Respiratory: Negative.     Cardiovascular: Negative.    Gastrointestinal: Negative.    Endocrine: Negative.    Genitourinary: Negative.    Musculoskeletal: Negative.    Skin: Negative.    Allergic/Immunologic: Negative.    Neurological:  Positive for numbness (To his feet at times).   Hematological: Negative.    Psychiatric/Behavioral: Negative.     All other systems reviewed and are negative.       /76   Pulse 72   Ht 167.6 cm (66\")   Wt 81.7 kg (180 lb 3.2 oz)   SpO2 95%   BMI 29.09 kg/m²    Physical Exam  Vitals and nursing note reviewed.   Constitutional:       Appearance: Normal appearance. He is well-developed and overweight.   HENT:      Head: Normocephalic and atraumatic.   Eyes:      General: No scleral icterus.     Pupils: Pupils are equal, round, and reactive to light.   Neck:      Thyroid: No thyromegaly.   Cardiovascular:    "   Rate and Rhythm: Normal rate and regular rhythm.      Heart sounds: Normal heart sounds.   Pulmonary:      Effort: Pulmonary effort is normal.      Breath sounds: Normal breath sounds.   Abdominal:      General: Bowel sounds are normal.      Palpations: Abdomen is soft.   Musculoskeletal:         General: Normal range of motion.      Cervical back: Normal range of motion and neck supple.   Skin:     General: Skin is warm and dry.   Neurological:      Mental Status: He is alert and oriented to person, place, and time.   Psychiatric:         Behavior: Behavior normal. Behavior is cooperative.         Thought Content: Thought content normal.         Judgment: Judgment normal.       Diagnostic data:   History: Carotid occlusive disease     IMPRESSION:  Impression:  1. There is less than 50% stenosis of the right internal carotid artery.  2. There is less than 50% stenosis of the left internal carotid artery  stent.  3. Antegrade flow is demonstrated in bilateral vertebral arteries.     Comments: Bilateral carotid vertebral arterial duplex scan was  performed.     Grayscale imaging shows intimal thickening and calcified elements at the  carotid bifurcation. The right internal carotid artery peak systolic  velocity is 72.9 cm/sec. The end-diastolic velocity is 20.8 cm/sec. The  right ICA/CCA ratio is approximately 1.3. These findings correlate with  less than 50% stenosis of the right internal carotid artery.     Grayscale imaging shows the left internal carotid artery stent to be  widely patent. The left internal carotid artery peak systolic velocity  is 67.1 cm/sec. The end-diastolic velocity is 19.9 cm/sec. The left  ICA/CCA ratio is approximately 0.8. These findings correlate with less  than 50% stenosis of the left internal carotid artery.     Antegrade flow is demonstrated in bilateral vertebral arteries.  There is greater than 50% stenosis in the left external carotid artery.     This report was signed and  finalized on 11/21/2024 2:21 PM by Dr. Colton Kat MD.      Patient Active Problem List   Diagnosis    Paresthesias    Essential hypertension    Oropharyngeal dysphagia    Other hyperlipidemia    Thoracic aortic aneurysm without rupture    Carotid stenosis, left    Stenosis of left carotid artery    Preop testing        Diagnosis Plan   1. Bilateral carotid artery stenosis  US Carotid Bilateral      2. Essential hypertension        3. Other hyperlipidemia        4. BMI 29.0-29.9,adult                  Plan: After thoroughly evaluating Daniele Delong, I believe the best course of action is to remain conservative from vascular surgery standpoint.  Currently he is doing well and denies any strokelike symptoms.  I did review his testing which shows less than 50% carotid stenosis bilaterally.  We will see him back in 6 months with repeat noninvasive testing for continued surveillance, including a carotid duplex.  I did discuss vascular risk factors as they pertain to the progression of vascular disease including controlling his hypertension and hyperlipidemia.  His blood pressure stable on his current medications.  He should continue his aspirin 81 mg daily, Plavix 75 mg daily and Lipitor 20 mg daily in addition to his other medications.  Body mass index is 29.09 kg/m². BMI is >= 25 and <30. (Overweight) The following options were offered after discussion;: nutrition counseling/recommendations Am pleased to report he is doing well status post left TCAR.  His left neck incision has healed as well as his right groin.  He is free to resume normal activity without restriction.  We will see him back in 6 months with repeat noninvasive testing for continued surveillance, including a carotid duplex.  I did discuss vascular risk factors as it pertains to the progression of vascular disease including controlling his hypertension and hyperlipidemia.  His blood pressure stable on his current medications.  He should continue  his aspirin 81 mg daily, Plavix 75 mg daily, and Lipitor 20 mg daily in addition to his other medications.  This was all discussed in full with complete understanding.  Thank you for allowing me to participate in the care of your patient.  Please do not hesitate to call with any questions or concerns.  We will keep you aware of any further encounters with Daniele Delong.        Sincerely yours,         KWABENA Larios Lindsey, APRN

## 2024-11-21 NOTE — LETTER
November 21, 2024     KWABENA Gunderson  [address] [place] [address]    Patient: Daniele Deolng   YOB: 1950   Date of Visit: 11/21/2024     Dear KWABENA Gunderson:       Thank you for referring Daniele Delong to me for evaluation. Below are the relevant portions of my assessment and plan of care.    If you have questions, please do not hesitate to call me. I look forward to following Daniele along with you.         Sincerely,        KWABENA Larios        CC: No Recipients    Maria Luz Chatman APRN  11/21/24 1728  Sign when Signing Visit  11/21/2024       Terri Rodriguez APRN  [address] [place] [address]    Daniele Delong  1950    Chief Complaint   Patient presents with   • ilateral carotid artery stenosis     Testing done, no complaints       Dear Terri Rodriguez APRN   HPI  I had the pleasure of seeing your patient Daniele Delong in the office today.    As you recall, Daniele Delong is a 74 y.o.  male who you are currently following for routine health maintenance.  He does have a history of stroke with dizziness and blurred vision.  He was a previous smoker but quit in 2019.  He is followed by Dr. Mcginnis for an ascending aortic aneurysm, last seen 2/7/24.  He has previously undergone a left carotid endarterectomy many years ago and was found to have significant restenosis.  He did undergo a left TCAR on 5/9/2024.  Currently he is doing well and denies any strokelike symptoms.  He is maintained on aspirin, Plavix, and Lipitor.  He did have noninvasive testing performed today, which I did review in office.    Review of Systems   Constitutional: Negative.    HENT: Negative.     Eyes: Negative.    Respiratory: Negative.     Cardiovascular: Negative.    Gastrointestinal: Negative.    Endocrine: Negative.    Genitourinary: Negative.    Musculoskeletal: Negative.    Skin: Negative.    Allergic/Immunologic: Negative.    Neurological:  Positive for numbness (To his feet at  "times).   Hematological: Negative.    Psychiatric/Behavioral: Negative.     All other systems reviewed and are negative.       /76   Pulse 72   Ht 167.6 cm (66\")   Wt 81.7 kg (180 lb 3.2 oz)   SpO2 95%   BMI 29.09 kg/m²    Physical Exam  Vitals and nursing note reviewed.   Constitutional:       Appearance: Normal appearance. He is well-developed and overweight.   HENT:      Head: Normocephalic and atraumatic.   Eyes:      General: No scleral icterus.     Pupils: Pupils are equal, round, and reactive to light.   Neck:      Thyroid: No thyromegaly.   Cardiovascular:      Rate and Rhythm: Normal rate and regular rhythm.      Heart sounds: Normal heart sounds.   Pulmonary:      Effort: Pulmonary effort is normal.      Breath sounds: Normal breath sounds.   Abdominal:      General: Bowel sounds are normal.      Palpations: Abdomen is soft.   Musculoskeletal:         General: Normal range of motion.      Cervical back: Normal range of motion and neck supple.   Skin:     General: Skin is warm and dry.   Neurological:      Mental Status: He is alert and oriented to person, place, and time.   Psychiatric:         Behavior: Behavior normal. Behavior is cooperative.         Thought Content: Thought content normal.         Judgment: Judgment normal.       Diagnostic data:   History: Carotid occlusive disease     IMPRESSION:  Impression:  1. There is less than 50% stenosis of the right internal carotid artery.  2. There is less than 50% stenosis of the left internal carotid artery  stent.  3. Antegrade flow is demonstrated in bilateral vertebral arteries.     Comments: Bilateral carotid vertebral arterial duplex scan was  performed.     Grayscale imaging shows intimal thickening and calcified elements at the  carotid bifurcation. The right internal carotid artery peak systolic  velocity is 72.9 cm/sec. The end-diastolic velocity is 20.8 cm/sec. The  right ICA/CCA ratio is approximately 1.3. These findings correlate " with  less than 50% stenosis of the right internal carotid artery.     Grayscale imaging shows the left internal carotid artery stent to be  widely patent. The left internal carotid artery peak systolic velocity  is 67.1 cm/sec. The end-diastolic velocity is 19.9 cm/sec. The left  ICA/CCA ratio is approximately 0.8. These findings correlate with less  than 50% stenosis of the left internal carotid artery.     Antegrade flow is demonstrated in bilateral vertebral arteries.  There is greater than 50% stenosis in the left external carotid artery.     This report was signed and finalized on 11/21/2024 2:21 PM by Dr. Colton Kat MD.      Patient Active Problem List   Diagnosis   • Paresthesias   • Essential hypertension   • Oropharyngeal dysphagia   • Other hyperlipidemia   • Thoracic aortic aneurysm without rupture   • Carotid stenosis, left   • Stenosis of left carotid artery   • Preop testing        Diagnosis Plan   1. Bilateral carotid artery stenosis  US Carotid Bilateral      2. Essential hypertension        3. Other hyperlipidemia        4. BMI 29.0-29.9,adult                  Plan: After thoroughly evaluating Daniele Delong, I believe the best course of action is to remain conservative from vascular surgery standpoint.  Currently he is doing well and denies any strokelike symptoms.  I did review his testing which shows less than 50% carotid stenosis bilaterally.  We will see him back in 6 months with repeat noninvasive testing for continued surveillance, including a carotid duplex.  I did discuss vascular risk factors as they pertain to the progression of vascular disease including controlling his hypertension and hyperlipidemia.  His blood pressure stable on his current medications.  He should continue his aspirin 81 mg daily, Plavix 75 mg daily and Lipitor 20 mg daily in addition to his other medications.  Body mass index is 29.09 kg/m². BMI is >= 25 and <30. (Overweight) The following options were  offered after discussion;: nutrition counseling/recommendations Am pleased to report he is doing well status post left TCAR.  His left neck incision has healed as well as his right groin.  He is free to resume normal activity without restriction.  We will see him back in 6 months with repeat noninvasive testing for continued surveillance, including a carotid duplex.  I did discuss vascular risk factors as it pertains to the progression of vascular disease including controlling his hypertension and hyperlipidemia.  His blood pressure stable on his current medications.  He should continue his aspirin 81 mg daily, Plavix 75 mg daily, and Lipitor 20 mg daily in addition to his other medications.  This was all discussed in full with complete understanding.  Thank you for allowing me to participate in the care of your patient.  Please do not hesitate to call with any questions or concerns.  We will keep you aware of any further encounters with Daniele Delong.        Sincerely yours,         KWABENA Larios Lindsey, APRN

## 2025-01-14 DIAGNOSIS — I71.20 THORACIC AORTIC ANEURYSM WITHOUT RUPTURE, UNSPECIFIED PART: Primary | ICD-10-CM

## 2025-02-12 ENCOUNTER — OFFICE VISIT (OUTPATIENT)
Dept: CARDIAC SURGERY | Facility: CLINIC | Age: 75
End: 2025-02-12
Payer: MEDICARE

## 2025-02-12 ENCOUNTER — HOSPITAL ENCOUNTER (OUTPATIENT)
Dept: CT IMAGING | Facility: HOSPITAL | Age: 75
Discharge: HOME | End: 2025-02-12
Admitting: NURSE PRACTITIONER
Payer: MEDICARE

## 2025-02-12 VITALS
BODY MASS INDEX: 30.37 KG/M2 | OXYGEN SATURATION: 95 % | DIASTOLIC BLOOD PRESSURE: 68 MMHG | HEART RATE: 71 BPM | WEIGHT: 189 LBS | SYSTOLIC BLOOD PRESSURE: 118 MMHG | HEIGHT: 66 IN

## 2025-02-12 DIAGNOSIS — I71.21 ANEURYSM OF ASCENDING AORTA WITHOUT RUPTURE: Primary | ICD-10-CM

## 2025-02-12 DIAGNOSIS — I71.20 THORACIC AORTIC ANEURYSM WITHOUT RUPTURE, UNSPECIFIED PART: ICD-10-CM

## 2025-02-12 LAB — CREAT BLDA-MCNC: 1.1 MG/DL (ref 0.6–1.3)

## 2025-02-12 PROCEDURE — 1159F MED LIST DOCD IN RCRD: CPT | Performed by: THORACIC SURGERY (CARDIOTHORACIC VASCULAR SURGERY)

## 2025-02-12 PROCEDURE — 1160F RVW MEDS BY RX/DR IN RCRD: CPT | Performed by: THORACIC SURGERY (CARDIOTHORACIC VASCULAR SURGERY)

## 2025-02-12 PROCEDURE — 3074F SYST BP LT 130 MM HG: CPT | Performed by: THORACIC SURGERY (CARDIOTHORACIC VASCULAR SURGERY)

## 2025-02-12 PROCEDURE — 99213 OFFICE O/P EST LOW 20 MIN: CPT | Performed by: THORACIC SURGERY (CARDIOTHORACIC VASCULAR SURGERY)

## 2025-02-12 PROCEDURE — 25510000001 IOPAMIDOL PER 1 ML: Performed by: NURSE PRACTITIONER

## 2025-02-12 PROCEDURE — 71275 CT ANGIOGRAPHY CHEST: CPT

## 2025-02-12 PROCEDURE — 3078F DIAST BP <80 MM HG: CPT | Performed by: THORACIC SURGERY (CARDIOTHORACIC VASCULAR SURGERY)

## 2025-02-12 PROCEDURE — 82565 ASSAY OF CREATININE: CPT

## 2025-02-12 RX ORDER — IOPAMIDOL 755 MG/ML
100 INJECTION, SOLUTION INTRAVASCULAR
Status: COMPLETED | OUTPATIENT
Start: 2025-02-12 | End: 2025-02-12

## 2025-02-12 RX ADMIN — IOPAMIDOL 100 ML: 755 INJECTION, SOLUTION INTRAVENOUS at 09:16

## 2025-02-17 NOTE — PROGRESS NOTES
"Subjective   Patient ID: Daniele LUCAS Baljeet is a 75 y.o. male who is here for follow-up of a known aneurysm.   Chief Complaint   Patient presents with    Thoracic Aneurysm     Pt is here for follow up w/ CT     History of Present Illness  The patient is a 75-year-old male who presents for follow-up of ascending aortic aneurysm.    He reports no chest discomfort and has been adhering to his weight restrictions, refraining from heavy lifting. He has been managing his blood pressure effectively under the care of Dr. Terri Rodriguez.    SOCIAL HISTORY  He is a non-smoker.    FAMILY HISTORY  His father had 16 stents placed in his heart.    The following portions of the patient's history were reviewed and updated as appropriate: allergies, current medications, past family history, past medical history, past social history, past surgical history and problem list.    Visit Vitals  /68   Pulse 71   Ht 167.6 cm (66\")   Wt 85.7 kg (189 lb)   SpO2 95%   BMI 30.51 kg/m²        Objective   Physical Exam  Physical Exam  No acute distress, appropriate.  Lungs are clear to auscultation bilaterally. No wheezing, rubs, or rales.  Heart has a regular rate and rhythm without murmurs, rubs, or gallops.  Abdomen is soft, nondistended, nontender.  There is no clubbing, cyanosis, or edema in the extremities.    Vital Signs  Blood pressure is normal.      CT Angiogram Chest    Result Date: 2/12/2025  Narrative: EXAM/TECHNIQUE: CT chest angiography with 3D MIP images with IV contrast  INDICATION: Thoracic aortic aneurysm; I71.20-Thoracic aortic aneurysm, without rupture, unspecified  COMPARISON: 2/7/2024  DLP: 354.95 mGy.cm. Automated exposure control was utilized to decrease patient radiation dose.  FINDINGS:  Aortic root is nondilated. No change in dilatation of the ascending aorta measuring 4.2 cm diameter. Aortic arch and descending thoracic aorta are nondilated and contain heavy atherosclerotic plaque and calcification. Three-vessel " aortic arch with widely patent branch origins.  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 Mild atherosclerotic stenosis of the SMA origin. No central pulmonary artery filling defect. Heavy coronary artery calcification. No pericardial effusion.  Central airways are clear. No consolidation or pleural effusion. No advanced emphysematous or fibrotic change. No suspicious pulmonary nodule. No enlarged thoracic lymph nodes.  No acute chest soft tissue abnormality. No acute finding included upper abdomen. Exophytic left upper pole renal cyst. No acute osseous finding.      Impression:  No change in dilatation of the ascending aorta measuring 4.2 cm diameter. Heavy atherosclerotic plaque in the aortic arch and descending thoracic aorta. Heavy coronary artery atherosclerotic calcification. 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   This report was signed and finalized on 2/12/2025 10:10 AM by Dr. Kilo Stroud MD.     Results  Imaging  Distal ascending artery measures 4 cm, measures in greatest dimension 4.3. Aortic root measures 3.6. On coronal, the root measures 3.3 previously. The aortic root measured 4.2 cm in size and prior to that 4.1.    Diagnoses and all orders for this visit:    1. Aneurysm of ascending aorta without rupture (Primary)  -     CT angiogram chest w contrast; Future          Assessment & Plan     Assessment & Plan  1. Ascending aortic aneurysm.  The patient's aortic aneurysm has demonstrated a growth of 1 mm, which aligns with the expected rate of progression. His blood pressure is well-regulated, and he is adhering to the prescribed aortic restrictions. A conservative approach will be maintained from an aneurysm management perspective, with continued vigilance. The potential signs and symptoms of acute aortic syndromes were discussed during this visit. He is scheduled for a follow-up visit in 1 year, which will include a CTA chest  examination.    Follow-up  The patient will follow up in 1 year.     He is a non smoker.    Many thanks for the opportunity to care for your patient.    I will continue to keep you apprised of provided care as it ensues.            Patient or patient representative verbalized consent for the use of Ambient Listening during the visit with  Sy Mcginnis MD for chart documentation. 2/16/2025  20:19 CST

## 2025-05-23 ENCOUNTER — OFFICE VISIT (OUTPATIENT)
Dept: VASCULAR SURGERY | Facility: CLINIC | Age: 75
End: 2025-05-23
Payer: MEDICARE

## 2025-05-23 ENCOUNTER — HOSPITAL ENCOUNTER (OUTPATIENT)
Dept: ULTRASOUND IMAGING | Facility: HOSPITAL | Age: 75
Discharge: HOME | End: 2025-05-23
Payer: MEDICARE

## 2025-05-23 VITALS
OXYGEN SATURATION: 97 % | HEIGHT: 66 IN | WEIGHT: 187 LBS | BODY MASS INDEX: 30.05 KG/M2 | SYSTOLIC BLOOD PRESSURE: 120 MMHG | HEART RATE: 71 BPM | DIASTOLIC BLOOD PRESSURE: 70 MMHG

## 2025-05-23 DIAGNOSIS — I71.21 ANEURYSM OF ASCENDING AORTA WITHOUT RUPTURE: ICD-10-CM

## 2025-05-23 DIAGNOSIS — E78.49 OTHER HYPERLIPIDEMIA: ICD-10-CM

## 2025-05-23 DIAGNOSIS — I65.23 BILATERAL CAROTID ARTERY STENOSIS: ICD-10-CM

## 2025-05-23 DIAGNOSIS — I65.23 BILATERAL CAROTID ARTERY STENOSIS: Primary | ICD-10-CM

## 2025-05-23 DIAGNOSIS — I10 ESSENTIAL HYPERTENSION: ICD-10-CM

## 2025-05-23 PROCEDURE — 1159F MED LIST DOCD IN RCRD: CPT | Performed by: NURSE PRACTITIONER

## 2025-05-23 PROCEDURE — 3078F DIAST BP <80 MM HG: CPT | Performed by: NURSE PRACTITIONER

## 2025-05-23 PROCEDURE — 3074F SYST BP LT 130 MM HG: CPT | Performed by: NURSE PRACTITIONER

## 2025-05-23 PROCEDURE — 1160F RVW MEDS BY RX/DR IN RCRD: CPT | Performed by: NURSE PRACTITIONER

## 2025-05-23 PROCEDURE — 93880 EXTRACRANIAL BILAT STUDY: CPT

## 2025-05-23 PROCEDURE — 99214 OFFICE O/P EST MOD 30 MIN: CPT | Performed by: NURSE PRACTITIONER

## 2025-05-23 NOTE — LETTER
June 2, 2025     KWABENA Gunderson  [address] [place] [address]    Patient: Daniele Delong   YOB: 1950   Date of Visit: 5/23/2025     Dear KWABENA Gunderson:       Thank you for referring Daniele Delong to me for evaluation. Below are the relevant portions of my assessment and plan of care.    If you have questions, please do not hesitate to call me. I look forward to following Daniele along with you.         Sincerely,        KWABENA Larios        CC: No Recipients    Maria Luz Chatman APRN  06/02/25 1652  Sign when Signing Visit  05/23/2025       Terri Rodriguez APRN  [address] [place] [address]    Daniele Delong  1950    Chief Complaint   Patient presents with   • Bilateral carotid artery stenosis     No complaints, had testing completed        Dear Terri Rodriguez APRN   HPI  I had the pleasure of seeing your patient Daniele Delong in the office today.    As you recall, Daniele Delong is a 75 y.o.  male who you are currently following for routine health maintenance.  He does have a history of stroke with dizziness and blurred vision.  He was a previous smoker but quit in 2019.  He is followed by Dr. Mcginnis for an ascending aortic aneurysm, last seen 2/7/24.  He has previously undergone a left carotid endarterectomy many years ago and was found to have significant restenosis.  He did undergo a left TCAR on 5/9/2024.  Currently he is doing well and denies any strokelike symptoms.  He is maintained on aspirin, Plavix, and Lipitor.  He did have noninvasive testing performed today, which I did review in office.      Review of Systems   Constitutional: Negative.    HENT: Negative.     Eyes: Negative.    Respiratory: Negative.     Cardiovascular: Negative.    Gastrointestinal: Negative.    Endocrine: Negative.    Genitourinary: Negative.    Musculoskeletal: Negative.    Skin: Negative.    Allergic/Immunologic: Negative.    Neurological:  Positive for numbness (To his feet  "at times).   Hematological: Negative.    Psychiatric/Behavioral: Negative.     All other systems reviewed and are negative.       /70   Pulse 71   Ht 167.6 cm (66\")   Wt 84.8 kg (187 lb)   SpO2 97%   BMI 30.18 kg/m²    Physical Exam  Vitals and nursing note reviewed.   Constitutional:       Appearance: Normal appearance. He is well-developed and overweight.   HENT:      Head: Normocephalic and atraumatic.   Eyes:      General: No scleral icterus.     Pupils: Pupils are equal, round, and reactive to light.   Neck:      Thyroid: No thyromegaly.   Cardiovascular:      Rate and Rhythm: Normal rate and regular rhythm.      Heart sounds: Normal heart sounds.   Pulmonary:      Effort: Pulmonary effort is normal.      Breath sounds: Normal breath sounds.   Abdominal:      General: Bowel sounds are normal.      Palpations: Abdomen is soft.   Musculoskeletal:         General: Normal range of motion.      Cervical back: Normal range of motion and neck supple.   Skin:     General: Skin is warm and dry.   Neurological:      Mental Status: He is alert and oriented to person, place, and time.   Psychiatric:         Behavior: Behavior normal. Behavior is cooperative.         Thought Content: Thought content normal.         Judgment: Judgment normal.       Diagnostic data:  US Carotid Bilateral  Result Date: 5/23/2025  Narrative: History: Carotid occlusive disease      Impression: Impression: 1. There is less than 50% stenosis of the right internal carotid artery. 2. There is less than 50% stenosis of the left internal carotid artery. 3. Antegrade flow is demonstrated in bilateral vertebral arteries.  Comments: Bilateral carotid vertebral arterial duplex scan was performed.  Grayscale imaging shows intimal thickening and calcified elements at the carotid bifurcation. The right internal carotid artery peak systolic velocity is 62.7 cm/sec. The end-diastolic velocity is 20.2 cm/sec. The right ICA/CCA ratio is approximately " 1.1. These findings correlate with less than 50% stenosis of the right internal carotid artery.  Grayscale imaging shows intimal thickening and calcified elements at the carotid bifurcation. The left internal carotid artery peak systolic velocity is 60.1 cm/sec. The end-diastolic velocity is 22.2 cm/sec. The left ICA/CCA ratio is approximately 0.9. These findings correlate with less than 50% stenosis of the left internal carotid artery.  Antegrade flow is demonstrated in bilateral vertebral arteries. There is greater than 50% stenosis of the left external carotid artery  This report was signed and finalized on 5/23/2025 6:02 PM by Nikunj Naqvi.           Patient Active Problem List   Diagnosis   • Paresthesias   • Essential hypertension   • Oropharyngeal dysphagia   • Other hyperlipidemia   • Thoracic aortic aneurysm without rupture   • Carotid stenosis, left   • Stenosis of left carotid artery   • Preop testing        Diagnosis Plan   1. Bilateral carotid artery stenosis  US Carotid Bilateral      2. Essential hypertension        3. Other hyperlipidemia        4. Aneurysm of ascending aorta without rupture              Plan: After thoroughly evaluating Daniele Delong, I believe the best course of action is to remain conservative from vascular surgery standpoint.  Currently he is doing well and denies any strokelike symptoms.  I did review his testing which shows less than 50% carotid stenosis bilaterally with patent left carotid stent.  We will see him back in 6 months with repeat noninvasive testing for continued surveillance, including a carotid duplex.  I did discuss vascular risk factors as it pertains to the progression of vascular disease including controlling his hypertension and hyperlipidemia.  His blood pressure is stable on his current medications.  He should continue his aspirin 81 mg daily, Plavix 75 mg daily, and Lipitor 20 mg daily in addition to his other medications. Body mass index is 30.18  kg/m².  This was all discussed in full with complete understanding.  Thank you for allowing me to participate in the care of your patient.  Please do not hesitate to call with any questions or concerns.  We will keep you aware of any further encounters with Daniele Delong.        Sincerely yours,         KWABENA Larios Lindsey, APRN     No

## 2025-05-23 NOTE — PROGRESS NOTES
"05/23/2025       Terri Rodriguez APRN  [address] [facility] [address]    Daniele Delong  1950    Chief Complaint   Patient presents with    Bilateral carotid artery stenosis     No complaints, had testing completed        Dear Terri Rodriguez APRN   HPI  I had the pleasure of seeing your patient Daniele Delong in the office today.    As you recall, Daniele Delong is a 75 y.o.  male who you are currently following for routine health maintenance.  He does have a history of stroke with dizziness and blurred vision.  He was a previous smoker but quit in 2019.  He is followed by Dr. Mcginnis for an ascending aortic aneurysm, last seen 2/7/24.  He has previously undergone a left carotid endarterectomy many years ago and was found to have significant restenosis.  He did undergo a left TCAR on 5/9/2024.  Currently he is doing well and denies any strokelike symptoms.  He is maintained on aspirin, Plavix, and Lipitor.  He did have noninvasive testing performed today, which I did review in office.      Review of Systems   Constitutional: Negative.    HENT: Negative.     Eyes: Negative.    Respiratory: Negative.     Cardiovascular: Negative.    Gastrointestinal: Negative.    Endocrine: Negative.    Genitourinary: Negative.    Musculoskeletal: Negative.    Skin: Negative.    Allergic/Immunologic: Negative.    Neurological:  Positive for numbness (To his feet at times).   Hematological: Negative.    Psychiatric/Behavioral: Negative.     All other systems reviewed and are negative.       /70   Pulse 71   Ht 167.6 cm (66\")   Wt 84.8 kg (187 lb)   SpO2 97%   BMI 30.18 kg/m²    Physical Exam  Vitals and nursing note reviewed.   Constitutional:       Appearance: Normal appearance. He is well-developed and overweight.   HENT:      Head: Normocephalic and atraumatic.   Eyes:      General: No scleral icterus.     Pupils: Pupils are equal, round, and reactive to light.   Neck:      Thyroid: No thyromegaly. "   Cardiovascular:      Rate and Rhythm: Normal rate and regular rhythm.      Heart sounds: Normal heart sounds.   Pulmonary:      Effort: Pulmonary effort is normal.      Breath sounds: Normal breath sounds.   Abdominal:      General: Bowel sounds are normal.      Palpations: Abdomen is soft.   Musculoskeletal:         General: Normal range of motion.      Cervical back: Normal range of motion and neck supple.   Skin:     General: Skin is warm and dry.   Neurological:      Mental Status: He is alert and oriented to person, place, and time.   Psychiatric:         Behavior: Behavior normal. Behavior is cooperative.         Thought Content: Thought content normal.         Judgment: Judgment normal.       Diagnostic data:  US Carotid Bilateral  Result Date: 5/23/2025  Narrative: History: Carotid occlusive disease      Impression: Impression: 1. There is less than 50% stenosis of the right internal carotid artery. 2. There is less than 50% stenosis of the left internal carotid artery. 3. Antegrade flow is demonstrated in bilateral vertebral arteries.  Comments: Bilateral carotid vertebral arterial duplex scan was performed.  Grayscale imaging shows intimal thickening and calcified elements at the carotid bifurcation. The right internal carotid artery peak systolic velocity is 62.7 cm/sec. The end-diastolic velocity is 20.2 cm/sec. The right ICA/CCA ratio is approximately 1.1. These findings correlate with less than 50% stenosis of the right internal carotid artery.  Grayscale imaging shows intimal thickening and calcified elements at the carotid bifurcation. The left internal carotid artery peak systolic velocity is 60.1 cm/sec. The end-diastolic velocity is 22.2 cm/sec. The left ICA/CCA ratio is approximately 0.9. These findings correlate with less than 50% stenosis of the left internal carotid artery.  Antegrade flow is demonstrated in bilateral vertebral arteries. There is greater than 50% stenosis of the left  external carotid artery  This report was signed and finalized on 5/23/2025 6:02 PM by Nikunj Naqvi.           Patient Active Problem List   Diagnosis    Paresthesias    Essential hypertension    Oropharyngeal dysphagia    Other hyperlipidemia    Thoracic aortic aneurysm without rupture    Carotid stenosis, left    Stenosis of left carotid artery    Preop testing        Diagnosis Plan   1. Bilateral carotid artery stenosis  US Carotid Bilateral      2. Essential hypertension        3. Other hyperlipidemia        4. Aneurysm of ascending aorta without rupture              Plan: After thoroughly evaluating Daniele Delong, I believe the best course of action is to remain conservative from vascular surgery standpoint.  Currently he is doing well and denies any strokelike symptoms.  I did review his testing which shows less than 50% carotid stenosis bilaterally with patent left carotid stent.  We will see him back in 6 months with repeat noninvasive testing for continued surveillance, including a carotid duplex.  I did discuss vascular risk factors as it pertains to the progression of vascular disease including controlling his hypertension and hyperlipidemia.  His blood pressure is stable on his current medications.  He should continue his aspirin 81 mg daily, Plavix 75 mg daily, and Lipitor 20 mg daily in addition to his other medications. Body mass index is 30.18 kg/m².  This was all discussed in full with complete understanding.  Thank you for allowing me to participate in the care of your patient.  Please do not hesitate to call with any questions or concerns.  We will keep you aware of any further encounters with Daniele Delong.        Sincerely yours,         KWABENA Larios Lindsey, APRN

## 2025-07-25 ENCOUNTER — TELEPHONE (OUTPATIENT)
Dept: VASCULAR SURGERY | Facility: CLINIC | Age: 75
End: 2025-07-25
Payer: MEDICARE

## 2025-07-25 NOTE — TELEPHONE ENCOUNTER
SPOKE WITH PATIENT ABOUT SCHEDULING HIS TESTING, PATIENT SAID HE WAS IN [place] AND WOULD WAIT TO SCHEDULE. I GAVE HIM THE NUMBER TO CALL WHEN HE WAS READY TO SCHEDULE, BUT IT HAD TO BE DONE BEFORE HIS FOLLOW UP